# Patient Record
Sex: FEMALE | Race: WHITE | Employment: PART TIME | URBAN - NONMETROPOLITAN AREA
[De-identification: names, ages, dates, MRNs, and addresses within clinical notes are randomized per-mention and may not be internally consistent; named-entity substitution may affect disease eponyms.]

---

## 2017-07-06 ENCOUNTER — TELEPHONE (OUTPATIENT)
Dept: FAMILY MEDICINE CLINIC | Facility: CLINIC | Age: 57
End: 2017-07-06

## 2017-07-06 ENCOUNTER — OFFICE VISIT (OUTPATIENT)
Dept: FAMILY MEDICINE CLINIC | Facility: CLINIC | Age: 57
End: 2017-07-06

## 2017-07-06 VITALS
WEIGHT: 187 LBS | HEART RATE: 88 BPM | RESPIRATION RATE: 18 BRPM | SYSTOLIC BLOOD PRESSURE: 102 MMHG | BODY MASS INDEX: 32.72 KG/M2 | TEMPERATURE: 98 F | HEIGHT: 63.19 IN | DIASTOLIC BLOOD PRESSURE: 78 MMHG

## 2017-07-06 DIAGNOSIS — F33.1 MODERATE RECURRENT MAJOR DEPRESSION (HCC): ICD-10-CM

## 2017-07-06 DIAGNOSIS — Z11.59 NEED FOR HEPATITIS C SCREENING TEST: ICD-10-CM

## 2017-07-06 DIAGNOSIS — Z13.6 SCREENING FOR CARDIOVASCULAR CONDITION: ICD-10-CM

## 2017-07-06 DIAGNOSIS — F51.04 PSYCHOPHYSIOLOGICAL INSOMNIA: ICD-10-CM

## 2017-07-06 DIAGNOSIS — Z12.31 VISIT FOR SCREENING MAMMOGRAM: ICD-10-CM

## 2017-07-06 DIAGNOSIS — C21.0 ANAL CANCER (HCC): ICD-10-CM

## 2017-07-06 DIAGNOSIS — Z00.00 GENERAL MEDICAL EXAM: ICD-10-CM

## 2017-07-06 DIAGNOSIS — Z00.00 ENCOUNTER FOR ANNUAL HEALTH EXAMINATION: Primary | ICD-10-CM

## 2017-07-06 DIAGNOSIS — Z78.0 POSTMENOPAUSAL: ICD-10-CM

## 2017-07-06 DIAGNOSIS — F31.32 BIPOLAR AFFECTIVE DISORDER, CURRENTLY DEPRESSED, MODERATE (HCC): ICD-10-CM

## 2017-07-06 PROCEDURE — 96160 PT-FOCUSED HLTH RISK ASSMT: CPT | Performed by: FAMILY MEDICINE

## 2017-07-06 RX ORDER — TRAMADOL HYDROCHLORIDE 50 MG/1
50 TABLET ORAL EVERY 8 HOURS PRN
COMMUNITY
Start: 2016-10-18 | End: 2019-05-20

## 2017-07-06 RX ORDER — ESCITALOPRAM OXALATE 20 MG/1
20 TABLET ORAL DAILY
COMMUNITY
End: 2017-07-06

## 2017-07-06 RX ORDER — TRAZODONE HYDROCHLORIDE 50 MG/1
50 TABLET ORAL NIGHTLY
COMMUNITY
Start: 2017-03-06 | End: 2017-09-25

## 2017-07-06 RX ORDER — CARBIDOPA/LEVODOPA 25MG-250MG
1 TABLET ORAL 2 TIMES DAILY
COMMUNITY
End: 2017-09-25

## 2017-07-06 RX ORDER — MELATONIN
3 NIGHTLY
COMMUNITY
End: 2017-09-29

## 2017-07-06 RX ORDER — LORATADINE 10 MG/1
10 TABLET ORAL DAILY
COMMUNITY
End: 2017-09-29

## 2017-07-06 RX ORDER — ESTRADIOL 0.1 MG/G
0.01 CREAM VAGINAL
COMMUNITY
Start: 2016-10-17 | End: 2017-08-09

## 2017-07-06 RX ORDER — BUPROPION HYDROCHLORIDE 150 MG/1
150 TABLET, EXTENDED RELEASE ORAL DAILY
COMMUNITY
Start: 2017-03-06 | End: 2017-08-11

## 2017-07-06 NOTE — TELEPHONE ENCOUNTER
Patient needs to follow through with Dr. Padmini Monet and [facility]. V/o Dr. Janis Jones.

## 2017-07-06 NOTE — TELEPHONE ENCOUNTER
Patient in lobby. Has power port. Gets flushed every 3 months. End of flush is with Heparin. Wants to know if this can be done here. Advised need physician's order. Patient verbalizes understanding.

## 2017-07-06 NOTE — PATIENT INSTRUCTIONS
Monica Holguin's SCREENING SCHEDULE   Tests on this list are recommended by your physician but may not be covered, or covered at this frequency, by your insurer. Please check with your insurance carrier before scheduling to verify coverage.    PREVENTATI results found for this or any previous visit. No flowsheet data found. Fecal Occult Blood   Covered Annually No results found for: FOB, OCCULTSTOOL No flowsheet data found.      Barium Enema-   uncomfortable but covered  Covered but uncomfortable   Glau Medium/high risk factors:   End-stage renal disease   Hemophiliacs who received Factor VIII or IX concentrates   Clients of institutions for the mentally retarded   Persons who live in the same house as a HepB virus carrier   Homosexual men   Illicit injec

## 2017-07-06 NOTE — PROGRESS NOTES
HPI:   Devaughn Bruno is a 62year old female who presents for a MA (Medicare Advantage) Supervisit (Once per calendar year). Has complicated anal cancer history.      Her last annual assessment has been over 1 year: Annual Physical due on 01/06/1962 exertion  CARDIOVASCULAR: denies chest pain on exertion  GI: denies abdominal pain, denies heartburn  : denies dysuria, vaginal discharge or itching, no complaint of urinary incontinence   MUSCULOSKELETAL: denies back pain  NEURO: denies headaches  PSYCH COMP METABOLIC PANEL (14); Future  -     TSH W REFLEX TO FREE T4; Future  -     CBC WITH DIFFERENTIAL WITH PLATELET;  Future  -     OP REFERRAL TO GENERAL SURGERY    General medical exam    Anal cancer ([facility] [address]  -     OP REFERRAL TO [address] [place], [place] [address] HEMATOLOGY/ Yes (new patient)     Functional Ability     Bathing or Showering: Need some help    Toileting: Able without help    Dressing: Need some help    Eating: Able without help    Driving: Cannot do without help    Preparing your meals: Able without help    [facility] have been moving around a lot more than usual: Nearly every day  Thoughts that you would be better off dead, or of hurting yourself in some way: More than half the days  PHQ-9 TOTAL SCORE: 21  If you checked off any problems, how difficult have these probl No results found for this or any previous visit. No flowsheet data found.     Pap and Pelvic      Pap: Every 3 yrs age 21-65 or Pap+HPV every 5 yrs age 33-67, age 72 and older at high risk Pap Smear,3 Years due on 01/06/1991 Update Ezoic if boyd data found. Drug Serum Conc  Annually No results found for: DIGOXIN, DIG, VALP No flowsheet data found. Diabetes      HgbA1C  Annually No results found for: A1C No flowsheet data found.     Creat/alb ratio  Annually      LDL  Annually No results foun

## 2017-07-07 ENCOUNTER — HOSPITAL ENCOUNTER (OUTPATIENT)
Dept: MAMMOGRAPHY | Age: 57
Discharge: HOME | End: 2017-07-07
Attending: FAMILY MEDICINE
Payer: MEDICARE

## 2017-07-07 DIAGNOSIS — Z00.00 ENCOUNTER FOR ANNUAL HEALTH EXAMINATION: ICD-10-CM

## 2017-07-07 DIAGNOSIS — Z12.31 VISIT FOR SCREENING MAMMOGRAM: ICD-10-CM

## 2017-07-07 PROCEDURE — 77067 SCR MAMMO BI INCL CAD: CPT | Performed by: FAMILY MEDICINE

## 2017-07-07 NOTE — PROGRESS NOTES
Moderate recurrent major depression ([facility] [address]  Stable, but due to polypharmacy she is referred to [facility] coordinator Magdalena Neumann for connection with psychiatrist for further management.    - OP REFERRAL TO [facility]     Bipolar affective disorder, currently depr

## 2017-07-21 ENCOUNTER — HOSPITAL ENCOUNTER (OUTPATIENT)
Dept: BONE DENSITY | Age: 57
Discharge: HOME | End: 2017-07-21
Attending: FAMILY MEDICINE
Payer: MEDICARE

## 2017-07-21 DIAGNOSIS — Z78.0 POSTMENOPAUSAL: ICD-10-CM

## 2017-07-21 DIAGNOSIS — Z00.00 ENCOUNTER FOR ANNUAL HEALTH EXAMINATION: ICD-10-CM

## 2017-07-21 PROCEDURE — 77080 DXA BONE DENSITY AXIAL: CPT | Performed by: FAMILY MEDICINE

## 2017-08-03 ENCOUNTER — APPOINTMENT (OUTPATIENT)
Dept: HEMATOLOGY/ONCOLOGY | Age: 57
End: 2017-08-03
Attending: INTERNAL MEDICINE
Payer: MEDICARE

## 2017-08-08 ENCOUNTER — TELEPHONE (OUTPATIENT)
Dept: FAMILY MEDICINE CLINIC | Facility: CLINIC | Age: 57
End: 2017-08-08

## 2017-08-08 ENCOUNTER — TELEPHONE (OUTPATIENT)
Dept: SURGERY | Facility: CLINIC | Age: 57
End: 2017-08-08

## 2017-08-08 ENCOUNTER — HOSPITAL ENCOUNTER (OUTPATIENT)
Dept: INTERVENTIONAL RADIOLOGY/VASCULAR | Facility: HOSPITAL | Age: 57
Discharge: HOME | End: 2017-08-08
Attending: SURGERY | Admitting: SURGERY
Payer: MEDICARE

## 2017-08-08 ENCOUNTER — OFFICE VISIT (OUTPATIENT)
Dept: FAMILY MEDICINE CLINIC | Facility: CLINIC | Age: 57
End: 2017-08-08

## 2017-08-08 VITALS
RESPIRATION RATE: 16 BRPM | HEART RATE: 74 BPM | SYSTOLIC BLOOD PRESSURE: 166 MMHG | OXYGEN SATURATION: 99 % | DIASTOLIC BLOOD PRESSURE: 94 MMHG

## 2017-08-08 VITALS
SYSTOLIC BLOOD PRESSURE: 130 MMHG | HEIGHT: 63 IN | DIASTOLIC BLOOD PRESSURE: 82 MMHG | WEIGHT: 188.25 LBS | OXYGEN SATURATION: 81 % | TEMPERATURE: 98 F | BODY MASS INDEX: 33.36 KG/M2 | HEART RATE: 99 BPM

## 2017-08-08 DIAGNOSIS — IMO0001 OTHER COMPLICATION DUE TO VENOUS ACCESS DEVICE, INITIAL ENCOUNTER: ICD-10-CM

## 2017-08-08 DIAGNOSIS — C21.0 ANAL CANCER (HCC): Primary | ICD-10-CM

## 2017-08-08 DIAGNOSIS — IMO0001 OTHER COMPLICATION DUE TO VENOUS ACCESS DEVICE, INITIAL ENCOUNTER: Primary | ICD-10-CM

## 2017-08-08 DIAGNOSIS — Z13.6 SCREENING FOR CARDIOVASCULAR CONDITION: ICD-10-CM

## 2017-08-08 DIAGNOSIS — Z00.00 ENCOUNTER FOR ANNUAL HEALTH EXAMINATION: ICD-10-CM

## 2017-08-08 DIAGNOSIS — Z11.59 NEED FOR HEPATITIS C SCREENING TEST: ICD-10-CM

## 2017-08-08 DIAGNOSIS — C21.0 SQUAMOUS CELL CANCER, ANUS (HCC): Primary | ICD-10-CM

## 2017-08-08 LAB
ALBUMIN SERPL-MCNC: 4.3 G/DL (ref 3.5–4.8)
ALP LIVER SERPL-CCNC: 76 U/L (ref 46–118)
ALT SERPL-CCNC: 30 U/L (ref 14–54)
AST SERPL-CCNC: 17 U/L (ref 15–41)
BASOPHILS # BLD AUTO: 0.02 X10(3) UL (ref 0–0.1)
BASOPHILS NFR BLD AUTO: 0.5 %
BILIRUB SERPL-MCNC: 0.6 MG/DL (ref 0.1–2)
BUN BLD-MCNC: 10 MG/DL (ref 8–20)
CALCIUM BLD-MCNC: 10.2 MG/DL (ref 8.3–10.3)
CHLORIDE: 106 MMOL/L (ref 101–111)
CHOLEST SMN-MCNC: 276 MG/DL (ref ?–200)
CO2: 28 MMOL/L (ref 22–32)
CREAT BLD-MCNC: 0.82 MG/DL (ref 0.55–1.02)
EOSINOPHIL # BLD AUTO: 0.09 X10(3) UL (ref 0–0.3)
EOSINOPHIL NFR BLD AUTO: 2.4 %
ERYTHROCYTE [DISTWIDTH] IN BLOOD BY AUTOMATED COUNT: 13.1 % (ref 11.5–16)
FREE T4: 0.8 NG/DL (ref 0.9–1.8)
GLUCOSE BLD-MCNC: 83 MG/DL (ref 70–99)
HCT VFR BLD AUTO: 39.6 % (ref 34–50)
HDLC SERPL-MCNC: 46 MG/DL (ref 45–?)
HDLC SERPL: 6 {RATIO} (ref ?–4.44)
HEPATITIS C VIRUS AB INTERPRETATION: NONREACTIVE
HGB BLD-MCNC: 13.3 G/DL (ref 12–16)
IMMATURE GRANULOCYTE COUNT: 0.01 X10(3) UL (ref 0–1)
IMMATURE GRANULOCYTE RATIO %: 0.3 %
LDLC SERPL CALC-MCNC: 171 MG/DL (ref ?–130)
LDLC SERPL-MCNC: 59 MG/DL (ref 5–40)
LYMPHOCYTES # BLD AUTO: 1.08 X10(3) UL (ref 0.9–4)
LYMPHOCYTES NFR BLD AUTO: 28.3 %
M PROTEIN MFR SERPL ELPH: 7.8 G/DL (ref 6.1–8.3)
MCH RBC QN AUTO: 29.4 PG (ref 27–33.2)
MCHC RBC AUTO-ENTMCNC: 33.6 G/DL (ref 31–37)
MCV RBC AUTO: 87.4 FL (ref 81–100)
MONOCYTES # BLD AUTO: 0.3 X10(3) UL (ref 0.1–0.6)
MONOCYTES NFR BLD AUTO: 7.9 %
NEUTROPHIL ABS PRELIM: 2.31 X10 (3) UL (ref 1.3–6.7)
NEUTROPHILS # BLD AUTO: 2.31 X10(3) UL (ref 1.3–6.7)
NEUTROPHILS NFR BLD AUTO: 60.6 %
NONHDLC SERPL-MCNC: 230 MG/DL (ref ?–130)
PLATELET # BLD AUTO: 222 10(3)UL (ref 150–450)
POTASSIUM SERPL-SCNC: 3.6 MMOL/L (ref 3.6–5.1)
RBC # BLD AUTO: 4.53 X10(6)UL (ref 3.8–5.1)
RED CELL DISTRIBUTION WIDTH-SD: 41.1 FL (ref 35.1–46.3)
SODIUM SERPL-SCNC: 142 MMOL/L (ref 136–144)
TRIGLYCERIDES: 294 MG/DL (ref ?–150)
TSI SER-ACNC: 7.88 MIU/ML (ref 0.35–5.5)
WBC # BLD AUTO: 3.8 X10(3) UL (ref 4–13)

## 2017-08-08 PROCEDURE — 80061 LIPID PANEL: CPT | Performed by: FAMILY MEDICINE

## 2017-08-08 PROCEDURE — 85025 COMPLETE CBC W/AUTO DIFF WBC: CPT | Performed by: FAMILY MEDICINE

## 2017-08-08 PROCEDURE — 84439 ASSAY OF FREE THYROXINE: CPT | Performed by: FAMILY MEDICINE

## 2017-08-08 PROCEDURE — 36598 INJ W/FLUOR EVAL CV DEVICE: CPT

## 2017-08-08 PROCEDURE — 80053 COMPREHEN METABOLIC PANEL: CPT | Performed by: FAMILY MEDICINE

## 2017-08-08 PROCEDURE — 84443 ASSAY THYROID STIM HORMONE: CPT | Performed by: FAMILY MEDICINE

## 2017-08-08 PROCEDURE — 86803 HEPATITIS C AB TEST: CPT | Performed by: FAMILY MEDICINE

## 2017-08-08 PROCEDURE — 99214 OFFICE O/P EST MOD 30 MIN: CPT | Performed by: SURGERY

## 2017-08-08 NOTE — PROGRESS NOTES
Sheri Conley is a 62year old female  Patient presents with:  Consult: pcp:  Dr. Charlie Uribe for colonoscopy--did have colonoscopy 2016 at [facility], into vaginal wall in 2013. ... [facility] room8      REFERRED BY    Patient presents for colonosc ROS     GENERAL HEALTH: otherwise feels well, no weight loss, no fever or chills  SKIN: denies any unusual skin rashes or jaundice  HEENT: denies nasal congestion, sinus pain or sore throat; hearing loss negative,   EYES , no diplopia or vision examination under anesthesia at this time patient will be due for next colonoscopy in November 2017 we will proceed with that examination at that time. Patient is also going to be following up with Dr. Angelica Villanueva as her oncology physician.   We will also aw

## 2017-08-08 NOTE — PROGRESS NOTES
8/8/17 1130 Patient prepped with Chlorhexidine. Power port accessed with nelson needle in sterile fashion. No blood returned. Attempted to flush with sterile saline. Met resistance. Procedure aborted. Patient tolerated procedure well. V/o Dr. Fidencio Zabala.

## 2017-08-08 NOTE — PROCEDURES
[facility] [address] Patient Status:  Outpatient in a Bed    1960 MRN [id number]   Location [address] [facility] Attending Serjio Belcher DO   Hosp Day # 0 PCP Eloisa Figueredo DO         Brief Procedure Report    Pre-Ope

## 2017-08-08 NOTE — PROGRESS NOTES
After first attempt failed; second attempt made to access implanted port, left upper chest, with stabilizing port, accessed with 20 g Quiroz needle, audible popping sound heard, felt needle touch firmness.   Attempted blood return with no return noted, but w

## 2017-08-08 NOTE — PROCEDURES
[facility]  Pre-Procedure Note    Name: Wilson Nicole  MRN#: [id number]  : 1960    Procedure:  Portcheck    Indication: Unable to draw blood at office.   Also heard a pooping noise    Allergies:      Adhesive Tape           Rash  Chlorhexidine

## 2017-08-08 NOTE — TELEPHONE ENCOUNTER
CHECKED WITH DR Jacki Koyanagi AND HE OK TO ORDER DYE STUDY. CPT CODE 21082. PATIENT CONTACTED AND DIRECTED TO [address] [facility] RADIOLOGY DEPT TO CHECK PATENCY OF IMPLANTED VENOUS ACCESS CATHETER.   INFORMED SHE WILL ALSO NEED STAT LAB FOR

## 2017-08-08 NOTE — TELEPHONE ENCOUNTER
Progress Notes   Evert Omalley, RN (Registered Nurse)   Vashti Peoples      After first attempt failed; second attempt made to access implanted port, left upper chest, with stabilizing port, accessed with 20 g Quiroz needle, audible popping sound heard, felt ne

## 2017-08-09 ENCOUNTER — OFFICE VISIT (OUTPATIENT)
Dept: OBGYN CLINIC | Facility: CLINIC | Age: 57
End: 2017-08-09

## 2017-08-09 ENCOUNTER — TELEPHONE (OUTPATIENT)
Dept: FAMILY MEDICINE CLINIC | Facility: CLINIC | Age: 57
End: 2017-08-09

## 2017-08-09 VITALS
DIASTOLIC BLOOD PRESSURE: 80 MMHG | BODY MASS INDEX: 32.55 KG/M2 | SYSTOLIC BLOOD PRESSURE: 112 MMHG | HEART RATE: 77 BPM | HEIGHT: 63.5 IN | WEIGHT: 186 LBS

## 2017-08-09 DIAGNOSIS — Z85.048 HISTORY OF ANAL CANCER: ICD-10-CM

## 2017-08-09 DIAGNOSIS — Z12.4 CERVICAL CANCER SCREENING: ICD-10-CM

## 2017-08-09 DIAGNOSIS — N95.2 POST-MENOPAUSAL ATROPHIC VAGINITIS: ICD-10-CM

## 2017-08-09 DIAGNOSIS — C21.0 ANAL CANCER (HCC): Primary | ICD-10-CM

## 2017-08-09 DIAGNOSIS — Z01.419 WELL FEMALE EXAM WITH ROUTINE GYNECOLOGICAL EXAM: Primary | ICD-10-CM

## 2017-08-09 PROCEDURE — 87624 HPV HI-RISK TYP POOLED RSLT: CPT | Performed by: OBSTETRICS & GYNECOLOGY

## 2017-08-09 PROCEDURE — 88175 CYTOPATH C/V AUTO FLUID REDO: CPT | Performed by: OBSTETRICS & GYNECOLOGY

## 2017-08-09 PROCEDURE — G0101 CA SCREEN;PELVIC/BREAST EXAM: HCPCS | Performed by: OBSTETRICS & GYNECOLOGY

## 2017-08-09 RX ORDER — ESTRADIOL 0.1 MG/G
1 CREAM VAGINAL
Qty: 1 TUBE | Refills: 3 | Status: SHIPPED | OUTPATIENT
Start: 2017-08-10 | End: 2021-03-02

## 2017-08-09 NOTE — PROGRESS NOTES
GYN H&P     2017  2:58 PM    CC: Patient is here for annual    HPI: patient is a 62year old  here for her annual gyn exam.   She has no complaints. Menopausal. Denies any pelvic pain or irregular vaginal discharge.  Has vaginal stenosis secondar Tab Take 3 mg by mouth nightly. Disp:  Rfl:      No current facility-administered medications on file prior to visit.    Family History   Problem Relation Age of Onset   • Cancer Father      lung       Social History  Social History   Marital status: [place] no suspicious lesions  HEENT: normal  NECK: supple; no thyroidmegaly, no adenopathy  LUNGS: clear to auscultation  CARDIOVASCULAR: normal S1, S2, RRR  BREASTS:  nontendder, no palpable masses or nodes, no nipple discharge, no skin changes, no axillary carter Place 1 g vaginally twice a week.   Dispense: 1 Tube; Refill: 3      Howard Booker MD

## 2017-08-09 NOTE — TELEPHONE ENCOUNTER
Will await Dr Maria Eugenia Umanzor to finish ov note to place referral as additional info required.  charlie

## 2017-08-11 LAB — HPV I/H RISK 1 DNA SPEC QL NAA+PROBE: NEGATIVE

## 2017-08-14 ENCOUNTER — OFFICE VISIT (OUTPATIENT)
Dept: FAMILY MEDICINE CLINIC | Facility: CLINIC | Age: 57
End: 2017-08-14

## 2017-08-14 VITALS
BODY MASS INDEX: 33.13 KG/M2 | TEMPERATURE: 98 F | HEART RATE: 74 BPM | DIASTOLIC BLOOD PRESSURE: 68 MMHG | SYSTOLIC BLOOD PRESSURE: 110 MMHG | HEIGHT: 63 IN | WEIGHT: 187 LBS

## 2017-08-14 DIAGNOSIS — E78.2 MIXED HYPERLIPIDEMIA: ICD-10-CM

## 2017-08-14 DIAGNOSIS — M85.89 OSTEOPENIA OF MULTIPLE SITES: Primary | ICD-10-CM

## 2017-08-14 DIAGNOSIS — R79.89 ABNORMAL THYROID BLOOD TEST: ICD-10-CM

## 2017-08-14 PROBLEM — C21.0 ANAL CARCINOMA (HCC): Status: ACTIVE | Noted: 2017-04-19

## 2017-08-14 PROCEDURE — 99214 OFFICE O/P EST MOD 30 MIN: CPT | Performed by: FAMILY MEDICINE

## 2017-08-14 NOTE — PROGRESS NOTES
CC: abnormal labs/studies  HPI:   Osteopenia:   mulitple sites noted; moderate, recently found on DEXA scan, has had chemotherapy in past, no associated symptoms. Thyroid: tsh elevated. Never in the past.     Hyperlipidemia: mixed patter.  AHA/ACC risk by mouth nightly. Disp:  Rfl:    melatonin 3 MG Oral Tab Take 3 mg by mouth nightly.  Disp:  Rfl:      EXAM: /68 (BP Location: Left arm, Patient Position: Sitting, Cuff Size: large)   Pulse 74   Temp 98 °F (36.7 °C) (Temporal)   Ht 63\"   Wt 187 lb

## 2017-08-16 ENCOUNTER — OFFICE VISIT (OUTPATIENT)
Dept: HEMATOLOGY/ONCOLOGY | Age: 57
End: 2017-08-16
Attending: FAMILY MEDICINE
Payer: MEDICARE

## 2017-08-16 VITALS
HEIGHT: 63 IN | OXYGEN SATURATION: 98 % | TEMPERATURE: 97 F | SYSTOLIC BLOOD PRESSURE: 133 MMHG | BODY MASS INDEX: 33.08 KG/M2 | HEART RATE: 88 BPM | RESPIRATION RATE: 18 BRPM | WEIGHT: 186.69 LBS | DIASTOLIC BLOOD PRESSURE: 90 MMHG

## 2017-08-16 DIAGNOSIS — C21.1 CANCER OF ANAL CANAL (HCC): Primary | ICD-10-CM

## 2017-08-16 PROCEDURE — 99205 OFFICE O/P NEW HI 60 MIN: CPT | Performed by: INTERNAL MEDICINE

## 2017-08-16 NOTE — CONSULTS
Cancer Center Report of Consultation    Patient Name: Candie Escalera   YOB: 1960   Medical Record Number: [id number]   CSN: [id number]   Consulting Physician: Arielle Carlson M.D.    Referring Physician: Nury Salinas    Date of Consultati She states that she went through menopause with the ChemoRT and has had no unusual vaginal bleeding. She saw gynecology for an exam on 8/9/17.         Past Medical History:  Past Medical History:   Diagnosis Date   • Anal cancer ([facility] [address] 2013   • Anesthe tablet (150 mg total) by mouth daily. , Disp: 90 tablet, Rfl: 0  •  Estradiol (ESTRACE) 0.1 MG/GM Vaginal Cream, Place 1 g vaginally twice a week., Disp: 1 Tube, Rfl: 3  •  carbidopa-levodopa  MG Oral Tab, Take 1 tablet by mouth daily. , Disp: , Rfl: Normal gait. Psychiatric Normal - No insomnia, depression, phyllis or mood swings.          Vital Signs:  /90 (BP Location: Left arm, Patient Position: Sitting, Cuff Size: adult)   Pulse 88   Temp (!) 97.3 °F (36.3 °C) (Tympanic)   Resp 18   Ht 1.6 m mmol/L 142   Potassium Latest Ref Range: 3.6 - 5.1 mmol/L 3.6   Chloride Latest Ref Range: 101 - 111 mmol/L 106   Carbon Dioxide, Total Latest Ref Range: 22.0 - 32.0 mmol/L 28.0   BUN Latest Ref Range: 8 - 20 mg/dL 10   CREATININE Latest Ref Range: 0.55 - 0.60 x10(3) uL 0.30   Eosinophils Absolute Latest Ref Range: 0.00 - 0.30 x10(3) uL 0.09   Basophils Absolute Latest Ref Range: 0.00 - 0.10 x10(3) uL 0.02   Immature Granulocyte Absolute Latest Ref Range: 0.00 - 1.00 x10(3) uL 0.01   Neutrophils % Latest Un

## 2017-08-16 NOTE — PROGRESS NOTES
Faxed signed release of info form to:  1. Dr. Mamie Stephens at fax [phone] for all chemotherapy/lab records from 2013-present  2. [facility] at fax [phone] for all procedure/operative reports and pathology reports from 2013-present  3.

## 2017-08-16 NOTE — PROGRESS NOTES
Pt here for consult for continuation of care for hx of anal cancer. Pt dx in 8/2013, had XRT/chemo from 12/2013-2/2014. Pt has been on surveillance since with every 6 months with yearly scans. Pt's energy level is chronically low, has fibromyalgia.  Appetit

## 2017-08-28 ENCOUNTER — MED REC SCAN ONLY (OUTPATIENT)
Dept: FAMILY MEDICINE CLINIC | Facility: CLINIC | Age: 57
End: 2017-08-28

## 2017-08-28 ENCOUNTER — HOSPITAL ENCOUNTER (OUTPATIENT)
Facility: HOSPITAL | Age: 57
Setting detail: HOSPITAL OUTPATIENT SURGERY
Discharge: HOME | End: 2017-08-28
Attending: SURGERY | Admitting: SURGERY
Payer: MEDICARE

## 2017-08-28 ENCOUNTER — SURGERY (OUTPATIENT)
Age: 57
End: 2017-08-28

## 2017-08-28 VITALS
TEMPERATURE: 98 F | RESPIRATION RATE: 16 BRPM | SYSTOLIC BLOOD PRESSURE: 110 MMHG | HEIGHT: 63 IN | WEIGHT: 191 LBS | DIASTOLIC BLOOD PRESSURE: 78 MMHG | BODY MASS INDEX: 33.84 KG/M2 | OXYGEN SATURATION: 99 % | HEART RATE: 66 BPM

## 2017-08-28 DIAGNOSIS — C21.0 SQUAMOUS CELL CANCER, ANUS (HCC): ICD-10-CM

## 2017-08-28 PROCEDURE — 88305 TISSUE EXAM BY PATHOLOGIST: CPT | Performed by: SURGERY

## 2017-08-28 PROCEDURE — 0DBQ7ZX EXCISION OF ANUS, VIA NATURAL OR ARTIFICIAL OPENING, DIAGNOSTIC: ICD-10-PCS | Performed by: SURGERY

## 2017-08-28 RX ORDER — IBUPROFEN 200 MG
200 TABLET ORAL EVERY 6 HOURS PRN
COMMUNITY
End: 2017-09-29

## 2017-08-28 RX ORDER — DIPHENHYDRAMINE HYDROCHLORIDE 50 MG/ML
12.5 INJECTION INTRAMUSCULAR; INTRAVENOUS AS NEEDED
Status: DISCONTINUED | OUTPATIENT
Start: 2017-08-28 | End: 2017-08-28

## 2017-08-28 RX ORDER — MEPERIDINE HYDROCHLORIDE 25 MG/ML
12.5 INJECTION INTRAMUSCULAR; INTRAVENOUS; SUBCUTANEOUS AS NEEDED
Status: DISCONTINUED | OUTPATIENT
Start: 2017-08-28 | End: 2017-08-28

## 2017-08-28 RX ORDER — MIDAZOLAM HYDROCHLORIDE 1 MG/ML
1 INJECTION INTRAMUSCULAR; INTRAVENOUS EVERY 5 MIN PRN
Status: DISCONTINUED | OUTPATIENT
Start: 2017-08-28 | End: 2017-08-28

## 2017-08-28 RX ORDER — ONDANSETRON 2 MG/ML
4 INJECTION INTRAMUSCULAR; INTRAVENOUS AS NEEDED
Status: DISCONTINUED | OUTPATIENT
Start: 2017-08-28 | End: 2017-08-28

## 2017-08-28 RX ORDER — HEPARIN SODIUM 5000 [USP'U]/ML
5000 INJECTION, SOLUTION INTRAVENOUS; SUBCUTANEOUS ONCE
Status: COMPLETED | OUTPATIENT
Start: 2017-08-28 | End: 2017-08-28

## 2017-08-28 RX ORDER — SODIUM CHLORIDE, SODIUM LACTATE, POTASSIUM CHLORIDE, CALCIUM CHLORIDE 600; 310; 30; 20 MG/100ML; MG/100ML; MG/100ML; MG/100ML
INJECTION, SOLUTION INTRAVENOUS CONTINUOUS
Status: DISCONTINUED | OUTPATIENT
Start: 2017-08-28 | End: 2017-08-28

## 2017-08-28 RX ORDER — DEXAMETHASONE SODIUM PHOSPHATE 4 MG/ML
4 VIAL (ML) INJECTION AS NEEDED
Status: DISCONTINUED | OUTPATIENT
Start: 2017-08-28 | End: 2017-08-28

## 2017-08-28 RX ORDER — HYDROMORPHONE HYDROCHLORIDE 1 MG/ML
0.4 INJECTION, SOLUTION INTRAMUSCULAR; INTRAVENOUS; SUBCUTANEOUS EVERY 5 MIN PRN
Status: DISCONTINUED | OUTPATIENT
Start: 2017-08-28 | End: 2017-08-28

## 2017-08-28 RX ORDER — HYDROCODONE BITARTRATE AND ACETAMINOPHEN 5; 325 MG/1; MG/1
1 TABLET ORAL AS NEEDED
Status: DISCONTINUED | OUTPATIENT
Start: 2017-08-28 | End: 2017-08-28

## 2017-08-28 RX ORDER — LABETALOL HYDROCHLORIDE 5 MG/ML
5 INJECTION, SOLUTION INTRAVENOUS EVERY 5 MIN PRN
Status: DISCONTINUED | OUTPATIENT
Start: 2017-08-28 | End: 2017-08-28

## 2017-08-28 RX ORDER — ACETAMINOPHEN 325 MG/1
325 TABLET ORAL EVERY 6 HOURS PRN
COMMUNITY
End: 2017-09-29

## 2017-08-28 RX ORDER — ACETAMINOPHEN 500 MG
1000 TABLET ORAL ONCE AS NEEDED
Status: DISCONTINUED | OUTPATIENT
Start: 2017-08-28 | End: 2017-08-28

## 2017-08-28 RX ORDER — NALOXONE HYDROCHLORIDE 0.4 MG/ML
80 INJECTION, SOLUTION INTRAMUSCULAR; INTRAVENOUS; SUBCUTANEOUS AS NEEDED
Status: DISCONTINUED | OUTPATIENT
Start: 2017-08-28 | End: 2017-08-28

## 2017-08-28 RX ORDER — HYDROCODONE BITARTRATE AND ACETAMINOPHEN 5; 325 MG/1; MG/1
2 TABLET ORAL AS NEEDED
Status: DISCONTINUED | OUTPATIENT
Start: 2017-08-28 | End: 2017-08-28

## 2017-08-28 NOTE — BRIEF OP NOTE
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

## 2017-08-28 NOTE — OPERATIVE REPORT
[facility]    PATIENT'S NAME: Karyna Chantal   ATTENDING PHYSICIAN: Alejandra Zheng D.O.   OPERATING PHYSICIAN: Alejandra Zheng D.O.   PATIENT ACCOUNT#:   [de-identified]    LOCATION:  [address] [place] [address] [address]  MEDICAL RECORD #:   [id number]       JAMES

## 2017-08-28 NOTE — H&P
Jonelle Holguin is a 62year old female  No chief complaint on file. REFERRED BY    Patient presents with ho of anal squamous cell ca       .            Past Medical History:   Diagnosis Date   • Anal cancer ([facility] [address] 2013   • Anesthesia complication     s jaundice  HEENT: denies nasal congestion, sinus pain or sore throat; hearing loss negative,   EYES , no diplopia or vision changes  RESPIRATORY: denies shortness of breath, wheezing or cough   CARDIOVASCULAR: denies chest pain or COLLAZO; no palpitations   GI: Categorization 08/11/2017 Negative for intraepithelial lesion or malignancy     Final   • Final Diagnosis Comment 08/11/2017    Final                    Value: This result contains rich text formatting which cannot be displayed here.    • HPV High Risk DNA 0 WEIGHT  INTAKE AND OUTPUT  PLACE SEQUENTIAL COMPRESSION DEVICE  INITIATE ADULT PREOP PROPHYLACTIC ABX PROTOCOL  VERIFY INFORMED CONSENT

## 2017-08-30 ENCOUNTER — HOSPITAL ENCOUNTER (OUTPATIENT)
Dept: ULTRASOUND IMAGING | Age: 57
Discharge: HOME | End: 2017-08-30
Attending: FAMILY MEDICINE
Payer: MEDICARE

## 2017-08-30 ENCOUNTER — APPOINTMENT (OUTPATIENT)
Dept: LAB | Age: 57
End: 2017-08-30
Attending: FAMILY MEDICINE
Payer: MEDICARE

## 2017-08-30 DIAGNOSIS — R79.89 ABNORMAL THYROID BLOOD TEST: ICD-10-CM

## 2017-08-30 LAB
ANTI-THYROGLOBULIN: <15 U/ML (ref ?–60)
ANTI-THYROPEROXIDASE: <28 U/ML (ref ?–60)
FREE T4: 0.6 NG/DL (ref 0.9–1.8)
THYROXINE (T4): 6.4 UG/DL (ref 4.5–10.9)
TSI SER-ACNC: 21.4 MIU/ML (ref 0.35–5.5)

## 2017-08-30 PROCEDURE — 76536 US EXAM OF HEAD AND NECK: CPT | Performed by: FAMILY MEDICINE

## 2017-08-30 PROCEDURE — 86800 THYROGLOBULIN ANTIBODY: CPT

## 2017-08-30 PROCEDURE — 84443 ASSAY THYROID STIM HORMONE: CPT

## 2017-08-30 PROCEDURE — 36415 COLL VENOUS BLD VENIPUNCTURE: CPT

## 2017-08-30 PROCEDURE — 84439 ASSAY OF FREE THYROXINE: CPT

## 2017-08-30 PROCEDURE — 86376 MICROSOMAL ANTIBODY EACH: CPT

## 2017-09-05 ENCOUNTER — OFFICE VISIT (OUTPATIENT)
Dept: FAMILY MEDICINE CLINIC | Facility: CLINIC | Age: 57
End: 2017-09-05

## 2017-09-05 VITALS
DIASTOLIC BLOOD PRESSURE: 70 MMHG | OXYGEN SATURATION: 97 % | WEIGHT: 188 LBS | HEART RATE: 88 BPM | SYSTOLIC BLOOD PRESSURE: 124 MMHG | TEMPERATURE: 97 F | BODY MASS INDEX: 33 KG/M2

## 2017-09-05 DIAGNOSIS — E03.2 HYPOTHYROIDISM DUE TO MEDICATION: Primary | ICD-10-CM

## 2017-09-05 PROCEDURE — 99214 OFFICE O/P EST MOD 30 MIN: CPT | Performed by: FAMILY MEDICINE

## 2017-09-05 RX ORDER — LEVOTHYROXINE SODIUM 0.03 MG/1
25 TABLET ORAL
Qty: 30 TABLET | Refills: 2 | Status: SHIPPED | OUTPATIENT
Start: 2017-09-05 | End: 2017-12-01

## 2017-09-05 NOTE — PROGRESS NOTES
CC: abnormal labs    HPI:     Location thyroid  Quality tsh elevated, t4 low  Severity moderate  Duration recently found  Associated symptoms edema    Ultrasound appears to show smaller diseased thyroid gland. Ab tests are negative.      ROS: pos fatigue, n

## 2017-09-06 ENCOUNTER — TELEPHONE (OUTPATIENT)
Dept: FAMILY MEDICINE CLINIC | Facility: CLINIC | Age: 57
End: 2017-09-06

## 2017-09-06 NOTE — TELEPHONE ENCOUNTER
Pt was notified of her bx results. Pt states she is due in 11/2017 for a colonoscopy as her 1 yr f/u for rectal cancer. Pt wants to know if she should schedule this? Pt will be due for another anal exam under anesthesia in 6 mo.  Pt aware will discuss with

## 2017-09-11 ENCOUNTER — TELEPHONE (OUTPATIENT)
Dept: SURGERY | Facility: CLINIC | Age: 57
End: 2017-09-11

## 2017-09-11 ENCOUNTER — TELEPHONE (OUTPATIENT)
Dept: FAMILY MEDICINE CLINIC | Facility: CLINIC | Age: 57
End: 2017-09-11

## 2017-09-11 DIAGNOSIS — C21.0 ANAL CANCER (HCC): Primary | ICD-10-CM

## 2017-09-11 NOTE — TELEPHONE ENCOUNTER
Per Dr Polly Madrigal pt will need her colonoscopy in Nov 2017. He would like me to schedule the pt. charlie    I spoke to the pt and scheduled her for her colonoscopy on 11/10/17 at [address] [facility]. Pt aware I will send her prep to [facility] and mail her out her instructions.

## 2017-09-11 NOTE — TELEPHONE ENCOUNTER
Phone call from patient. Started Synthroid on Friday am.  Last night noticed itching and \"bumps\" on hands and shoulders. Now has \"hives\" on chest, neck, shoulders and hands. Itching. No shortness of breath or throat tightness.     Synthroid is the o

## 2017-09-11 NOTE — TELEPHONE ENCOUNTER
Pt called stated Dr Stephanie Granados referred her to [place] [facility] [place] [address] and she would like someone through [facility]. Also, she started synthroid and she has been itching and has a few hives.

## 2017-09-11 NOTE — TELEPHONE ENCOUNTER
Patient advised. She will hold Synthroid until further notice. States that she is unable to see Dr. Td Lemons until 10/31/17. Do you want her seen sooner? If so, we need to contact Dr. Iliana Saleh office to arrange for an appointment at a sooner date.

## 2017-09-12 ENCOUNTER — OFFICE VISIT (OUTPATIENT)
Dept: FAMILY MEDICINE CLINIC | Facility: CLINIC | Age: 57
End: 2017-09-12

## 2017-09-12 VITALS
SYSTOLIC BLOOD PRESSURE: 138 MMHG | RESPIRATION RATE: 16 BRPM | OXYGEN SATURATION: 96 % | DIASTOLIC BLOOD PRESSURE: 78 MMHG | TEMPERATURE: 98 F | WEIGHT: 185.13 LBS | HEART RATE: 95 BPM | BODY MASS INDEX: 33 KG/M2

## 2017-09-12 DIAGNOSIS — L30.9 DERMATITIS: Primary | ICD-10-CM

## 2017-09-12 PROCEDURE — 99213 OFFICE O/P EST LOW 20 MIN: CPT | Performed by: FAMILY MEDICINE

## 2017-09-12 NOTE — PROGRESS NOTES
CC: rash    HPI     Location arms, trunk, chest  Quality red, raised, papular  Duration 48 hors  Timing seemed to follow start of levothyroxine, which is the only new part of any of her routines  Context was not outside or in woods or attic or basement  As encounter    Imaging & Consults:  None

## 2017-09-25 RX ORDER — TRAZODONE HYDROCHLORIDE 50 MG/1
50 TABLET ORAL NIGHTLY
Qty: 90 TABLET | Refills: 1 | Status: SHIPPED | OUTPATIENT
Start: 2017-09-25 | End: 2018-01-30

## 2017-09-25 RX ORDER — CARBIDOPA/LEVODOPA 25MG-250MG
1 TABLET ORAL 2 TIMES DAILY
Qty: 90 TABLET | Refills: 1 | Status: SHIPPED | OUTPATIENT
Start: 2017-09-25 | End: 2018-01-30

## 2017-09-25 NOTE — TELEPHONE ENCOUNTER
LOV: 9/12/17 for dermatitis  Did not see any refill request from 2 weeks ago?     carbidopa-levodopa  MG Oral Tab       Sig :  Take 1 tablet by mouth 2 (two) times daily.       Route:   Oral       TraZODone HCl 50 MG Oral Tab   3/6/2017    Sig : Keyla Delgado

## 2017-09-25 NOTE — TELEPHONE ENCOUNTER
Pt called stated she sent in 2 refill requests one for Carbidope-levodeopa and Trazadone through My chart about 2 weeks ago and she has not heard anything. Pt stated she absolutely needs the Carbidope or she can't sleep.  Would like sent to [facility] in [place]

## 2017-10-18 ENCOUNTER — HOSPITAL ENCOUNTER (OUTPATIENT)
Dept: CT IMAGING | Age: 57
Discharge: HOME | End: 2017-10-18
Attending: INTERNAL MEDICINE
Payer: MEDICARE

## 2017-10-18 DIAGNOSIS — C21.1 CANCER OF ANAL CANAL (HCC): ICD-10-CM

## 2017-10-18 PROCEDURE — 74177 CT ABD & PELVIS W/CONTRAST: CPT | Performed by: INTERNAL MEDICINE

## 2017-10-18 PROCEDURE — 71260 CT THORAX DX C+: CPT | Performed by: INTERNAL MEDICINE

## 2017-10-25 RX ORDER — SODIUM CHLORIDE, SODIUM LACTATE, POTASSIUM CHLORIDE, CALCIUM CHLORIDE 600; 310; 30; 20 MG/100ML; MG/100ML; MG/100ML; MG/100ML
INJECTION, SOLUTION INTRAVENOUS CONTINUOUS
Status: CANCELLED | OUTPATIENT
Start: 2017-10-25

## 2017-11-01 DIAGNOSIS — Z12.11 ENCOUNTER FOR SCREENING COLONOSCOPY: ICD-10-CM

## 2017-11-01 DIAGNOSIS — C21.0 ANAL CARCINOMA (HCC): Primary | ICD-10-CM

## 2017-11-06 ENCOUNTER — TELEPHONE (OUTPATIENT)
Dept: FAMILY MEDICINE CLINIC | Facility: CLINIC | Age: 57
End: 2017-11-06

## 2017-11-06 NOTE — TELEPHONE ENCOUNTER
Patient had thyroid labs drawn 10/31  Lab value came back abnormal  CMP, TSH, t4 total and free pended. Please advise if you would like any other labs drawn.

## 2017-11-06 NOTE — TELEPHONE ENCOUNTER
Pt called stated she is seeing Dr. Shiatl Remy on Thursday and they are doing lab work and Dr. Tess Puente wants her to have labs done from him so she wants to make sure those labs are added on as well. She can do those in his office.  She wants all the thyroid d

## 2017-11-06 NOTE — TELEPHONE ENCOUNTER
Patient informed she is up to date on labs with Dr. Janis Jones. Dr. Kristin Fontanez is monitoring pt's thyroid. Patient verbalized understanding.

## 2017-11-09 ENCOUNTER — OFFICE VISIT (OUTPATIENT)
Dept: HEMATOLOGY/ONCOLOGY | Age: 57
End: 2017-11-09
Attending: FAMILY MEDICINE
Payer: MEDICARE

## 2017-11-09 ENCOUNTER — NURSE ONLY (OUTPATIENT)
Dept: HEMATOLOGY/ONCOLOGY | Age: 57
End: 2017-11-09
Attending: FAMILY MEDICINE
Payer: MEDICARE

## 2017-11-09 VITALS
BODY MASS INDEX: 32 KG/M2 | SYSTOLIC BLOOD PRESSURE: 129 MMHG | HEART RATE: 77 BPM | WEIGHT: 179.81 LBS | TEMPERATURE: 98 F | RESPIRATION RATE: 18 BRPM | OXYGEN SATURATION: 99 % | DIASTOLIC BLOOD PRESSURE: 87 MMHG

## 2017-11-09 DIAGNOSIS — N28.1 RENAL CYST, RIGHT: ICD-10-CM

## 2017-11-09 DIAGNOSIS — C21.1 CANCER OF ANAL CANAL (HCC): Primary | ICD-10-CM

## 2017-11-09 DIAGNOSIS — C21.0 ANAL CARCINOMA (HCC): Primary | ICD-10-CM

## 2017-11-09 DIAGNOSIS — C21.0 ANAL CARCINOMA (HCC): ICD-10-CM

## 2017-11-09 DIAGNOSIS — R91.8 PULMONARY NODULES: ICD-10-CM

## 2017-11-09 PROCEDURE — 85025 COMPLETE CBC W/AUTO DIFF WBC: CPT

## 2017-11-09 PROCEDURE — 36591 DRAW BLOOD OFF VENOUS DEVICE: CPT

## 2017-11-09 PROCEDURE — 80053 COMPREHEN METABOLIC PANEL: CPT

## 2017-11-09 PROCEDURE — 99213 OFFICE O/P EST LOW 20 MIN: CPT | Performed by: INTERNAL MEDICINE

## 2017-11-09 RX ORDER — SODIUM CHLORIDE 0.9 % (FLUSH) 0.9 %
10 SYRINGE (ML) INJECTION ONCE
Status: COMPLETED | OUTPATIENT
Start: 2017-11-09 | End: 2017-11-09

## 2017-11-09 RX ORDER — SODIUM CHLORIDE 0.9 % (FLUSH) 0.9 %
10 SYRINGE (ML) INJECTION ONCE
Status: CANCELLED | OUTPATIENT
Start: 2017-11-09

## 2017-11-09 RX ADMIN — SODIUM CHLORIDE 0.9 % (FLUSH) 10 ML: 0.9 % SYRINGE (ML) INJECTION at 15:00:00

## 2017-11-09 NOTE — PATIENT INSTRUCTIONS
Contact Information    Monday-Friday 8:30-4:00   Dr. Sebastien Wyatt [phone]    After hours and on Weekends:   Emergency number  [place]     To make an Appointment with Dr. Alverto Bennett [phone]

## 2017-11-10 ENCOUNTER — SURGERY (OUTPATIENT)
Age: 57
End: 2017-11-10

## 2017-11-10 ENCOUNTER — HOSPITAL ENCOUNTER (OUTPATIENT)
Facility: HOSPITAL | Age: 57
Setting detail: HOSPITAL OUTPATIENT SURGERY
Discharge: HOME | End: 2017-11-10
Attending: SURGERY | Admitting: SURGERY
Payer: MEDICARE

## 2017-11-10 VITALS
SYSTOLIC BLOOD PRESSURE: 118 MMHG | OXYGEN SATURATION: 96 % | DIASTOLIC BLOOD PRESSURE: 68 MMHG | RESPIRATION RATE: 16 BRPM | HEIGHT: 63 IN | BODY MASS INDEX: 31.59 KG/M2 | HEART RATE: 67 BPM | WEIGHT: 178.31 LBS | TEMPERATURE: 98 F

## 2017-11-10 DIAGNOSIS — C21.0 ANAL CANCER (HCC): ICD-10-CM

## 2017-11-10 PROCEDURE — 0DBM8ZX EXCISION OF DESCENDING COLON, VIA NATURAL OR ARTIFICIAL OPENING ENDOSCOPIC, DIAGNOSTIC: ICD-10-PCS | Performed by: SURGERY

## 2017-11-10 PROCEDURE — 88305 TISSUE EXAM BY PATHOLOGIST: CPT | Performed by: SURGERY

## 2017-11-10 RX ORDER — NALOXONE HYDROCHLORIDE 0.4 MG/ML
80 INJECTION, SOLUTION INTRAMUSCULAR; INTRAVENOUS; SUBCUTANEOUS AS NEEDED
Status: DISCONTINUED | OUTPATIENT
Start: 2017-11-10 | End: 2017-11-10

## 2017-11-10 RX ORDER — ONDANSETRON 2 MG/ML
4 INJECTION INTRAMUSCULAR; INTRAVENOUS AS NEEDED
Status: DISCONTINUED | OUTPATIENT
Start: 2017-11-10 | End: 2017-11-10

## 2017-11-10 RX ORDER — SODIUM CHLORIDE, SODIUM LACTATE, POTASSIUM CHLORIDE, CALCIUM CHLORIDE 600; 310; 30; 20 MG/100ML; MG/100ML; MG/100ML; MG/100ML
INJECTION, SOLUTION INTRAVENOUS CONTINUOUS
Status: DISCONTINUED | OUTPATIENT
Start: 2017-11-10 | End: 2017-11-10

## 2017-11-10 RX ORDER — METOCLOPRAMIDE HYDROCHLORIDE 5 MG/ML
10 INJECTION INTRAMUSCULAR; INTRAVENOUS AS NEEDED
Status: DISCONTINUED | OUTPATIENT
Start: 2017-11-10 | End: 2017-11-10

## 2017-11-10 NOTE — PROGRESS NOTES
Cancer Center Progress Note  Patient Name: Jessi Cuevas   YOB: 1960   Medical Record Number: [id number]   CSN: [id number]   Attending Physician: Jae Crawford M.D.        Date of Visit: 11/9/2017     Chief Complaint:  Patient presents which she, again, attributes to the fissure. She states that she went through menopause with the ChemoRT and has had no unusual vaginal bleeding.        She saw gynecology for an exam on 8/9/17.         History of Present Illness:  Pt is here for follow up History  None on file     Social History Main Topics   Smoking status: Never Smoker    Smokeless tobacco: Never Used    Alcohol use Yes    Comment: every other weekend couple     Drug use: No    Sexual activity: No     Other Topics Concern    Caffeine Conc loss.   Eyes No significant visual difficulties. No diplopia. No yellowing of the eyes. Hematologic/Lymphatic No easy bruising or bleeding. No any tender or palpable lymph nodes. Respiratory No dyspnea, Pleuritic chest pain, cough or hemoptysis.    Car Laboratory:    Radiology:  CT C/A/PCONCLUSION:  There are 2 new subpleural nodules in the lingula and left lower lobe measuring 3.5 mm and 2.8 mm respectively. Cholelithiasis.      Enlarging complex low attenuation mass arising from the midpole

## 2017-11-10 NOTE — OR NURSING
Attempted power point access x2, ,2 different nurse tried. Patient states the port is \"tipped\". Unable to access. Band aid placed. Patient tolerated well.

## 2017-11-10 NOTE — H&P
Patient presents for colonoscopy   Pt has h.o of anal cancer that went into the anal canal pt had radiation and chemo   Was diagnosed on Oct 13 2013     Pt underwent treatment [facility] Feb 17 2014  Has been seeing dr Liliana Luna and dr Reyna Orf throat; hearing loss negative,   EYES , no diplopia or vision changes  RESPIRATORY: denies shortness of breath, wheezing or cough   CARDIOVASCULAR: denies chest pain or COLLAZO; no palpitations   GI: denies nausea, vomiting, constipation, diarrhea; no rectal b Dr. Donavon Callander as her oncology physician.   We will also await his recommendations

## 2017-11-11 ENCOUNTER — MED REC SCAN ONLY (OUTPATIENT)
Dept: FAMILY MEDICINE CLINIC | Facility: CLINIC | Age: 57
End: 2017-11-11

## 2017-11-11 NOTE — OPERATIVE REPORT
[address]    PATIENT'S NAME: Pankaj Thomson   ATTENDING PHYSICIAN: Darien Kumar D.O.   OPERATING PHYSICIAN: Darien Kumar D.O.   PATIENT ACCOUNT#:   [id number]    LOCATION:  [facility] ENDO [place] ROOMS 1 [address] #:   [id number]       D shireen.     Dictated By Alley Adrian D.O.  d: 11/10/2017 16:17:42  t: 11/11/2017 01:55:55  Job [id number]  /    cc: Curtis Hope D.O.

## 2017-11-14 ENCOUNTER — OFFICE VISIT (OUTPATIENT)
Dept: FAMILY MEDICINE CLINIC | Facility: CLINIC | Age: 57
End: 2017-11-14

## 2017-11-14 ENCOUNTER — HOSPITAL ENCOUNTER (OUTPATIENT)
Dept: ULTRASOUND IMAGING | Age: 57
Discharge: HOME | End: 2017-11-14
Attending: INTERNAL MEDICINE
Payer: MEDICARE

## 2017-11-14 VITALS
BODY MASS INDEX: 32 KG/M2 | WEIGHT: 180.25 LBS | HEART RATE: 73 BPM | OXYGEN SATURATION: 97 % | DIASTOLIC BLOOD PRESSURE: 70 MMHG | RESPIRATION RATE: 16 BRPM | SYSTOLIC BLOOD PRESSURE: 124 MMHG | TEMPERATURE: 98 F

## 2017-11-14 DIAGNOSIS — R91.8 PULMONARY NODULES: ICD-10-CM

## 2017-11-14 DIAGNOSIS — N28.1 RENAL CYST, RIGHT: ICD-10-CM

## 2017-11-14 DIAGNOSIS — E03.2 HYPOTHYROIDISM DUE TO MEDICATION: Primary | ICD-10-CM

## 2017-11-14 DIAGNOSIS — C21.0 ANAL CARCINOMA (HCC): ICD-10-CM

## 2017-11-14 PROCEDURE — 76775 US EXAM ABDO BACK WALL LIM: CPT | Performed by: INTERNAL MEDICINE

## 2017-11-14 PROCEDURE — 99214 OFFICE O/P EST MOD 30 MIN: CPT | Performed by: FAMILY MEDICINE

## 2017-11-14 RX ORDER — LEVOTHYROXINE SODIUM 0.03 MG/1
37.5 TABLET ORAL
Qty: 135 TABLET | Refills: 0 | Status: SHIPPED | OUTPATIENT
Start: 2017-11-14 | End: 2017-12-01

## 2017-11-14 NOTE — PROGRESS NOTES
CC: follow up    HPI:     Thyroid: tsh down to 7.9 (from 21). Has lost some weight through diet. Overall she is feeling better.  Has been to endocrinologist. The u/s showed small in-homogenous gland, consistent with disease, however the ab tests were negati

## 2017-11-15 ENCOUNTER — TELEPHONE (OUTPATIENT)
Dept: HEMATOLOGY/ONCOLOGY | Facility: HOSPITAL | Age: 57
End: 2017-11-15

## 2017-11-15 ENCOUNTER — TELEPHONE (OUTPATIENT)
Dept: FAMILY MEDICINE CLINIC | Facility: CLINIC | Age: 57
End: 2017-11-15

## 2017-11-15 DIAGNOSIS — N28.89 KIDNEY MASS: Primary | ICD-10-CM

## 2017-11-15 NOTE — TELEPHONE ENCOUNTER
Reviewed renal ultrasound with patient which shows R renal mass w/cystic/solid component. Differential dx includes primary vs secondary neoplasm vs cyst.  Referred to urology for further evaluation. Pt has h/o anal canal cancer.

## 2017-11-15 NOTE — TELEPHONE ENCOUNTER
Pt called stated she needs to see a urologist so she needs a referral for Dr. Colten Machado apt is on Dec 4th. He is in [address] [place] [address] this who Dr. Paige Campos wants her to see.

## 2017-11-17 ENCOUNTER — TELEPHONE (OUTPATIENT)
Dept: SURGERY | Facility: CLINIC | Age: 57
End: 2017-11-17

## 2017-11-17 DIAGNOSIS — Z85.048 HISTORY OF ANAL CANCER: Primary | ICD-10-CM

## 2017-11-20 ENCOUNTER — TELEPHONE (OUTPATIENT)
Dept: SURGERY | Facility: CLINIC | Age: 57
End: 2017-11-20

## 2017-11-20 DIAGNOSIS — Z85.048 HISTORY OF RECTAL CANCER: Primary | ICD-10-CM

## 2017-11-26 PROBLEM — E03.9 HYPOTHYROIDISM (ACQUIRED): Status: ACTIVE | Noted: 2017-11-26

## 2018-01-09 ENCOUNTER — NURSE ONLY (OUTPATIENT)
Dept: FAMILY MEDICINE CLINIC | Facility: CLINIC | Age: 58
End: 2018-01-09

## 2018-01-09 DIAGNOSIS — E03.9 HYPOTHYROIDISM (ACQUIRED): Primary | ICD-10-CM

## 2018-01-09 LAB
FREE T4: 0.7 NG/DL (ref 0.9–1.8)
THYROXINE (T4): 6 UG/DL (ref 4.5–10.9)
TSI SER-ACNC: 5.12 MIU/ML (ref 0.35–5.5)

## 2018-01-09 PROCEDURE — 84439 ASSAY OF FREE THYROXINE: CPT | Performed by: FAMILY MEDICINE

## 2018-01-09 PROCEDURE — 84443 ASSAY THYROID STIM HORMONE: CPT | Performed by: FAMILY MEDICINE

## 2018-01-09 PROCEDURE — 36415 COLL VENOUS BLD VENIPUNCTURE: CPT | Performed by: FAMILY MEDICINE

## 2018-01-12 ENCOUNTER — OFFICE VISIT (OUTPATIENT)
Dept: FAMILY MEDICINE CLINIC | Facility: CLINIC | Age: 58
End: 2018-01-12

## 2018-01-12 VITALS
BODY MASS INDEX: 31.54 KG/M2 | SYSTOLIC BLOOD PRESSURE: 112 MMHG | OXYGEN SATURATION: 98 % | RESPIRATION RATE: 18 BRPM | TEMPERATURE: 98 F | DIASTOLIC BLOOD PRESSURE: 74 MMHG | HEIGHT: 63 IN | HEART RATE: 91 BPM | WEIGHT: 178 LBS

## 2018-01-12 DIAGNOSIS — F32.A DEPRESSION, UNSPECIFIED DEPRESSION TYPE: ICD-10-CM

## 2018-01-12 DIAGNOSIS — Z78.0 POSTMENOPAUSAL: ICD-10-CM

## 2018-01-12 DIAGNOSIS — N28.89 RENAL MASS: ICD-10-CM

## 2018-01-12 DIAGNOSIS — Z13.1 SCREENING FOR DIABETES MELLITUS (DM): ICD-10-CM

## 2018-01-12 DIAGNOSIS — Z12.31 VISIT FOR SCREENING MAMMOGRAM: ICD-10-CM

## 2018-01-12 DIAGNOSIS — E03.2 HYPOTHYROIDISM DUE TO MEDICATION: ICD-10-CM

## 2018-01-12 DIAGNOSIS — Z13.6 SCREENING FOR CARDIOVASCULAR CONDITION: ICD-10-CM

## 2018-01-12 DIAGNOSIS — Z00.00 ENCOUNTER FOR ANNUAL HEALTH EXAMINATION: Primary | ICD-10-CM

## 2018-01-12 DIAGNOSIS — C21.0 ANAL CANCER (HCC): ICD-10-CM

## 2018-01-12 LAB
ALBUMIN SERPL-MCNC: 4.3 G/DL (ref 3.5–4.8)
ALP LIVER SERPL-CCNC: 67 U/L (ref 46–118)
ALT SERPL-CCNC: 23 U/L (ref 14–54)
AST SERPL-CCNC: 20 U/L (ref 15–41)
BASOPHILS # BLD AUTO: 0.03 X10(3) UL (ref 0–0.1)
BASOPHILS NFR BLD AUTO: 0.7 %
BILIRUB SERPL-MCNC: 0.8 MG/DL (ref 0.1–2)
BUN BLD-MCNC: 14 MG/DL (ref 8–20)
CALCIUM BLD-MCNC: 10.3 MG/DL (ref 8.3–10.3)
CHLORIDE: 107 MMOL/L (ref 101–111)
CHOLEST SMN-MCNC: 299 MG/DL (ref ?–200)
CO2: 27 MMOL/L (ref 22–32)
CREAT BLD-MCNC: 0.83 MG/DL (ref 0.55–1.02)
EOSINOPHIL # BLD AUTO: 0.09 X10(3) UL (ref 0–0.3)
EOSINOPHIL NFR BLD AUTO: 2 %
ERYTHROCYTE [DISTWIDTH] IN BLOOD BY AUTOMATED COUNT: 12.8 % (ref 11.5–16)
EST. AVERAGE GLUCOSE BLD GHB EST-MCNC: 103 MG/DL (ref 68–126)
GLUCOSE BLD-MCNC: 90 MG/DL (ref 70–99)
HBA1C MFR BLD HPLC: 5.2 % (ref ?–5.7)
HCT VFR BLD AUTO: 43 % (ref 34–50)
HDLC SERPL-MCNC: 48 MG/DL (ref 45–?)
HDLC SERPL: 6.23 {RATIO} (ref ?–4.44)
HGB BLD-MCNC: 14.1 G/DL (ref 12–16)
IMMATURE GRANULOCYTE COUNT: 0.06 X10(3) UL (ref 0–1)
IMMATURE GRANULOCYTE RATIO %: 1.3 %
LDLC SERPL CALC-MCNC: 202 MG/DL (ref ?–130)
LYMPHOCYTES # BLD AUTO: 0.98 X10(3) UL (ref 0.9–4)
LYMPHOCYTES NFR BLD AUTO: 21.7 %
M PROTEIN MFR SERPL ELPH: 7.7 G/DL (ref 6.1–8.3)
MCH RBC QN AUTO: 29.9 PG (ref 27–33.2)
MCHC RBC AUTO-ENTMCNC: 32.8 G/DL (ref 31–37)
MCV RBC AUTO: 91.3 FL (ref 81–100)
MONOCYTES # BLD AUTO: 0.34 X10(3) UL (ref 0.1–0.6)
MONOCYTES NFR BLD AUTO: 7.5 %
NEUTROPHIL ABS PRELIM: 3.02 X10 (3) UL (ref 1.3–6.7)
NEUTROPHILS # BLD AUTO: 3.02 X10(3) UL (ref 1.3–6.7)
NEUTROPHILS NFR BLD AUTO: 66.8 %
NONHDLC SERPL-MCNC: 251 MG/DL (ref ?–130)
PLATELET # BLD AUTO: 244 10(3)UL (ref 150–450)
POTASSIUM SERPL-SCNC: 4.4 MMOL/L (ref 3.6–5.1)
RBC # BLD AUTO: 4.71 X10(6)UL (ref 3.8–5.1)
RED CELL DISTRIBUTION WIDTH-SD: 43 FL (ref 35.1–46.3)
SODIUM SERPL-SCNC: 141 MMOL/L (ref 136–144)
TRIGL SERPL-MCNC: 246 MG/DL (ref ?–150)
VLDLC SERPL CALC-MCNC: 49 MG/DL (ref 5–40)
WBC # BLD AUTO: 4.5 X10(3) UL (ref 4–13)

## 2018-01-12 PROCEDURE — 85025 COMPLETE CBC W/AUTO DIFF WBC: CPT | Performed by: FAMILY MEDICINE

## 2018-01-12 PROCEDURE — G0438 PPPS, INITIAL VISIT: HCPCS | Performed by: FAMILY MEDICINE

## 2018-01-12 PROCEDURE — 83036 HEMOGLOBIN GLYCOSYLATED A1C: CPT | Performed by: FAMILY MEDICINE

## 2018-01-12 PROCEDURE — 80053 COMPREHEN METABOLIC PANEL: CPT | Performed by: FAMILY MEDICINE

## 2018-01-12 PROCEDURE — 96160 PT-FOCUSED HLTH RISK ASSMT: CPT | Performed by: FAMILY MEDICINE

## 2018-01-12 PROCEDURE — 80061 LIPID PANEL: CPT | Performed by: FAMILY MEDICINE

## 2018-01-12 RX ORDER — LEVOTHYROXINE SODIUM 0.05 MG/1
50 TABLET ORAL
Qty: 30 TABLET | Refills: 0 | Status: SHIPPED | OUTPATIENT
Start: 2018-01-12 | End: 2018-02-05

## 2018-01-12 RX ORDER — LEVOTHYROXINE SODIUM 0.05 MG/1
50 TABLET ORAL
Qty: 90 TABLET | Refills: 0 | Status: SHIPPED | OUTPATIENT
Start: 2018-01-12 | End: 2018-01-12

## 2018-01-12 NOTE — ASSESSMENT & PLAN NOTE
Pt consulted with Dr. Flaquito Sheppard, and pt opts for surveillance. Repeat u/s in works. If enlargment noted may need biopsy.

## 2018-01-12 NOTE — PROGRESS NOTES
HPI:   Mike Patel is a 62year old female who presents for a MA (Medicare Advantage) Supervisit (Once per calendar year). Some arthralgias and weight fluctuation.      Annual Physical due on 08/09/2018        Fall/Risk Assessment   She has been sc Results  Component Value Date   CHOLEST 276 (H) 08/08/2017   HDL 46 08/08/2017    (H) 08/08/2017   TRIG 294 (H) 08/08/2017          Last Chemistry Labs:     Lab Results  Component Value Date   AST 17 11/09/2017   ALT 32 11/09/2017   CA 9.7 11/09/201 [facility] biopsy stereotactic nodule 2 site bilat; [facility] biopsy stereotactic nodule 1 site right; ; other; other; other (2017); and colonoscopy (N/A, 11/10/2017). Her family history includes Cancer in her father; Thyroid disease in her sister.    Sally History     There is no immunization history on file for this patient. ASSESSMENT AND OTHER RELEVANT CHRONIC CONDITIONS:   Bakari Connolly is a 62year old female who presents for a Medicare Assessment.      PLAN SUMMARY:   Diagnoses and all orders for Cardiovascular Disease Screening     LDL Annually LDL Cholesterol (mg/dL)   Date Value   08/08/2017 171 (H)        EKG - w/ Initial Preventative Physical Exam only, or if medically necessary Electrocardiogram date       Colorectal Cancer Screening      C injectable drug abusers     Tetanus Toxoid  Only covered with a cut with metal- TD and TDaP Not covered by Medicare Part B No vaccine history found This may be covered with your prescription benefits, but Medicare does not cover unless Medically needed

## 2018-01-12 NOTE — ASSESSMENT & PLAN NOTE
Patient under care of Dr. Sarahy Vigil, Dr. Polly Madrigal. Has follow up scan, scope, and visits planned.

## 2018-01-12 NOTE — PATIENT INSTRUCTIONS
Monica Holguin's SCREENING SCHEDULE   Tests on this list are recommended by your physician but may not be covered, or covered at this frequency, by your insurer. Please check with your insurance carrier before scheduling to verify coverage.    PREVENTATI years- more often if abnormal Colonoscopy,1 Year due on 11/10/2018 Update [facility] if applicable    Flex Sigmoidoscopy Screen  Covered every 5 years No results found for this or any previous visit. No flowsheet data found.      Fecal Occult Blood orders found for this or any previous visit. Please get once after your 65th birthday    Hepatitis B for Moderate/High Risk       No orders found for this or any previous visit.  Medium/high risk factors:   End-stage renal disease   Hemophiliacs who receive

## 2018-01-12 NOTE — ASSESSMENT & PLAN NOTE
Tsh in range but still high. Will increase dose of levothyroxine to 50 mcg and repeat levels in 3 months.

## 2018-01-16 NOTE — PROGRESS NOTES
Addendum:       7. Anal cancer ([facility] [address]  Continue active monitoring with  Dr. Gladys Armenta periodic exams under anesthesia and or lower endoscopy. 8. Hypothyroidism due to medication  The levothyroxine was increased to 50 mcg. Repeat labs in 8-12 weeks.      9. R

## 2018-01-29 ENCOUNTER — TELEPHONE (OUTPATIENT)
Dept: FAMILY MEDICINE CLINIC | Facility: CLINIC | Age: 58
End: 2018-01-29

## 2018-01-29 DIAGNOSIS — C21.0 ANAL CANCER (HCC): Primary | ICD-10-CM

## 2018-01-29 NOTE — TELEPHONE ENCOUNTER
Pt is wanting to know if the referral for surgery has been approved? Pt aware I am working on referrals this week. Pt asking for a call back when surgery approved. charlie    Referral placed.  charlie

## 2018-01-30 PROBLEM — F90.0 ATTENTION DEFICIT HYPERACTIVITY DISORDER (ADHD), PREDOMINANTLY INATTENTIVE TYPE: Status: ACTIVE | Noted: 2018-01-30

## 2018-01-30 RX ORDER — TRAZODONE HYDROCHLORIDE 50 MG/1
50 TABLET ORAL NIGHTLY
Qty: 90 TABLET | Refills: 1 | Status: SHIPPED
Start: 2018-01-30 | End: 2018-08-20

## 2018-01-30 RX ORDER — CARBIDOPA/LEVODOPA 25MG-250MG
1 TABLET ORAL 2 TIMES DAILY
Qty: 90 TABLET | Refills: 1 | Status: SHIPPED
Start: 2018-01-30 | End: 2018-12-10

## 2018-01-30 NOTE — TELEPHONE ENCOUNTER
LOV: 1/12/18 for annual physical    TraZODone HCl 50 MG Oral Tab 90 tablet 1 9/25/2017    Sig :  Take 1 tablet (50 mg total) by mouth nightly.      Route:   Oral       carbidopa-levodopa  MG Oral Tab 90 tablet 1 9/25/2017    Sig :  Take 1 tablet by mo

## 2018-01-30 NOTE — TELEPHONE ENCOUNTER
From: Wendie George  Sent: 1/30/2018 3:14 PM CST  Subject: Medication Renewal Request    Monica Hebert would like a refill of the following medications:     TraZODone HCl 50 MG Oral Tab Jeffery Waller DO]   Patient Comment: send to [facility]

## 2018-02-05 ENCOUNTER — TELEPHONE (OUTPATIENT)
Dept: FAMILY MEDICINE CLINIC | Facility: CLINIC | Age: 58
End: 2018-02-05

## 2018-02-05 RX ORDER — LEVOTHYROXINE SODIUM 0.05 MG/1
50 TABLET ORAL
Qty: 30 TABLET | Refills: 0 | Status: SHIPPED | OUTPATIENT
Start: 2018-02-05 | End: 2018-03-26

## 2018-02-05 NOTE — TELEPHONE ENCOUNTER
Pt asking if she needs an appointment for thyroid med refill. Please leave message on her voicemail with answer. If no appt needed, please refill script.

## 2018-02-05 NOTE — TELEPHONE ENCOUNTER
Medication pended. TSH drawn 1/9/18  Free T4 level low. Did patient need re-draw prior to medication being sent? Please advise.   Levothyroxine Sodium 50 MCG Oral Tab 30 tablet 0 1/12/2018    Sig :  Take 1 tablet (50 mcg total) by mouth before breakfast.

## 2018-02-20 ENCOUNTER — TELEPHONE (OUTPATIENT)
Dept: FAMILY MEDICINE CLINIC | Facility: CLINIC | Age: 58
End: 2018-02-20

## 2018-02-20 NOTE — TELEPHONE ENCOUNTER
Pt calls and states [facility] doesn't have a script for her prep for her upcoming procedure. Pt aware I will send Gol[facility] to the [facility]. Prep sent.  charlie

## 2018-02-27 ENCOUNTER — TELEPHONE (OUTPATIENT)
Dept: FAMILY MEDICINE CLINIC | Facility: CLINIC | Age: 58
End: 2018-02-27

## 2018-02-27 NOTE — TELEPHONE ENCOUNTER
SHE WENT TO [facility] TO  HER MEDICINE FOR HER PROCEDURE ON Friday AND THEY DON'T HAVE ANYTHING SHE SAID.   PLEASE RE-SEND

## 2018-02-27 NOTE — TELEPHONE ENCOUNTER
I called [facility] and spoke to Yulissa. She states she has the script and it is on hold. Yulissa aware to fill script for the pt. I called the pt and LM stating script will be ready for her to pick-up. I advised in the message to call back with questions.  lacey

## 2018-03-01 ENCOUNTER — NURSE ONLY (OUTPATIENT)
Dept: FAMILY MEDICINE CLINIC | Facility: CLINIC | Age: 58
End: 2018-03-01

## 2018-03-01 ENCOUNTER — ANESTHESIA EVENT (OUTPATIENT)
Dept: SURGERY | Facility: HOSPITAL | Age: 58
End: 2018-03-01
Payer: MEDICARE

## 2018-03-01 DIAGNOSIS — E03.9 HYPOTHYROIDISM (ACQUIRED): Primary | ICD-10-CM

## 2018-03-01 LAB
FREE T4: 0.8 NG/DL (ref 0.9–1.8)
THYROXINE (T4): 6.7 UG/DL (ref 4.5–10.9)
TSI SER-ACNC: 3.84 MIU/ML (ref 0.35–5.5)

## 2018-03-01 PROCEDURE — 84443 ASSAY THYROID STIM HORMONE: CPT | Performed by: FAMILY MEDICINE

## 2018-03-01 PROCEDURE — 84439 ASSAY OF FREE THYROXINE: CPT | Performed by: FAMILY MEDICINE

## 2018-03-01 PROCEDURE — 36415 COLL VENOUS BLD VENIPUNCTURE: CPT | Performed by: FAMILY MEDICINE

## 2018-03-02 ENCOUNTER — HOSPITAL ENCOUNTER (OUTPATIENT)
Facility: HOSPITAL | Age: 58
Setting detail: HOSPITAL OUTPATIENT SURGERY
Discharge: HOME | End: 2018-03-02
Attending: SURGERY | Admitting: SURGERY
Payer: MEDICARE

## 2018-03-02 ENCOUNTER — SURGERY (OUTPATIENT)
Age: 58
End: 2018-03-02

## 2018-03-02 ENCOUNTER — ANESTHESIA (OUTPATIENT)
Dept: SURGERY | Facility: HOSPITAL | Age: 58
End: 2018-03-02
Payer: MEDICARE

## 2018-03-02 VITALS
SYSTOLIC BLOOD PRESSURE: 120 MMHG | DIASTOLIC BLOOD PRESSURE: 76 MMHG | HEIGHT: 63 IN | TEMPERATURE: 98 F | RESPIRATION RATE: 16 BRPM | WEIGHT: 185.19 LBS | BODY MASS INDEX: 32.81 KG/M2 | HEART RATE: 71 BPM | OXYGEN SATURATION: 99 %

## 2018-03-02 DIAGNOSIS — Z85.048 HISTORY OF ANAL CANCER: ICD-10-CM

## 2018-03-02 PROCEDURE — 0DJD8ZZ INSPECTION OF LOWER INTESTINAL TRACT, VIA NATURAL OR ARTIFICIAL OPENING ENDOSCOPIC: ICD-10-PCS | Performed by: SURGERY

## 2018-03-02 RX ORDER — MEPERIDINE HYDROCHLORIDE 25 MG/ML
12.5 INJECTION INTRAMUSCULAR; INTRAVENOUS; SUBCUTANEOUS AS NEEDED
Status: DISCONTINUED | OUTPATIENT
Start: 2018-03-02 | End: 2018-03-02

## 2018-03-02 RX ORDER — NALOXONE HYDROCHLORIDE 0.4 MG/ML
80 INJECTION, SOLUTION INTRAMUSCULAR; INTRAVENOUS; SUBCUTANEOUS AS NEEDED
Status: DISCONTINUED | OUTPATIENT
Start: 2018-03-02 | End: 2018-03-02

## 2018-03-02 RX ORDER — METOCLOPRAMIDE HYDROCHLORIDE 5 MG/ML
10 INJECTION INTRAMUSCULAR; INTRAVENOUS AS NEEDED
Status: DISCONTINUED | OUTPATIENT
Start: 2018-03-02 | End: 2018-03-02

## 2018-03-02 RX ORDER — SODIUM CHLORIDE, SODIUM LACTATE, POTASSIUM CHLORIDE, CALCIUM CHLORIDE 600; 310; 30; 20 MG/100ML; MG/100ML; MG/100ML; MG/100ML
INJECTION, SOLUTION INTRAVENOUS CONTINUOUS
Status: DISCONTINUED | OUTPATIENT
Start: 2018-03-02 | End: 2018-03-02

## 2018-03-02 RX ORDER — HEPARIN SODIUM 5000 [USP'U]/ML
5000 INJECTION, SOLUTION INTRAVENOUS; SUBCUTANEOUS ONCE
Status: COMPLETED | OUTPATIENT
Start: 2018-03-02 | End: 2018-03-02

## 2018-03-02 RX ORDER — ACETAMINOPHEN 500 MG
1000 TABLET ORAL ONCE AS NEEDED
Status: DISCONTINUED | OUTPATIENT
Start: 2018-03-02 | End: 2018-03-02

## 2018-03-02 RX ORDER — MIDAZOLAM HYDROCHLORIDE 1 MG/ML
1 INJECTION INTRAMUSCULAR; INTRAVENOUS EVERY 5 MIN PRN
Status: DISCONTINUED | OUTPATIENT
Start: 2018-03-02 | End: 2018-03-02

## 2018-03-02 RX ORDER — DEXAMETHASONE SODIUM PHOSPHATE 4 MG/ML
4 VIAL (ML) INJECTION AS NEEDED
Status: DISCONTINUED | OUTPATIENT
Start: 2018-03-02 | End: 2018-03-02

## 2018-03-02 RX ORDER — HYDROCODONE BITARTRATE AND ACETAMINOPHEN 5; 325 MG/1; MG/1
1 TABLET ORAL AS NEEDED
Status: DISCONTINUED | OUTPATIENT
Start: 2018-03-02 | End: 2018-03-02

## 2018-03-02 RX ORDER — HYDROCODONE BITARTRATE AND ACETAMINOPHEN 5; 325 MG/1; MG/1
2 TABLET ORAL AS NEEDED
Status: DISCONTINUED | OUTPATIENT
Start: 2018-03-02 | End: 2018-03-02

## 2018-03-02 RX ORDER — ONDANSETRON 2 MG/ML
4 INJECTION INTRAMUSCULAR; INTRAVENOUS AS NEEDED
Status: DISCONTINUED | OUTPATIENT
Start: 2018-03-02 | End: 2018-03-02

## 2018-03-02 NOTE — ANESTHESIA POSTPROCEDURE EVALUATION
[address] [facility] [address] Patient Status:  Hospital Outpatient Surgery   Age/Gender 62year old female MRN [id number]   [facility] SURGERY Attending Miguel Resendiz, [address] [facility] Day # 0 PCP Jesse May, DO       Anesthesia Post-op Note

## 2018-03-02 NOTE — OPERATIVE REPORT
[address]    PATIENT'S NAME: Clark Robertson   ATTENDING PHYSICIAN: Val Tate D.O.   OPERATING PHYSICIAN: Val Tate D.O.   PATIENT ACCOUNT#:   [de-identified]    LOCATION:  [address] [place] [address] 10  MEDICAL RECORD #:   [id number]       DA

## 2018-03-02 NOTE — BRIEF OP NOTE
Pre-Operative Diagnosis: History of anal cancer [Z85.048]     Post-Operative Diagnosis: History of anal cancer [Z85.048]     Procedure Performed:   Procedure(s):  ANAL EXAMINATION UNDER ANESTHES    Surgeon(s) and Role:     Nancy Lawler,  - Primary

## 2018-03-02 NOTE — ANESTHESIA PREPROCEDURE EVALUATION
PRE-OP EVALUATION    Patient Name: Elle Mann    Pre-op Diagnosis: History of anal cancer [Z85.048]    Procedure(s):  ANAL EXAMINATION UNDER ANESTHESIA    Surgeon(s) and Role:     Adrienne Cazares, DO - Primary    Pre-op vitals reviewed.         Body m cardiovascular ROS. Endo/Other           (+) hypothyroidism                       Pulmonary    Negative pulmonary ROS.                        Neuro/Psych  Comment: RLS    (+) depression

## 2018-03-02 NOTE — H&P
Joshua Rivero Feb 17 2014  Has been seeing dr Miguel A Arreola and dr Alma Brooks   For the past year   New insurance this June and she needs to find new doctors   Last colonoscopy was 11/16 was normal per the pt   Last saw Ritesh Boles in April 2017  Stage 3B T4N2  Squa GI: denies nausea, vomiting, constipation, diarrhea; no rectal bleeding; no heartburn  GENITAL/: no dysuria, urgency or frequency, no tea colored urine  MUSCULOSKELETAL: no joint complaints upper or lower extremities  HEMATOLOGY: denies hx anemia; mariana

## 2018-03-02 NOTE — OR PREOP
Spoke with Dr. Deny Saxena regarding general versus MAC sedation with regards to placing scopolamine patch. Per DR. Deny Saxena do not place scopolamine patch. Spoke to Dr. Melvi Zapata regarding Heparin 5000 units Sub q. Per Dr. Melvi Zapata administer the Heparin.   [facility]

## 2018-03-06 ENCOUNTER — OFFICE VISIT (OUTPATIENT)
Dept: FAMILY MEDICINE CLINIC | Facility: CLINIC | Age: 58
End: 2018-03-06

## 2018-03-06 VITALS
WEIGHT: 176 LBS | HEIGHT: 63 IN | DIASTOLIC BLOOD PRESSURE: 70 MMHG | HEART RATE: 84 BPM | OXYGEN SATURATION: 97 % | SYSTOLIC BLOOD PRESSURE: 124 MMHG | BODY MASS INDEX: 31.18 KG/M2 | TEMPERATURE: 98 F

## 2018-03-06 DIAGNOSIS — E03.9 HYPOTHYROIDISM (ACQUIRED): Primary | ICD-10-CM

## 2018-03-06 DIAGNOSIS — Z95.828 PORTACATH IN PLACE: ICD-10-CM

## 2018-03-06 DIAGNOSIS — E78.2 MIXED HYPERLIPIDEMIA: ICD-10-CM

## 2018-03-06 PROCEDURE — 99214 OFFICE O/P EST MOD 30 MIN: CPT | Performed by: FAMILY MEDICINE

## 2018-03-06 RX ORDER — ATORVASTATIN CALCIUM 10 MG/1
10 TABLET, FILM COATED ORAL NIGHTLY
Qty: 90 TABLET | Refills: 0 | Status: SHIPPED | OUTPATIENT
Start: 2018-03-06 | End: 2018-11-29

## 2018-03-06 RX ORDER — LEVOTHYROXINE SODIUM 0.07 MG/1
75 TABLET ORAL
Qty: 30 TABLET | Refills: 1 | Status: SHIPPED | OUTPATIENT
Start: 2018-03-06 | End: 2018-04-05

## 2018-03-06 NOTE — PATIENT INSTRUCTIONS
30 days, non fasting labs tsh, total and free t4, ast, alt, ck    90 days fasting labs tsh, total and free t4, ast, alt, ck, lipid panel

## 2018-03-06 NOTE — PROGRESS NOTES
CC: thyroid mgmt    HPI:     Quality hypo, not completely or ideally controlled  Severity moderate  Duration chronic  Modifying factors on the levothyroxine but the tsh still could come down a bit  Associated symptoms some joint aches and stiffness    Lipd mgmt: statin indicated. Labs in 30/90 days. No orders of the defined types were placed in this encounter.       Meds & Refills for this Visit:  Signed Prescriptions Disp Refills    Levothyroxine Sodium 75 MCG Oral Tab 30 tablet 1      Sig: Take 1 tabl

## 2018-03-23 ENCOUNTER — HOSPITAL ENCOUNTER (OUTPATIENT)
Dept: CT IMAGING | Age: 58
Discharge: HOME | End: 2018-03-23
Attending: INTERNAL MEDICINE
Payer: MEDICARE

## 2018-03-23 DIAGNOSIS — N28.1 RENAL CYST, RIGHT: ICD-10-CM

## 2018-03-23 DIAGNOSIS — R91.8 PULMONARY NODULES: ICD-10-CM

## 2018-03-23 DIAGNOSIS — C21.0 ANAL CARCINOMA (HCC): ICD-10-CM

## 2018-03-23 PROCEDURE — 74177 CT ABD & PELVIS W/CONTRAST: CPT | Performed by: INTERNAL MEDICINE

## 2018-03-23 PROCEDURE — 71260 CT THORAX DX C+: CPT | Performed by: INTERNAL MEDICINE

## 2018-04-03 ENCOUNTER — NURSE ONLY (OUTPATIENT)
Dept: FAMILY MEDICINE CLINIC | Facility: CLINIC | Age: 58
End: 2018-04-03

## 2018-04-03 DIAGNOSIS — E78.5 HYPERLIPIDEMIA, UNSPECIFIED HYPERLIPIDEMIA TYPE: ICD-10-CM

## 2018-04-03 DIAGNOSIS — E03.9 HYPOTHYROIDISM (ACQUIRED): Primary | ICD-10-CM

## 2018-04-03 PROCEDURE — 84443 ASSAY THYROID STIM HORMONE: CPT | Performed by: FAMILY MEDICINE

## 2018-04-03 PROCEDURE — 84439 ASSAY OF FREE THYROXINE: CPT | Performed by: FAMILY MEDICINE

## 2018-04-03 PROCEDURE — 36415 COLL VENOUS BLD VENIPUNCTURE: CPT | Performed by: FAMILY MEDICINE

## 2018-04-03 PROCEDURE — 84450 TRANSFERASE (AST) (SGOT): CPT | Performed by: FAMILY MEDICINE

## 2018-04-03 PROCEDURE — 84460 ALANINE AMINO (ALT) (SGPT): CPT | Performed by: FAMILY MEDICINE

## 2018-04-03 PROCEDURE — 82550 ASSAY OF CK (CPK): CPT | Performed by: FAMILY MEDICINE

## 2018-04-05 ENCOUNTER — OFFICE VISIT (OUTPATIENT)
Dept: FAMILY MEDICINE CLINIC | Facility: CLINIC | Age: 58
End: 2018-04-05

## 2018-04-05 VITALS
WEIGHT: 169 LBS | SYSTOLIC BLOOD PRESSURE: 118 MMHG | OXYGEN SATURATION: 98 % | RESPIRATION RATE: 20 BRPM | DIASTOLIC BLOOD PRESSURE: 76 MMHG | HEIGHT: 63 IN | BODY MASS INDEX: 29.95 KG/M2 | TEMPERATURE: 99 F | HEART RATE: 82 BPM

## 2018-04-05 DIAGNOSIS — E03.9 HYPOTHYROIDISM (ACQUIRED): Primary | ICD-10-CM

## 2018-04-05 DIAGNOSIS — E78.2 MIXED HYPERLIPIDEMIA: ICD-10-CM

## 2018-04-05 PROBLEM — J44.89 ASTHMA WITH COPD (CHRONIC OBSTRUCTIVE PULMONARY DISEASE) (HCC): Chronic | Status: ACTIVE | Noted: 2018-04-05

## 2018-04-05 PROBLEM — J44.89 ASTHMA WITH COPD (CHRONIC OBSTRUCTIVE PULMONARY DISEASE): Chronic | Status: ACTIVE | Noted: 2018-04-05

## 2018-04-05 PROBLEM — J44.9 ASTHMA WITH COPD (CHRONIC OBSTRUCTIVE PULMONARY DISEASE) (HCC): Chronic | Status: ACTIVE | Noted: 2018-04-05

## 2018-04-05 PROCEDURE — 99213 OFFICE O/P EST LOW 20 MIN: CPT | Performed by: FAMILY MEDICINE

## 2018-04-05 RX ORDER — LEVOTHYROXINE SODIUM 0.07 MG/1
75 TABLET ORAL
Qty: 90 TABLET | Refills: 1 | Status: SHIPPED | OUTPATIENT
Start: 2018-04-05 | End: 2019-01-28

## 2018-04-05 NOTE — PROGRESS NOTES
CC: follow up thyroid    HPI:     The TSH is stable. She is taking levothyroxine 75 mcg. Feeling generally well. Hyperlipidemia: the statin is not causing elevation ast/alt/ck. No symptoms.      ROS: has lost weight    EXAM:   /76   Pulse 82   Tem

## 2018-05-10 ENCOUNTER — NURSE ONLY (OUTPATIENT)
Dept: HEMATOLOGY/ONCOLOGY | Age: 58
End: 2018-05-10
Attending: FAMILY MEDICINE
Payer: MEDICARE

## 2018-05-10 ENCOUNTER — OFFICE VISIT (OUTPATIENT)
Dept: HEMATOLOGY/ONCOLOGY | Age: 58
End: 2018-05-10
Attending: FAMILY MEDICINE
Payer: MEDICARE

## 2018-05-10 VITALS
TEMPERATURE: 99 F | OXYGEN SATURATION: 99 % | DIASTOLIC BLOOD PRESSURE: 81 MMHG | RESPIRATION RATE: 18 BRPM | BODY MASS INDEX: 30 KG/M2 | WEIGHT: 171.38 LBS | SYSTOLIC BLOOD PRESSURE: 122 MMHG | HEART RATE: 80 BPM

## 2018-05-10 DIAGNOSIS — C21.0 ANAL CANCER (HCC): Primary | ICD-10-CM

## 2018-05-10 DIAGNOSIS — C21.0 ANAL CARCINOMA (HCC): ICD-10-CM

## 2018-05-10 PROCEDURE — 36591 DRAW BLOOD OFF VENOUS DEVICE: CPT

## 2018-05-10 PROCEDURE — 80053 COMPREHEN METABOLIC PANEL: CPT

## 2018-05-10 PROCEDURE — 85025 COMPLETE CBC W/AUTO DIFF WBC: CPT

## 2018-05-10 PROCEDURE — 99213 OFFICE O/P EST LOW 20 MIN: CPT | Performed by: INTERNAL MEDICINE

## 2018-05-10 RX ORDER — SODIUM CHLORIDE 0.9 % (FLUSH) 0.9 %
10 SYRINGE (ML) INJECTION ONCE
Status: COMPLETED | OUTPATIENT
Start: 2018-05-10 | End: 2018-05-10

## 2018-05-10 RX ADMIN — SODIUM CHLORIDE 0.9 % (FLUSH) 10 ML: 0.9 % SYRINGE (ML) INJECTION at 15:00:00

## 2018-05-10 NOTE — PROGRESS NOTES
Cancer Center Progress Note  Patient Name: Wilson Nicole   YOB: 1960   Medical Record Number: [id number]   CSN: [id number]   Attending Physician: Jessica Lamas M.D.        Date of Visit: 5/10/2018    Chief Complaint:  No chief complain today.  No pain. No BRBPR. She would like to have her portacath removed.     Performance Status:  ECOG 1    Past Medical History:  Past Medical History:   Diagnosis Date   • Anal cancer ([facility] [address] 2013   • Anesthesia complication     slow to arouse   • Anxiety Drug use: No    Sexual activity: No     Other Topics Concern    Caffeine Concern Yes    Comment: 1 cup coffee per day    Exercise Yes    Comment: sometimes    Seat Belt Yes     Social History Narrative   None on file       Current Medications:    Sheryle Margo HIVES  Dust                        Comment:Sneezing, sinus infection  Gabapentin              OTHER (SEE COMMENTS)    Comment:swelling  Lexapro [Escitalopr*    SWELLING    Comment:Legs and ankles  Promethazine            NAUSEA ONLY  Quetiapine Fum non-distended, no masses, ascites or hepatosplenomegaly. Extremities Normal - No cyanosis, clubbing or edema.     Integumentary Normal - No rashes and noJaundice   Neurologic Normal - No sensory or motor deficits, normal cerebellar function, normal gait,

## 2018-05-23 ENCOUNTER — PATIENT MESSAGE (OUTPATIENT)
Dept: FAMILY MEDICINE CLINIC | Facility: CLINIC | Age: 58
End: 2018-05-23

## 2018-05-23 ENCOUNTER — TELEPHONE (OUTPATIENT)
Dept: FAMILY MEDICINE CLINIC | Facility: CLINIC | Age: 58
End: 2018-05-23

## 2018-05-23 NOTE — TELEPHONE ENCOUNTER
From: Sis Rai  To: Jaida Catalan DO  Sent: 5/23/2018 3:33 PM CDT  Subject: Prescription Question    HI, Since I have a little asthma problem that mostly is activity induced can I get a prescription for a rescue inhaler since mine is out.    I feel

## 2018-05-25 RX ORDER — ALBUTEROL SULFATE 90 UG/1
2 AEROSOL, METERED RESPIRATORY (INHALATION) EVERY 4 HOURS PRN
Qty: 1 INHALER | Refills: 0 | Status: SHIPPED | OUTPATIENT
Start: 2018-05-25 | End: 2019-05-08

## 2018-05-31 ENCOUNTER — NURSE ONLY (OUTPATIENT)
Dept: FAMILY MEDICINE CLINIC | Facility: CLINIC | Age: 58
End: 2018-05-31

## 2018-05-31 DIAGNOSIS — E78.5 HYPERLIPIDEMIA, UNSPECIFIED HYPERLIPIDEMIA TYPE: Primary | ICD-10-CM

## 2018-05-31 PROCEDURE — 84450 TRANSFERASE (AST) (SGOT): CPT | Performed by: FAMILY MEDICINE

## 2018-05-31 PROCEDURE — 84460 ALANINE AMINO (ALT) (SGPT): CPT | Performed by: FAMILY MEDICINE

## 2018-05-31 PROCEDURE — 80061 LIPID PANEL: CPT | Performed by: FAMILY MEDICINE

## 2018-05-31 PROCEDURE — 82550 ASSAY OF CK (CPK): CPT | Performed by: FAMILY MEDICINE

## 2018-05-31 PROCEDURE — 36415 COLL VENOUS BLD VENIPUNCTURE: CPT | Performed by: FAMILY MEDICINE

## 2018-07-30 ENCOUNTER — PATIENT OUTREACH (OUTPATIENT)
Dept: FAMILY MEDICINE CLINIC | Facility: CLINIC | Age: 58
End: 2018-07-30

## 2018-08-23 ENCOUNTER — PATIENT OUTREACH (OUTPATIENT)
Dept: FAMILY MEDICINE CLINIC | Facility: CLINIC | Age: 58
End: 2018-08-23

## 2018-10-01 ENCOUNTER — TELEPHONE (OUTPATIENT)
Dept: SURGERY | Facility: CLINIC | Age: 58
End: 2018-10-01

## 2018-10-01 ENCOUNTER — PATIENT OUTREACH (OUTPATIENT)
Dept: SURGERY | Facility: CLINIC | Age: 58
End: 2018-10-01

## 2018-10-04 ENCOUNTER — PATIENT MESSAGE (OUTPATIENT)
Dept: FAMILY MEDICINE CLINIC | Facility: CLINIC | Age: 58
End: 2018-10-04

## 2018-10-05 NOTE — TELEPHONE ENCOUNTER
From: Sada Lamas  To: Justina Michel DO  Sent: 10/4/2018 5:30 PM CDT  Subject: Visit Follow-up Question    Hello,  Since I don't have a follow up appointment, I wanted to let you know that maybe it's time to do something.   I have been having trouble w

## 2018-10-08 ENCOUNTER — NURSE ONLY (OUTPATIENT)
Dept: HEMATOLOGY/ONCOLOGY | Age: 58
End: 2018-10-08
Attending: INTERNAL MEDICINE
Payer: MEDICARE

## 2018-10-08 PROCEDURE — 36591 DRAW BLOOD OFF VENOUS DEVICE: CPT

## 2018-10-15 ENCOUNTER — APPOINTMENT (OUTPATIENT)
Dept: LAB | Age: 58
End: 2018-10-15
Attending: FAMILY MEDICINE
Payer: MEDICARE

## 2018-10-15 DIAGNOSIS — Z79.899 ENCOUNTER FOR LONG-TERM (CURRENT) USE OF MEDICATIONS: ICD-10-CM

## 2018-10-15 PROCEDURE — 83036 HEMOGLOBIN GLYCOSYLATED A1C: CPT

## 2018-10-15 PROCEDURE — 36415 COLL VENOUS BLD VENIPUNCTURE: CPT

## 2018-10-19 ENCOUNTER — TELEPHONE (OUTPATIENT)
Dept: FAMILY MEDICINE CLINIC | Facility: CLINIC | Age: 58
End: 2018-10-19

## 2018-10-19 NOTE — TELEPHONE ENCOUNTER
Per mychart message patient to get labs at appointment additional labs may be ordered after evaluation of symptoms. Patient verbalized understanding.

## 2018-10-19 NOTE — TELEPHONE ENCOUNTER
Pt called stated she sent a message through My chart regarding labs and he was going to order thyroid labs for pt Pt would like to get these done and then see Dr. Luigi Barrett for the results.

## 2018-10-25 ENCOUNTER — OFFICE VISIT (OUTPATIENT)
Dept: FAMILY MEDICINE CLINIC | Facility: CLINIC | Age: 58
End: 2018-10-25

## 2018-10-25 VITALS
DIASTOLIC BLOOD PRESSURE: 72 MMHG | HEART RATE: 70 BPM | HEIGHT: 63 IN | OXYGEN SATURATION: 98 % | TEMPERATURE: 98 F | SYSTOLIC BLOOD PRESSURE: 116 MMHG | BODY MASS INDEX: 32.25 KG/M2 | WEIGHT: 182 LBS | RESPIRATION RATE: 16 BRPM

## 2018-10-25 DIAGNOSIS — J44.9 ASTHMA WITH COPD (CHRONIC OBSTRUCTIVE PULMONARY DISEASE) (HCC): ICD-10-CM

## 2018-10-25 DIAGNOSIS — E03.9 HYPOTHYROIDISM (ACQUIRED): Primary | ICD-10-CM

## 2018-10-25 PROCEDURE — 36415 COLL VENOUS BLD VENIPUNCTURE: CPT | Performed by: FAMILY MEDICINE

## 2018-10-25 PROCEDURE — 84439 ASSAY OF FREE THYROXINE: CPT | Performed by: FAMILY MEDICINE

## 2018-10-25 PROCEDURE — 84443 ASSAY THYROID STIM HORMONE: CPT | Performed by: FAMILY MEDICINE

## 2018-10-25 PROCEDURE — 85025 COMPLETE CBC W/AUTO DIFF WBC: CPT | Performed by: FAMILY MEDICINE

## 2018-10-25 PROCEDURE — 99213 OFFICE O/P EST LOW 20 MIN: CPT | Performed by: FAMILY MEDICINE

## 2018-10-25 NOTE — PROGRESS NOTES
CC: fatigue    HPI:     Location all setting  Quality tired all the time \"I cant sleep enough\"  Severity moderate to severe  Duration really at least the last 4-6 weeks  Timing constantly  Modifying factors can identify none  Associated symptoms weight g Inhaler Rfl: 0   Ascorbic Acid (VITAMIN C OR)  Disp:  Rfl:    Levothyroxine Sodium 75 MCG Oral Tab Take 1 tablet (75 mcg total) by mouth before breakfast. Disp: 90 tablet Rfl: 1   NIACIN ER OR  Disp:  Rfl:    NATURAL PSYLLIUM FIBER OR  Disp:  Rfl:    atorv

## 2018-10-26 ENCOUNTER — TELEPHONE (OUTPATIENT)
Dept: HEMATOLOGY/ONCOLOGY | Facility: HOSPITAL | Age: 58
End: 2018-10-26

## 2018-10-26 NOTE — TELEPHONE ENCOUNTER
Meghan Alonso MD   You 44 minutes ago (2:05 PM)      Her blood count is fine. Chantal Nelson is no problem with her white blood cell count being slightly low. (Routing comment)      Message left for the patient.

## 2018-10-30 RX ORDER — POLYETHYLENE GLYCOL 3350, SODIUM CHLORIDE, SODIUM BICARBONATE, POTASSIUM CHLORIDE 420; 11.2; 5.72; 1.48 G/4L; G/4L; G/4L; G/4L
POWDER, FOR SOLUTION ORAL
Qty: 1 BOTTLE | Refills: 0 | Status: SHIPPED | OUTPATIENT
Start: 2018-10-30 | End: 2019-03-22

## 2018-11-16 RX ORDER — LEVOTHYROXINE SODIUM 0.07 MG/1
TABLET ORAL
Qty: 30 TABLET | Refills: 1 | Status: SHIPPED | OUTPATIENT
Start: 2018-11-16 | End: 2019-01-15

## 2018-11-16 NOTE — TELEPHONE ENCOUNTER
LOV: 10/25/18 for hypothyroidism  TSH: 10/25/18     Levothyroxine Sodium 75 MCG Oral Tab 90 tablet 1 4/5/2018    Sig :  Take 1 tablet (75 mcg total) by mouth before breakfast.     Route:   Oral       Future Appointments   Date Time Provider Department Cent

## 2018-12-10 NOTE — TELEPHONE ENCOUNTER
LOV: 10/25/18 for hypothyroidism    carbidopa-levodopa  MG Oral Tab 90 tablet 1 1/30/2018    Sig :  Take 1 tablet by mouth 2 (two) times daily.      Route:   Oral       Future Appointments   Date Time Provider Marie Cerna   1/28/2019  3:15 PM Ca

## 2018-12-11 RX ORDER — CARBIDOPA/LEVODOPA 25MG-250MG
1 TABLET ORAL 2 TIMES DAILY
Qty: 90 TABLET | Refills: 1 | Status: SHIPPED | OUTPATIENT
Start: 2018-12-11 | End: 2019-03-22

## 2019-01-02 ENCOUNTER — PATIENT OUTREACH (OUTPATIENT)
Dept: SURGERY | Facility: CLINIC | Age: 59
End: 2019-01-02

## 2019-01-15 RX ORDER — LEVOTHYROXINE SODIUM 0.07 MG/1
TABLET ORAL
Qty: 30 TABLET | Refills: 1 | Status: SHIPPED | OUTPATIENT
Start: 2019-01-15 | End: 2019-03-22

## 2019-01-15 NOTE — TELEPHONE ENCOUNTER
LOV: 10/25/18 for hypothyroidism  TSH: 10/25/18    LEVOTHYROXINE SODIUM 75 MCG Oral Tab 30 tablet 1 2018    Sig : [place]  1 TABLET BY MOUTH ONCE DAILY BEFORE BREAKFAST     Route:   (none)       Future Appointments   Date Time Provider Marie Cerna

## 2019-01-21 ENCOUNTER — PATIENT OUTREACH (OUTPATIENT)
Dept: FAMILY MEDICINE CLINIC | Facility: CLINIC | Age: 59
End: 2019-01-21

## 2019-03-01 ENCOUNTER — TELEPHONE (OUTPATIENT)
Dept: HEMATOLOGY/ONCOLOGY | Facility: HOSPITAL | Age: 59
End: 2019-03-01

## 2019-03-01 ENCOUNTER — NURSE ONLY (OUTPATIENT)
Dept: HEMATOLOGY/ONCOLOGY | Age: 59
End: 2019-03-01
Attending: INTERNAL MEDICINE
Payer: MEDICARE

## 2019-03-01 DIAGNOSIS — C21.0 ANAL CARCINOMA (HCC): Primary | ICD-10-CM

## 2019-03-01 PROCEDURE — 96523 IRRIG DRUG DELIVERY DEVICE: CPT

## 2019-03-01 RX ORDER — SODIUM CHLORIDE 0.9 % (FLUSH) 0.9 %
10 SYRINGE (ML) INJECTION ONCE
Status: COMPLETED | OUTPATIENT
Start: 2019-03-01 | End: 2019-03-01

## 2019-03-01 RX ADMIN — SODIUM CHLORIDE 0.9 % (FLUSH) 10 ML: 0.9 % SYRINGE (ML) INJECTION at 15:03:00

## 2019-03-22 ENCOUNTER — OFFICE VISIT (OUTPATIENT)
Dept: FAMILY MEDICINE CLINIC | Facility: CLINIC | Age: 59
End: 2019-03-22
Payer: MEDICARE

## 2019-03-22 VITALS
SYSTOLIC BLOOD PRESSURE: 122 MMHG | BODY MASS INDEX: 34.73 KG/M2 | RESPIRATION RATE: 16 BRPM | OXYGEN SATURATION: 99 % | TEMPERATURE: 98 F | WEIGHT: 196 LBS | DIASTOLIC BLOOD PRESSURE: 80 MMHG | HEIGHT: 63 IN | HEART RATE: 80 BPM

## 2019-03-22 DIAGNOSIS — Z13.6 SCREENING FOR CARDIOVASCULAR CONDITION: ICD-10-CM

## 2019-03-22 DIAGNOSIS — Z00.00 ENCOUNTER FOR ANNUAL HEALTH EXAMINATION: Primary | ICD-10-CM

## 2019-03-22 DIAGNOSIS — E03.9 HYPOTHYROIDISM (ACQUIRED): ICD-10-CM

## 2019-03-22 DIAGNOSIS — C21.0 ANAL CARCINOMA (HCC): ICD-10-CM

## 2019-03-22 DIAGNOSIS — Z13.1 SCREENING FOR DIABETES MELLITUS (DM): ICD-10-CM

## 2019-03-22 DIAGNOSIS — Z12.39 BREAST CANCER SCREENING: ICD-10-CM

## 2019-03-22 DIAGNOSIS — F90.0 ATTENTION DEFICIT HYPERACTIVITY DISORDER (ADHD), PREDOMINANTLY INATTENTIVE TYPE: ICD-10-CM

## 2019-03-22 DIAGNOSIS — J44.9 ASTHMA WITH COPD (CHRONIC OBSTRUCTIVE PULMONARY DISEASE) (HCC): Chronic | ICD-10-CM

## 2019-03-22 DIAGNOSIS — N28.89 RENAL MASS: ICD-10-CM

## 2019-03-22 DIAGNOSIS — Z78.0 POSTMENOPAUSAL: ICD-10-CM

## 2019-03-22 LAB
ALBUMIN SERPL-MCNC: 4.2 G/DL (ref 3.4–5)
ALBUMIN/GLOB SERPL: 1.4 {RATIO} (ref 1–2)
ALP LIVER SERPL-CCNC: 58 U/L (ref 46–118)
ALT SERPL-CCNC: 22 U/L (ref 13–56)
ANION GAP SERPL CALC-SCNC: 6 MMOL/L (ref 0–18)
AST SERPL-CCNC: 17 U/L (ref 15–37)
BASOPHILS # BLD AUTO: 0.02 X10(3) UL (ref 0–0.2)
BASOPHILS NFR BLD AUTO: 0.5 %
BILIRUB SERPL-MCNC: 0.4 MG/DL (ref 0.1–2)
BUN BLD-MCNC: 15 MG/DL (ref 7–18)
BUN/CREAT SERPL: 18.1 (ref 10–20)
CALCIUM BLD-MCNC: 9.9 MG/DL (ref 8.5–10.1)
CHLORIDE SERPL-SCNC: 107 MMOL/L (ref 98–107)
CHOLEST SMN-MCNC: 295 MG/DL (ref ?–200)
CO2 SERPL-SCNC: 27 MMOL/L (ref 21–32)
CREAT BLD-MCNC: 0.83 MG/DL (ref 0.55–1.02)
DEPRECATED RDW RBC AUTO: 42.7 FL (ref 35.1–46.3)
EOSINOPHIL # BLD AUTO: 0.09 X10(3) UL (ref 0–0.7)
EOSINOPHIL NFR BLD AUTO: 2.5 %
ERYTHROCYTE [DISTWIDTH] IN BLOOD BY AUTOMATED COUNT: 13.1 % (ref 11–15)
EST. AVERAGE GLUCOSE BLD GHB EST-MCNC: 105 MG/DL (ref 68–126)
GLOBULIN PLAS-MCNC: 3 G/DL (ref 2.8–4.4)
GLUCOSE BLD-MCNC: 91 MG/DL (ref 70–99)
HBA1C MFR BLD HPLC: 5.3 % (ref ?–5.7)
HCT VFR BLD AUTO: 40.1 % (ref 35–48)
HDLC SERPL-MCNC: 43 MG/DL (ref 40–59)
HGB BLD-MCNC: 13.3 G/DL (ref 12–16)
IMM GRANULOCYTES # BLD AUTO: 0.02 X10(3) UL (ref 0–1)
IMM GRANULOCYTES NFR BLD: 0.5 %
LDLC SERPL CALC-MCNC: 206 MG/DL (ref ?–100)
LYMPHOCYTES # BLD AUTO: 1.18 X10(3) UL (ref 1–4)
LYMPHOCYTES NFR BLD AUTO: 32.2 %
M PROTEIN MFR SERPL ELPH: 7.2 G/DL (ref 6.4–8.2)
MCH RBC QN AUTO: 30 PG (ref 26–34)
MCHC RBC AUTO-ENTMCNC: 33.2 G/DL (ref 31–37)
MCV RBC AUTO: 90.3 FL (ref 80–100)
MONOCYTES # BLD AUTO: 0.23 X10(3) UL (ref 0.1–1)
MONOCYTES NFR BLD AUTO: 6.3 %
NEUTROPHILS # BLD AUTO: 2.12 X10 (3) UL (ref 1.5–7.7)
NEUTROPHILS # BLD AUTO: 2.12 X10(3) UL (ref 1.5–7.7)
NEUTROPHILS NFR BLD AUTO: 58 %
NONHDLC SERPL-MCNC: 252 MG/DL (ref ?–130)
OSMOLALITY SERPL CALC.SUM OF ELEC: 290 MOSM/KG (ref 275–295)
PLATELET # BLD AUTO: 222 10(3)UL (ref 150–450)
POTASSIUM SERPL-SCNC: 4.3 MMOL/L (ref 3.5–5.1)
RBC # BLD AUTO: 4.44 X10(6)UL (ref 3.8–5.3)
SODIUM SERPL-SCNC: 140 MMOL/L (ref 136–145)
TRIGL SERPL-MCNC: 232 MG/DL (ref 30–149)
TSI SER-ACNC: 2.2 MIU/ML (ref 0.36–3.74)
VLDLC SERPL CALC-MCNC: 46 MG/DL (ref 0–30)
WBC # BLD AUTO: 3.7 X10(3) UL (ref 4–11)

## 2019-03-22 PROCEDURE — 96160 PT-FOCUSED HLTH RISK ASSMT: CPT | Performed by: FAMILY MEDICINE

## 2019-03-22 PROCEDURE — 80053 COMPREHEN METABOLIC PANEL: CPT | Performed by: FAMILY MEDICINE

## 2019-03-22 PROCEDURE — 85025 COMPLETE CBC W/AUTO DIFF WBC: CPT | Performed by: FAMILY MEDICINE

## 2019-03-22 PROCEDURE — 83036 HEMOGLOBIN GLYCOSYLATED A1C: CPT | Performed by: FAMILY MEDICINE

## 2019-03-22 PROCEDURE — G0439 PPPS, SUBSEQ VISIT: HCPCS | Performed by: FAMILY MEDICINE

## 2019-03-22 PROCEDURE — 84443 ASSAY THYROID STIM HORMONE: CPT | Performed by: FAMILY MEDICINE

## 2019-03-22 PROCEDURE — 36415 COLL VENOUS BLD VENIPUNCTURE: CPT | Performed by: FAMILY MEDICINE

## 2019-03-22 PROCEDURE — 80061 LIPID PANEL: CPT | Performed by: FAMILY MEDICINE

## 2019-03-22 PROCEDURE — 99396 PREV VISIT EST AGE 40-64: CPT | Performed by: FAMILY MEDICINE

## 2019-03-22 RX ORDER — LEVOTHYROXINE SODIUM 0.07 MG/1
TABLET ORAL
Qty: 90 TABLET | Refills: 0 | Status: SHIPPED | OUTPATIENT
Start: 2019-03-22 | End: 2019-06-24

## 2019-03-22 RX ORDER — CARBIDOPA/LEVODOPA 25MG-250MG
1 TABLET ORAL 2 TIMES DAILY
Qty: 180 TABLET | Refills: 0 | Status: SHIPPED | OUTPATIENT
Start: 2019-03-22 | End: 2019-05-08

## 2019-03-22 NOTE — PROGRESS NOTES
HPI:   Sheri Conley is a 61year old female who presents for a MA (Medicare Advantage) Supervisit (Once per calendar year). Generally feeling well.    Her last annual assessment has been over 1 year: Annual Physical due on 01/12/2019         Fall/Ri Discussed       She does NOT have a Power of  for [employer] on file in [facility] Not Discussed         She has never smoked tobacco.    CAGE Alcohol screening   Taylor Rios was screened for Alcohol abuse and had a score of 0 so is at low risk. times daily. NON FORMULARY Apricot seeds 3 per day   Estradiol (ESTRACE) 0.1 MG/GM Vaginal Cream Place 1 g vaginally twice a week.       MEDICAL INFORMATION:   She  has a past medical history of Anal cancer ([facility] [address] (2013), Anesthesia complication, Anxiety s Visual Acuity: Corrected Right Eye Chart Acuity: 20/20   Left Eye Visual Acuity: Corrected Left Eye Chart Acuity: 20/20   Both Eyes Visual Acuity: Corrected Both Eyes Chart Acuity: 20/20   Able To Tolerate Visual Acuity: Yes      /80   Pulse 80   Tem with copd (chronic obstructive pulmonary disease) (hcc)  Anal carcinoma (hcc)  Screening for cardiovascular condition  Encounter for annual health examination  (primary encounter diagnosis)  Postmenopausal  Screening for diabetes mellitus (dm)  Renal mass more than 10 pounds without trying?: 2 - No  Has your appetite been poor?: No  How does the patient maintain a good energy level?: Other(sleep well)  How would you describe your daily physical activity?: Light  How would you describe your current health st 07/07/2018 Update Health Maintenance if applicable     Immunizations (Update Immunization Activity if applicable)     Influenza  Covered Annually  Please get every year    Pneumococcal 13 (Prevnar)  Covered Once after 65 No vaccine history found Please get

## 2019-03-22 NOTE — PATIENT INSTRUCTIONS
Monica Holguin's SCREENING SCHEDULE   Tests on this list are recommended by your physician but may not be covered, or covered at this frequency, by your insurer. Please check with your insurance carrier before scheduling to verify coverage.    PREVENTATI years- more often if abnormal Colonoscopy due on 12/05/2021 Update [facility] if applicable    Flex Sigmoidoscopy Screen  Covered every 5 years No results found for this or any previous visit. No flowsheet data found.      Fecal Occult Blood   Cover found for this or any previous visit. Please get once after your 65th birthday    Hepatitis B for Moderate/High Risk       No orders found for this or any previous visit.  Medium/high risk factors:   End-stage renal disease   Hemophiliacs who received Facto

## 2019-03-29 ENCOUNTER — TELEPHONE (OUTPATIENT)
Dept: FAMILY MEDICINE CLINIC | Facility: CLINIC | Age: 59
End: 2019-03-29

## 2019-03-29 DIAGNOSIS — E66.9 OBESITY (BMI 30.0-34.9): Primary | ICD-10-CM

## 2019-03-29 NOTE — TELEPHONE ENCOUNTER
PT called stated she needs a referral for the [address] [place] [address] [facility]. Her apt April 17.

## 2019-04-29 ENCOUNTER — TELEPHONE (OUTPATIENT)
Dept: FAMILY MEDICINE CLINIC | Facility: CLINIC | Age: 59
End: 2019-04-29

## 2019-04-29 ENCOUNTER — PATIENT MESSAGE (OUTPATIENT)
Dept: FAMILY MEDICINE CLINIC | Facility: CLINIC | Age: 59
End: 2019-04-29

## 2019-04-29 DIAGNOSIS — C21.0 ANAL CANCER (HCC): Primary | ICD-10-CM

## 2019-04-29 NOTE — TELEPHONE ENCOUNTER
From: Dano Olivarez  To: Warren Worthy DO  Sent: 4/29/2019 1:19 PM CDT  Subject: Referral Request    I have a 6 month follow -up appointment with Dr. Bard Donato on May 2nd. They just called today to say I needed a referral for it.  Please put in a referr

## 2019-05-02 ENCOUNTER — NURSE ONLY (OUTPATIENT)
Dept: HEMATOLOGY/ONCOLOGY | Age: 59
End: 2019-05-02
Attending: INTERNAL MEDICINE
Payer: MEDICARE

## 2019-05-02 ENCOUNTER — OFFICE VISIT (OUTPATIENT)
Dept: HEMATOLOGY/ONCOLOGY | Age: 59
End: 2019-05-02
Attending: INTERNAL MEDICINE
Payer: MEDICARE

## 2019-05-02 VITALS
WEIGHT: 196 LBS | HEART RATE: 75 BPM | SYSTOLIC BLOOD PRESSURE: 137 MMHG | TEMPERATURE: 98 F | DIASTOLIC BLOOD PRESSURE: 75 MMHG | BODY MASS INDEX: 35 KG/M2 | OXYGEN SATURATION: 98 % | RESPIRATION RATE: 16 BRPM

## 2019-05-02 DIAGNOSIS — Z85.048 HISTORY OF ANAL CANCER: Primary | ICD-10-CM

## 2019-05-02 DIAGNOSIS — C21.0 ANAL CANCER (HCC): ICD-10-CM

## 2019-05-02 PROCEDURE — 99213 OFFICE O/P EST LOW 20 MIN: CPT | Performed by: INTERNAL MEDICINE

## 2019-05-02 PROCEDURE — 36591 DRAW BLOOD OFF VENOUS DEVICE: CPT

## 2019-05-02 NOTE — PROGRESS NOTES
Cancer Center Progress Note  Patient Name: Leighann Fung   YOB: 1960   Medical Record Number: [id number]   CSN: [id number]   Attending Physician: Hiwot Galeano M.D.        Date of Visit: 5/2/2019    Chief Complaint:  Patient presents w gain. No F/C/NS.      Performance Status:  ECOG 1    Past Medical History:  Past Medical History:   Diagnosis Date   • Anal cancer ([facility] [address] 2013   • Anesthesia complication     slow to arouse   • Anxiety state    • Asthma     excerise induced   • Back problem education: Not on file      Highest education level: Not on file    Occupational History      Not on file    Social Needs      Financial resource strain: Not on file      Food insecurity:        Worry: Not on file        Inability: Not on file      Transpo BEFORE BREAKFAST, Disp: 90 tablet, Rfl: 0  •  carbidopa-levodopa  MG Oral Tab, Take 1 tablet by mouth 2 (two) times daily. , Disp: 180 tablet, Rfl: 0  •  PSYLLIUM FIBER OR, , Disp: , Rfl:   •  ARIPiprazole (ABILIFY) 2 MG Oral Tab, Take 1 tablet (2 mg No rashes or yellowing of the skin   Neurologic No headache, blurred vision, and no areas of focal weakness. Normal gait. Psychiatric No insomnia, depression, phyllis or mood swings.          Vital Signs:  Height: --  Weight: 88.9 kg (196 lb) (05/02 1042) Range: >=60  89   ANION GAP Latest Ref Range: 0 - 18 mmol/L 6   CALCULATED OSMOLALITY Latest Ref Range: 275 - 295 mOsm/kg 290   ALKALINE PHOSPHATASE Latest Ref Range: 46 - 118 U/L 58   AST (SGOT) Latest Ref Range: 15 - 37 U/L 17   ALT (SGPT) Latest Ref Ran Canal cancer:  KASSANDRA. Continue follow up with surgery. RTC 1 year. Planned Follow Up:  12 months    I spent 15 minutes face to face with the patient. More than 50% of that time was spent counseling the patient and/or on coordination of care.   The manuela

## 2019-05-08 NOTE — TELEPHONE ENCOUNTER
LOV 3/22/19 for an annual exam.    carbidopa-levodopa  MG Oral Tab 180 tablet 0 3/22/2019    Sig:   Take 1 tablet by mouth 2 (two) times daily.      Route:   Oral       Albuterol Sulfate HFA (PROAIR HFA) 108 (90 Base) MCG/ACT Inhalation Aero Soln 1 In

## 2019-05-10 ENCOUNTER — TELEPHONE (OUTPATIENT)
Dept: FAMILY MEDICINE CLINIC | Facility: CLINIC | Age: 59
End: 2019-05-10

## 2019-05-10 RX ORDER — CARBIDOPA/LEVODOPA 25MG-250MG
TABLET ORAL
Qty: 180 TABLET | Refills: 0 | Status: SHIPPED | OUTPATIENT
Start: 2019-05-10 | End: 2019-05-21

## 2019-05-10 RX ORDER — ALBUTEROL SULFATE 90 UG/1
2 AEROSOL, METERED RESPIRATORY (INHALATION) EVERY 4 HOURS PRN
Qty: 1 INHALER | Refills: 0 | Status: SHIPPED | OUTPATIENT
Start: 2019-05-10 | End: 2021-01-29

## 2019-05-10 RX ORDER — CARBIDOPA/LEVODOPA 25MG-250MG
1 TABLET ORAL 2 TIMES DAILY
Qty: 180 TABLET | Refills: 0 | Status: SHIPPED | OUTPATIENT
Start: 2019-05-10 | End: 2019-09-18

## 2019-05-10 NOTE — TELEPHONE ENCOUNTER
This provider is not within patient's insurance network. Referral would not be authorized.   Left message to call back to notify patient

## 2019-05-10 NOTE — TELEPHONE ENCOUNTER
LOV: 3/22/19 for annual health exam    carbidopa-levodopa  MG Oral Tab 180 tablet 0 3/22/2019    Sig:   Take 1 tablet by mouth 2 (two) times daily.      Route:   Oral       Future Appointments   Date Time Provider Marie Cerna   5/20/2019  3:30 P

## 2019-05-10 NOTE — TELEPHONE ENCOUNTER
Needs referral to Chiropractor-going to see Dr. Shilo Soliz at [facility] in [place]. Is going Monday-she said she does not need a referral for her first visit but will need for follow up visits.

## 2019-05-13 ENCOUNTER — TELEPHONE (OUTPATIENT)
Dept: FAMILY MEDICINE CLINIC | Facility: CLINIC | Age: 59
End: 2019-05-13

## 2019-05-15 ENCOUNTER — TELEPHONE (OUTPATIENT)
Dept: HEMATOLOGY/ONCOLOGY | Facility: HOSPITAL | Age: 59
End: 2019-05-15

## 2019-05-15 DIAGNOSIS — Z85.9 HISTORY OF CANCER: Primary | ICD-10-CM

## 2019-05-15 NOTE — TELEPHONE ENCOUNTER
Pt states that her chiropractor saw something on her femur. She has actual films. Dr. Jose C Vega informed. He will order imaging. Patient is not having any pain. She will find out from her chiropractor exactly where on the femur he saw something.

## 2019-05-16 NOTE — TELEPHONE ENCOUNTER
TCB for patient to call back      Spoke with Melanie White who manages patient's Miguel A Roberts hmo  Ref # [id number]  Out of network    Patient can this number who manages her HMO to verify as well. [phone]

## 2019-05-17 ENCOUNTER — TELEPHONE (OUTPATIENT)
Dept: FAMILY MEDICINE CLINIC | Facility: CLINIC | Age: 59
End: 2019-05-17

## 2019-05-17 DIAGNOSIS — R22.40 MASS OF LOWER EXTREMITY, UNSPECIFIED LATERALITY: Primary | ICD-10-CM

## 2019-05-17 NOTE — TELEPHONE ENCOUNTER
Pt just called insurance benefit line  and  Is still under contract with [facility]. Reference number: [id number]  Phone:  [phone]      Dr. Yobany Cisneros.     Asking for referral  to be backdated to Monday

## 2019-05-17 NOTE — TELEPHONE ENCOUNTER
GIANCARLOTCB  Did patient call the number I gave her?   She was suppose to call [phone]    Number patient listed was [phone]

## 2019-05-20 ENCOUNTER — TELEPHONE (OUTPATIENT)
Dept: HEMATOLOGY/ONCOLOGY | Facility: HOSPITAL | Age: 59
End: 2019-05-20

## 2019-05-20 NOTE — TELEPHONE ENCOUNTER
LM to notify patient    Referral authorized- only 8 visits at a time.   Referral faxed to Dr. Augustine Santana [phone]

## 2019-05-20 NOTE — PROGRESS NOTES
HISTORY OF PRESENT ILLNESS  Patient presents with:  Weight Problem: referred by Dr Kami Robles is a 61year old female new to our office today for initiation of medical weight loss program.  Patient presents today with c/o excess weight ov disease: hypothyroid  Constipation: intermittent  Other pertinent hx: fibromyalgia, RLS  Sleep Apnea hx: negative  Cancer hx: Anal cancer  Family or personal history of Pancreatic issues / Medullary Thyroid Cancer: negative   History of bariatric surgery: .0 03/22/2019     Lab Results   Component Value Date    GLU 91 03/22/2019    BUN 15 03/22/2019    BUNCREA 18.1 03/22/2019    CREATSERUM 0.83 03/22/2019    ANIONGAP 6 03/22/2019    GFR 78 01/12/2018    GFRNAA 77 03/22/2019    GFRAA 89 03/22/2019    C Tube Rfl: 3     No current facility-administered medications on file prior to visit.      ASSESSMENT  Initial Weight Data and Goal Weight Loss:       Diagnoses and all orders for this visit:    Encounter for therapeutic drug monitoring  - reviewed PMH in EM dieticians. Bring along your food journal (3 days minimum). See journal options below. 2.  Complete  Non fasting labs at [facility] lab site prior to next office visit. 3.  Fill your prescribed medication and take as discussed and prescribed:    4.   Bring re meditation using the CALM Davion or another comparable alternative can be done in your home or place of work with little time commitment. This Davion can also help work on behavior change and improve sleep. Check out www.[email or web address] org blog for [email or web address].[email or web address] [employer]ands

## 2019-05-20 NOTE — TELEPHONE ENCOUNTER
Pt left VM stating she called last week regarding spot that was found to her left hip after completed x-rays with her chiropractor. Would like to know what MD advises.     Per telephone note on 5/15- Dr. Lizbeth Ashley was made aware and order additional imaging

## 2019-05-21 ENCOUNTER — OFFICE VISIT (OUTPATIENT)
Dept: INTERNAL MEDICINE CLINIC | Facility: CLINIC | Age: 59
End: 2019-05-21
Payer: MEDICARE

## 2019-05-21 VITALS
HEART RATE: 68 BPM | DIASTOLIC BLOOD PRESSURE: 80 MMHG | BODY MASS INDEX: 34.47 KG/M2 | SYSTOLIC BLOOD PRESSURE: 120 MMHG | HEIGHT: 63.5 IN | WEIGHT: 197 LBS | RESPIRATION RATE: 14 BRPM

## 2019-05-21 DIAGNOSIS — M19.90 OSTEOARTHRITIS, UNSPECIFIED OSTEOARTHRITIS TYPE, UNSPECIFIED SITE: ICD-10-CM

## 2019-05-21 DIAGNOSIS — E66.01 SEVERE OBESITY (HCC): ICD-10-CM

## 2019-05-21 DIAGNOSIS — Z51.81 ENCOUNTER FOR THERAPEUTIC DRUG MONITORING: Primary | ICD-10-CM

## 2019-05-21 DIAGNOSIS — Z78.0 MENOPAUSE: ICD-10-CM

## 2019-05-21 PROCEDURE — 99214 OFFICE O/P EST MOD 30 MIN: CPT | Performed by: NURSE PRACTITIONER

## 2019-05-21 RX ORDER — PHENTERMINE HYDROCHLORIDE 15 MG/1
15 CAPSULE ORAL EVERY MORNING
Qty: 30 CAPSULE | Refills: 0 | Status: SHIPPED | OUTPATIENT
Start: 2019-05-21 | End: 2019-06-11

## 2019-05-21 NOTE — PATIENT INSTRUCTIONS
Welcome to the [facility]...your Lifestyle Renovation begins now! Thank you for placing your trust in our health care team, I look forward to working with you along this journey to better health!     Next steps:     1.  Sched exercising to help establish a routine. If not already exercising begin with 1 day and progress as able with long-term goal of 30 minutes 5 days a week at a minimum. Meditation daily can help manage and control stress.  Chronic stress can make weight los

## 2019-05-23 ENCOUNTER — HOSPITAL ENCOUNTER (OUTPATIENT)
Dept: BONE DENSITY | Age: 59
Discharge: HOME | End: 2019-05-23
Attending: FAMILY MEDICINE
Payer: MEDICARE

## 2019-05-23 ENCOUNTER — HOSPITAL ENCOUNTER (OUTPATIENT)
Dept: GENERAL RADIOLOGY | Age: 59
Discharge: HOME | End: 2019-05-23
Attending: INTERNAL MEDICINE
Payer: MEDICARE

## 2019-05-23 ENCOUNTER — HOSPITAL ENCOUNTER (OUTPATIENT)
Dept: MAMMOGRAPHY | Age: 59
Discharge: HOME | End: 2019-05-23
Attending: FAMILY MEDICINE
Payer: MEDICARE

## 2019-05-23 DIAGNOSIS — M89.8X5 LYTIC BONE LESION OF HIP: ICD-10-CM

## 2019-05-23 DIAGNOSIS — Z12.39 BREAST CANCER SCREENING: ICD-10-CM

## 2019-05-23 DIAGNOSIS — Z00.00 ENCOUNTER FOR ANNUAL HEALTH EXAMINATION: ICD-10-CM

## 2019-05-23 DIAGNOSIS — Z78.0 POSTMENOPAUSAL: ICD-10-CM

## 2019-05-23 DIAGNOSIS — Z85.048 HISTORY OF ANAL CANCER: ICD-10-CM

## 2019-05-23 PROCEDURE — 77080 DXA BONE DENSITY AXIAL: CPT | Performed by: FAMILY MEDICINE

## 2019-05-23 PROCEDURE — 73502 X-RAY EXAM HIP UNI 2-3 VIEWS: CPT | Performed by: INTERNAL MEDICINE

## 2019-05-23 PROCEDURE — 77067 SCR MAMMO BI INCL CAD: CPT | Performed by: FAMILY MEDICINE

## 2019-05-28 ENCOUNTER — PATIENT MESSAGE (OUTPATIENT)
Dept: FAMILY MEDICINE CLINIC | Facility: CLINIC | Age: 59
End: 2019-05-28

## 2019-05-28 NOTE — TELEPHONE ENCOUNTER
From: Karel Patel  To: Aubree Diaz DO  Sent: 5/28/2019 2:01 PM CDT  Subject: Other    I had x-rays taken at the chiropractors office can I drop off the x-rays taken so you can see what they found?  Since then I also had some taken here at [facility] too

## 2019-05-29 ENCOUNTER — TELEPHONE (OUTPATIENT)
Dept: FAMILY MEDICINE CLINIC | Facility: CLINIC | Age: 59
End: 2019-05-29

## 2019-05-29 DIAGNOSIS — R22.40 MASS OF LOWER EXTREMITY, UNSPECIFIED LATERALITY: Primary | ICD-10-CM

## 2019-06-04 ENCOUNTER — TELEPHONE (OUTPATIENT)
Dept: FAMILY MEDICINE CLINIC | Facility: CLINIC | Age: 59
End: 2019-06-04

## 2019-06-04 NOTE — TELEPHONE ENCOUNTER
Pt dropped of evaluation from [facility]  Page 5 has notation marked. Put paperwork in Dwayne's red file 6/4.

## 2019-06-06 NOTE — TELEPHONE ENCOUNTER
Appointment scheduled.   Future Appointments   Date Time Provider Marie Eryn   6/11/2019  9:20 AM Nick Guerra DO [facility] [place]   6/18/2019  2:40 PM CLAUDIA Díaz [facility] [address]   7/2/2019  3:00 PM [facility]elle [facility]valles [facility]

## 2019-06-11 ENCOUNTER — OFFICE VISIT (OUTPATIENT)
Dept: FAMILY MEDICINE CLINIC | Facility: CLINIC | Age: 59
End: 2019-06-11
Payer: MEDICARE

## 2019-06-11 VITALS
DIASTOLIC BLOOD PRESSURE: 84 MMHG | TEMPERATURE: 99 F | RESPIRATION RATE: 18 BRPM | HEIGHT: 63.5 IN | WEIGHT: 194 LBS | OXYGEN SATURATION: 98 % | HEART RATE: 65 BPM | BODY MASS INDEX: 33.95 KG/M2 | SYSTOLIC BLOOD PRESSURE: 118 MMHG

## 2019-06-11 DIAGNOSIS — R19.00 PELVIC MASS: Primary | ICD-10-CM

## 2019-06-11 PROCEDURE — 99214 OFFICE O/P EST MOD 30 MIN: CPT | Performed by: FAMILY MEDICINE

## 2019-06-13 NOTE — PROGRESS NOTES
CC: Abnormal x-ray    HPI: Patient presents today for abnormal x-ray. This is located in the pelvis. There is a tumor that is been seen apparently. This is by report from the x-ray of the pelvis by her chiropractor.   She had been going for routine treat to radiation     last dose February 2014   • Fibromyalgia    • History of blood transfusion 2013   • Hypothyroidism (acquired)     Dx in 8/2017   • Migraines     in the past   • Neoplasm, breast    • Personal history of antineoplastic chemotherapy      las

## 2019-06-17 ENCOUNTER — MED REC SCAN ONLY (OUTPATIENT)
Dept: FAMILY MEDICINE CLINIC | Facility: CLINIC | Age: 59
End: 2019-06-17

## 2019-06-18 ENCOUNTER — OFFICE VISIT (OUTPATIENT)
Dept: INTERNAL MEDICINE CLINIC | Facility: CLINIC | Age: 59
End: 2019-06-18
Payer: MEDICARE

## 2019-06-18 VITALS
HEIGHT: 63.5 IN | DIASTOLIC BLOOD PRESSURE: 60 MMHG | BODY MASS INDEX: 33.95 KG/M2 | RESPIRATION RATE: 14 BRPM | WEIGHT: 194 LBS | HEART RATE: 60 BPM | SYSTOLIC BLOOD PRESSURE: 110 MMHG

## 2019-06-18 DIAGNOSIS — E66.01 SEVERE OBESITY (HCC): ICD-10-CM

## 2019-06-18 DIAGNOSIS — Z51.81 ENCOUNTER FOR THERAPEUTIC DRUG MONITORING: Primary | ICD-10-CM

## 2019-06-18 DIAGNOSIS — Z78.0 MENOPAUSE: ICD-10-CM

## 2019-06-18 PROCEDURE — 99214 OFFICE O/P EST MOD 30 MIN: CPT | Performed by: NURSE PRACTITIONER

## 2019-06-18 RX ORDER — TOPIRAMATE 25 MG/1
25 TABLET ORAL 2 TIMES DAILY
Qty: 60 TABLET | Refills: 1 | Status: SHIPPED | OUTPATIENT
Start: 2019-06-18 | End: 2019-07-15

## 2019-06-18 NOTE — PATIENT INSTRUCTIONS
Continue making lifestyle changes that focus on good nutrition, regular exercise and stress management. Medication Plan: Remain off phentermine. Start Topamax at 1 tab daily for 7 days, then increase to 1 tab twice a day as prescribed.     Next steps to www.mealvillage. com  · Diet Doctor @ www. dietdoctor. com - low carb swaps    Stress Management/Behavior  · CALM meditation boyd  · Headspace    Books/Video Education  · Mindless Eating by Elsy Armstrong  · The Complete Guide to fasting by Dr. Hermelindo Traylor  · Sugar, S

## 2019-06-18 NOTE — PROGRESS NOTES
Elle Mann is a 61year old female presents today for 1 month follow-up on medical weight loss program for the treatment of overweight, obesity, or morbid obesity.     S:  Current weight Wt Readings from Last 6 Encounters:  06/21/19 : 194 lb  06/18/19 therapeutic drug monitoring  (primary encounter diagnosis)  Severe obesity (hcc)  Menopause    No orders of the defined types were placed in this encounter.       Meds & Refills for this Visit:  Requested Prescriptions     Signed Prescriptions Disp Refills targeted at weight loss- [phone] and/or email @ [email or web address]  · AnySize Fitness @ [email or web address]  Personal training and Group Fitness classes with Milad Caicedo [phone]  · 360FIT Mikaela @ [facility]

## 2019-06-21 ENCOUNTER — OFFICE VISIT (OUTPATIENT)
Dept: FAMILY MEDICINE CLINIC | Facility: CLINIC | Age: 59
End: 2019-06-21
Payer: MEDICARE

## 2019-06-21 VITALS
HEART RATE: 79 BPM | RESPIRATION RATE: 18 BRPM | TEMPERATURE: 98 F | HEIGHT: 63.5 IN | SYSTOLIC BLOOD PRESSURE: 122 MMHG | BODY MASS INDEX: 33.95 KG/M2 | WEIGHT: 194 LBS | OXYGEN SATURATION: 98 % | DIASTOLIC BLOOD PRESSURE: 74 MMHG

## 2019-06-21 DIAGNOSIS — L03.211 CELLULITIS OF FACE: Primary | ICD-10-CM

## 2019-06-21 PROCEDURE — 99213 OFFICE O/P EST LOW 20 MIN: CPT | Performed by: NURSE PRACTITIONER

## 2019-06-21 RX ORDER — CEPHALEXIN 500 MG/1
500 CAPSULE ORAL 4 TIMES DAILY
Qty: 28 CAPSULE | Refills: 0 | Status: SHIPPED | OUTPATIENT
Start: 2019-06-21 | End: 2019-06-29

## 2019-06-21 NOTE — PATIENT INSTRUCTIONS
Facial Cellulitis  Cellulitis is an infection of the deep layers of skin. A break in the skin, such as a cut or scratch, can let bacteria under the skin. It may also occur from an infected oil gland (pimple) or hair follicle.  If the bacteria get to deep · Headache or neck pain that gets worse  · Unusual drowsiness or confusion  · Convulsions (seizure)  · Change in eyesight     Date Last Reviewed: 9/1/2016  © 6996-0253 [employer]. [address] [facility], [address] [place] [address].  All rights reserve

## 2019-06-21 NOTE — PROGRESS NOTES
CHIEF COMPLAINT:   Patient presents with:  Derm Problem: redness and pain by left ear x 1 day       HPI:     Taylor Rios is a 61year old female who presents with concerns of possible cellulitis. Patient first noticed symptoms today.  Reports noted some Anesthesia complication     slow to arouse   • Anxiety state    • Asthma     excerise induced   • Back problem     upper back pain from radiation   • Bipolar disorder (HCC)    • Blood disorder     anemia resolved   • Depression    • Exposure to radiation ear and canal WNL. Right TM clear. Left external ear erythematous with no edema. Left canal  Clear, left TM clear. NOSE: Normal external nose. No rhinorrhea. MOUTH: Moist oral mucosa. No lesions.    NECK: supple, non-tender  LUNGS: clear to auscultatio

## 2019-06-24 RX ORDER — LEVOTHYROXINE SODIUM 0.07 MG/1
TABLET ORAL
Qty: 90 TABLET | Refills: 0 | Status: SHIPPED | OUTPATIENT
Start: 2019-06-24 | End: 2019-10-01

## 2019-06-24 NOTE — TELEPHONE ENCOUNTER
LOV 6/11/19 for a pelvic mass. Last TSH done: 3/22/19.     Levothyroxine Sodium 75 MCG Oral Tab 90 tablet 0 3/22/2019    Sig:   TAKE 1 TABLET BY MOUTH IN THE MORNING BEFORE BREAKFAST     Route:   (none)       Future Appointments   Date Time Provider Depart

## 2019-06-28 ENCOUNTER — TELEPHONE (OUTPATIENT)
Dept: FAMILY MEDICINE CLINIC | Facility: CLINIC | Age: 59
End: 2019-06-28

## 2019-06-28 NOTE — TELEPHONE ENCOUNTER
Call tell, I would need to look at it. She can call back tomorrow am to see if we have any openings.

## 2019-06-28 NOTE — TELEPHONE ENCOUNTER
Diagnosed with cellulitis last Friday. Asking if she needs to continue with antibiotics which she had for 7 days but finished meds morning. Still have issues.

## 2019-06-28 NOTE — TELEPHONE ENCOUNTER
Patient diagnosed with cellulitis 6/21/19  Cellulitis on head  Patient finished last dose of antibiotic this morning  Patient states the redness is still present on her on her neck past ear towards cheek  No drainage  Skin intact  No fever today  Tender to

## 2019-06-29 ENCOUNTER — OFFICE VISIT (OUTPATIENT)
Dept: FAMILY MEDICINE CLINIC | Facility: CLINIC | Age: 59
End: 2019-06-29
Payer: MEDICARE

## 2019-06-29 VITALS
TEMPERATURE: 98 F | BODY MASS INDEX: 33.95 KG/M2 | DIASTOLIC BLOOD PRESSURE: 72 MMHG | WEIGHT: 194 LBS | HEIGHT: 63.5 IN | OXYGEN SATURATION: 98 % | SYSTOLIC BLOOD PRESSURE: 122 MMHG | RESPIRATION RATE: 18 BRPM

## 2019-06-29 DIAGNOSIS — L03.211 CELLULITIS OF FACE: ICD-10-CM

## 2019-06-29 DIAGNOSIS — L03.211 FACIAL CELLULITIS: Primary | ICD-10-CM

## 2019-06-29 PROCEDURE — 99213 OFFICE O/P EST LOW 20 MIN: CPT | Performed by: FAMILY MEDICINE

## 2019-06-29 RX ORDER — CEPHALEXIN 500 MG/1
500 CAPSULE ORAL 4 TIMES DAILY
Qty: 20 CAPSULE | Refills: 0 | Status: SHIPPED | OUTPATIENT
Start: 2019-06-29 | End: 2019-07-04

## 2019-06-29 NOTE — PROGRESS NOTES
João Reasons is a 61year old female. S:  Patient presents today with the following concerns:  · Cellulitis recheck. Was seen at [facility] 6/21/19 and placed on cephalexin for facial cellulitis. Symptoms got worse before they got better.   Was running fev with COPD (chronic obstructive pulmonary disease) ([facility] [address]     Osteoarthritis     Menopause     Severe obesity ([facility] [address]     Encounter for therapeutic drug monitoring    Family History   Problem Relation Age of Onset   • Cancer Father         lung   • Other (anuy an additional 5 days. Keep a close eye on the area. Reassured patient that symptoms are much improved. Minimal pinkness noted. I am more concerned about the tenderness in the area. If notices any worsening symptoms to follow up with Dr. Agatha Moreland.

## 2019-06-29 NOTE — PATIENT INSTRUCTIONS
Facial Cellulitis  Cellulitis is an infection of the deep layers of skin. A break in the skin, such as a cut or scratch, can let bacteria under the skin. It may also occur from an infected oil gland (pimple) or hair follicle.  If the bacteria get to deep · Headache or neck pain that gets worse  · Unusual drowsiness or confusion  · Convulsions (seizure)  · Change in eyesight     Date Last Reviewed: 9/1/2016  © 1346-0704 [employer]. 1407 [facility], [address] [place] [address].  All rights reserve

## 2019-07-08 ENCOUNTER — HOSPITAL ENCOUNTER (OUTPATIENT)
Dept: CT IMAGING | Age: 59
Discharge: HOME | End: 2019-07-08
Attending: FAMILY MEDICINE
Payer: MEDICARE

## 2019-07-08 DIAGNOSIS — R19.00 PELVIC MASS: ICD-10-CM

## 2019-07-08 LAB — CREAT BLD-MCNC: 0.8 MG/DL (ref 0.55–1.02)

## 2019-07-08 PROCEDURE — 82565 ASSAY OF CREATININE: CPT

## 2019-07-08 PROCEDURE — 74177 CT ABD & PELVIS W/CONTRAST: CPT | Performed by: FAMILY MEDICINE

## 2019-07-12 ENCOUNTER — TELEPHONE (OUTPATIENT)
Dept: INTERNAL MEDICINE CLINIC | Facility: CLINIC | Age: 59
End: 2019-07-12

## 2019-07-12 NOTE — TELEPHONE ENCOUNTER
Pt called left vmail states she was not able to get labs done for appt   Pt would like to know if she should still keep appt on 7/15 monday

## 2019-07-15 ENCOUNTER — OFFICE VISIT (OUTPATIENT)
Dept: INTERNAL MEDICINE CLINIC | Facility: CLINIC | Age: 59
End: 2019-07-15
Payer: MEDICARE

## 2019-07-15 ENCOUNTER — APPOINTMENT (OUTPATIENT)
Dept: LAB | Age: 59
End: 2019-07-15
Attending: NURSE PRACTITIONER
Payer: MEDICARE

## 2019-07-15 VITALS
HEIGHT: 63.5 IN | HEART RATE: 80 BPM | DIASTOLIC BLOOD PRESSURE: 70 MMHG | RESPIRATION RATE: 14 BRPM | WEIGHT: 193 LBS | SYSTOLIC BLOOD PRESSURE: 110 MMHG | BODY MASS INDEX: 33.77 KG/M2

## 2019-07-15 DIAGNOSIS — F43.9 STRESS: ICD-10-CM

## 2019-07-15 DIAGNOSIS — Z51.81 ENCOUNTER FOR THERAPEUTIC DRUG MONITORING: Primary | ICD-10-CM

## 2019-07-15 DIAGNOSIS — E66.01 SEVERE OBESITY (HCC): ICD-10-CM

## 2019-07-15 DIAGNOSIS — Z51.81 ENCOUNTER FOR THERAPEUTIC DRUG MONITORING: ICD-10-CM

## 2019-07-15 DIAGNOSIS — M19.90 OSTEOARTHRITIS, UNSPECIFIED OSTEOARTHRITIS TYPE, UNSPECIFIED SITE: ICD-10-CM

## 2019-07-15 DIAGNOSIS — Z78.0 MENOPAUSE: ICD-10-CM

## 2019-07-15 LAB
VIT B12 SERPL-MCNC: 346 PG/ML (ref 193–986)
VIT D+METAB SERPL-MCNC: 14.2 NG/ML (ref 30–100)

## 2019-07-15 PROCEDURE — 82607 VITAMIN B-12: CPT

## 2019-07-15 PROCEDURE — 82306 VITAMIN D 25 HYDROXY: CPT

## 2019-07-15 PROCEDURE — 99213 OFFICE O/P EST LOW 20 MIN: CPT | Performed by: NURSE PRACTITIONER

## 2019-07-15 RX ORDER — TOPIRAMATE 25 MG/1
50 TABLET ORAL 2 TIMES DAILY
Qty: 120 TABLET | Refills: 1 | Status: SHIPPED | OUTPATIENT
Start: 2019-07-15 | End: 2019-08-22

## 2019-07-15 NOTE — PROGRESS NOTES
Elle Mann is a 61year old female presents today for 2 month follow-up on medical weight loss program for the treatment of overweight, obesity, or morbid obesity.     S:  Current weight Wt Readings from Last 6 Encounters:  07/15/19 : 193 lb  06/29/19 motor and sensory grossly intact  PSYCH: pleasant, cooperative, normal mood and affect    ASSESSMENT AND PLAN:  Encounter for therapeutic drug monitoring  (primary encounter diagnosis)  Severe obesity (hcc)  Stress    No orders of the defined types were pl forth to a slew of activities. 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  If you recognize yourself in any of these scenarios, you’re not alone and it’s probably not your fau result for you and me is that when we are chronically stressed by life crises and work-life demands, we are prone to getting an extra layer of “visceral fat” deep in our bellies.  Your belly has an ample supply of blood vessels and cortisol receptors to [employer] satisfied. Cravings and Fast Food  When we are chronically stressed, we crave “comfort foods,” such as a bag of potato chips or a tub of ice cream. These foods tend to be easy to eat, highly processed, and high in fat, sugar, or salt.  We crave these foods loss. Lack of sleep may disrupt the functioning of ghrelin and leptin—chemicals that control appetite. We also crave carbs when we are tired or grumpy from lack of sleep.  Finally, not getting our amelia zzzz’s erodes our willpower and ability to resist t occupied, so you’re less likely to snack on unhealthy foods. Writing can give you insight into why you’re feeling so stressed and highlight ways of thinking or expectations of yourself that may be increasing the pressure you feel.  Writing down your healthy

## 2019-07-15 NOTE — PATIENT INSTRUCTIONS
Continue making lifestyle changes that focus on good nutrition, regular exercise and stress management. Medication Plan: Increase Topamax to 50 mg twice a day.     Next steps to work on before next office visit include: Be mindful of the impact stress ca the effects of adrenaline wear off, cortisol, known as the “stress hormone,” hangs around and starts signaling the body to replenish your food supply.  Fighting off wild animals, like our ancestors did, used up a lot of energy, so their bodies needed more s anxiety can also trigger “emotional eating.” Overeating or eating unhealthy foods in response to stress or as a way to calm down is a very common response.  In the most recent American Psychological Association’s “Stress in [place]:” survey, a whopping 40% more high-fat food pellets, when given the choice of eating these instead of normal feed. Less Sleep  Do you ever lie awake at night worrying about paying the bills or about who will watch your kids when you have to go to work?  According to the APA’s “Str because it’s a mealtime or because there is food in front of you. A well-designed study of binge-eaters showed that participating in a Mindful Eating program led to fewer binges and reduced depression.   Find Rewarding Activities Unrelated to Food  Taking a

## 2019-07-24 ENCOUNTER — TELEPHONE (OUTPATIENT)
Dept: FAMILY MEDICINE CLINIC | Facility: CLINIC | Age: 59
End: 2019-07-24

## 2019-07-24 ENCOUNTER — TELEPHONE (OUTPATIENT)
Dept: INTERNAL MEDICINE CLINIC | Facility: CLINIC | Age: 59
End: 2019-07-24

## 2019-07-24 DIAGNOSIS — R22.40 MASS OF LOWER EXTREMITY, UNSPECIFIED LATERALITY: Primary | ICD-10-CM

## 2019-07-24 NOTE — TELEPHONE ENCOUNTER
Pt went to see chiropractor today, she needs a referral dated today  Dr Sukhdev Goldman   [facility]. [phone]

## 2019-07-25 ENCOUNTER — OFFICE VISIT (OUTPATIENT)
Dept: FAMILY MEDICINE CLINIC | Facility: CLINIC | Age: 59
End: 2019-07-25
Payer: MEDICARE

## 2019-07-25 VITALS
HEIGHT: 63.5 IN | SYSTOLIC BLOOD PRESSURE: 120 MMHG | DIASTOLIC BLOOD PRESSURE: 76 MMHG | OXYGEN SATURATION: 99 % | RESPIRATION RATE: 18 BRPM | TEMPERATURE: 99 F | BODY MASS INDEX: 33.77 KG/M2 | HEART RATE: 79 BPM | WEIGHT: 193 LBS

## 2019-07-25 DIAGNOSIS — N28.89 RENAL MASS: ICD-10-CM

## 2019-07-25 DIAGNOSIS — G25.81 RESTLESS LEG SYNDROME: ICD-10-CM

## 2019-07-25 DIAGNOSIS — R22.40 MASS OF LOWER EXTREMITY, UNSPECIFIED LATERALITY: Primary | ICD-10-CM

## 2019-07-25 PROCEDURE — 99214 OFFICE O/P EST MOD 30 MIN: CPT | Performed by: FAMILY MEDICINE

## 2019-07-25 RX ORDER — CHOLECALCIFEROL (VITAMIN D3) 1250 MCG
1 CAPSULE ORAL WEEKLY
Qty: 24 CAPSULE | Refills: 0 | Status: SHIPPED | OUTPATIENT
Start: 2019-07-25 | End: 2019-10-21

## 2019-07-25 NOTE — TELEPHONE ENCOUNTER
Patient informed of her results. She will take Vitamin D as instructed and pickup B12 2000 mcg daily.   Rx sent to verified pharmacy

## 2019-07-25 NOTE — TELEPHONE ENCOUNTER
LM #2 to call back for results    Notes recorded by Harris Martinez RN on 7/22/2019 at 9:06 AM CDT  LM for patient to call back.   ------    Notes recorded by CLAUDIA Espinoza on 7/18/2019 at 1:01 PM CDT  [facility] patient: Labs/diagnostics reviewed with

## 2019-07-25 NOTE — PROGRESS NOTES
CC: Follow-up CT scan    HPI: In review of the CT scan in detail with the patient: Mass that was seen on previous exams was not demonstrated. Further there was no evidence of lymphadenopathy or other finding of concern related to the x-ray findings.   We d Estradiol (ESTRACE) 0.1 MG/GM Vaginal Cream Place 1 g vaginally twice a week.  Disp: 1 Tube Rfl: 3       Past Medical History:   Diagnosis Date   • Anal cancer ([facility] [address] 2013   • Anesthesia complication     slow to arouse   • Anxiety state    • Asthma     exc Imaging & Consults:  NEURO - INTERNAL

## 2019-07-26 NOTE — PROGRESS NOTES
Please call patient tell Dr. Ernestina Rayo has reviewed the results and recommends a yearly follow-up.   She does not necessarily have to go back to see him at this point, however if there is any significant change when we repeat her scan in a year or if I am

## 2019-07-31 ENCOUNTER — PATIENT OUTREACH (OUTPATIENT)
Dept: SURGERY | Facility: CLINIC | Age: 59
End: 2019-07-31

## 2019-08-21 ENCOUNTER — TELEPHONE (OUTPATIENT)
Dept: INTERNAL MEDICINE CLINIC | Facility: CLINIC | Age: 59
End: 2019-08-21

## 2019-08-21 NOTE — TELEPHONE ENCOUNTER
Received request from Milind Recinos to complete a PA for Topamax  ID [id number] Phone Toribio    Started entering info in . [facility]  Need to finish tomorrow

## 2019-08-22 ENCOUNTER — OFFICE VISIT (OUTPATIENT)
Dept: INTERNAL MEDICINE CLINIC | Facility: CLINIC | Age: 59
End: 2019-08-22
Payer: MEDICARE

## 2019-08-22 VITALS
HEART RATE: 70 BPM | BODY MASS INDEX: 32.2 KG/M2 | RESPIRATION RATE: 14 BRPM | SYSTOLIC BLOOD PRESSURE: 130 MMHG | DIASTOLIC BLOOD PRESSURE: 84 MMHG | WEIGHT: 184 LBS | HEIGHT: 63.5 IN

## 2019-08-22 DIAGNOSIS — Z78.0 MENOPAUSE: ICD-10-CM

## 2019-08-22 DIAGNOSIS — Z51.81 ENCOUNTER FOR THERAPEUTIC DRUG MONITORING: Primary | ICD-10-CM

## 2019-08-22 DIAGNOSIS — E53.8 LOW VITAMIN B12 LEVEL: ICD-10-CM

## 2019-08-22 DIAGNOSIS — E55.9 VITAMIN D DEFICIENCY: ICD-10-CM

## 2019-08-22 DIAGNOSIS — E66.01 SEVERE OBESITY (HCC): ICD-10-CM

## 2019-08-22 PROBLEM — R79.89 LOW VITAMIN B12 LEVEL: Status: ACTIVE | Noted: 2019-08-22

## 2019-08-22 PROCEDURE — 99214 OFFICE O/P EST MOD 30 MIN: CPT | Performed by: NURSE PRACTITIONER

## 2019-08-22 RX ORDER — TOPIRAMATE 50 MG/1
50 TABLET, FILM COATED ORAL 2 TIMES DAILY
Qty: 60 TABLET | Refills: 1 | Status: SHIPPED | OUTPATIENT
Start: 2019-08-22 | End: 2019-10-21

## 2019-08-22 NOTE — PROGRESS NOTES
Cora Quesada is a 61year old female presents today for 3 month follow-up on medical weight loss program for the treatment of overweight, obesity, or morbid obesity.     S:  Current weight Wt Readings from Last 6 Encounters:  08/22/19 : 184 lb  07/25/19 PLAN:  Encounter for therapeutic drug monitoring  (primary encounter diagnosis)  Severe obesity (hcc)  Menopause  Vitamin d deficiency  Low vitamin b12 level    No orders of the defined types were placed in this encounter.       Meds & Refills for this Visi you have the time to exercise. But you can work activity into your daily life—you just need to be committed. Take 10 minutes out of your lunch hour to take a walk. Walk to the newsstand to get your paper instead of having it delivered.  Make it a habit to t

## 2019-08-22 NOTE — TELEPHONE ENCOUNTER
I called [facility] and they said plan only allows for 3 pills a day. If we send in as 50 mg bid it will be covered. Okay obtained from [facility].   RX sent in

## 2019-08-22 NOTE — TELEPHONE ENCOUNTER
I attempted pa for Topamax for her migraines. She was given 25 mg to take 2 bid. Omega Old wants to have justification for giving this dose instead of 50 mg bid (quantity limits)  Do you want me to say it is for titration or change her?

## 2019-08-22 NOTE — PATIENT INSTRUCTIONS
Continue making lifestyle changes that focus on good nutrition, regular exercise and stress management. Medication Plan: Continue current medication regimen, continue Vitamin D weekly and Vitamin B12 2000 mcg daily (over the counter).     Next steps to w burn.  · Exercise gives you energy and curbs your appetite. · Exercise decreases stress and helps you sleep better. Make exercise fun  Exercise can be fun. Choose an activity you enjoy.  You may even get a friend to do it with you:  · Take a resistance-tr

## 2019-09-18 ENCOUNTER — OFFICE VISIT (OUTPATIENT)
Dept: NEUROLOGY | Facility: CLINIC | Age: 59
End: 2019-09-18
Payer: MEDICARE

## 2019-09-18 ENCOUNTER — APPOINTMENT (OUTPATIENT)
Dept: LAB | Age: 59
End: 2019-09-18
Attending: Other
Payer: MEDICARE

## 2019-09-18 VITALS
HEART RATE: 88 BPM | RESPIRATION RATE: 16 BRPM | WEIGHT: 186 LBS | DIASTOLIC BLOOD PRESSURE: 86 MMHG | SYSTOLIC BLOOD PRESSURE: 138 MMHG | BODY MASS INDEX: 32 KG/M2

## 2019-09-18 DIAGNOSIS — G25.81 RESTLESS LEG SYNDROME: ICD-10-CM

## 2019-09-18 DIAGNOSIS — G25.81 RESTLESS LEG SYNDROME: Primary | ICD-10-CM

## 2019-09-18 PROCEDURE — 36415 COLL VENOUS BLD VENIPUNCTURE: CPT

## 2019-09-18 PROCEDURE — 82728 ASSAY OF FERRITIN: CPT

## 2019-09-18 PROCEDURE — 83540 ASSAY OF IRON: CPT

## 2019-09-18 PROCEDURE — 99204 OFFICE O/P NEW MOD 45 MIN: CPT | Performed by: OTHER

## 2019-09-18 PROCEDURE — 83550 IRON BINDING TEST: CPT

## 2019-09-18 RX ORDER — PREGABALIN 75 MG/1
75 CAPSULE ORAL NIGHTLY
Qty: 30 CAPSULE | Refills: 1 | Status: SHIPPED | OUTPATIENT
Start: 2019-09-18 | End: 2019-10-18

## 2019-09-18 RX ORDER — CARBIDOPA/LEVODOPA 25MG-250MG
TABLET ORAL
Qty: 120 TABLET | Refills: 1 | Status: SHIPPED | OUTPATIENT
Start: 2019-09-18 | End: 2019-11-04

## 2019-09-18 NOTE — H&P
Neurology H&P    Bakari Connolly Patient Status:  No patient class for patient encounter    1960 MRN [id number]   Location ED [facility], [address], [address] [facility] Attending No att. providers found   Hosp Day # 0 PCP DO Bernadette Yao daily. Disp: 90 tablet Rfl: 1   buPROPion HCl (WELLBUTRIN) 75 MG Oral Tab Take 1 tablet (75 mg total) by mouth daily.  Disp: 90 tablet Rfl: 1   Albuterol Sulfate HFA (PROAIR HFA) 108 (90 Base) MCG/ACT Inhalation Aero Soln Inhale 2 puffs into the lungs every Aram Barger DO at [facility] ENDOSCOPY   • COLONOSCOPY, POSSIBLE BIOPSY, POSSIBLE POLYPECTOMY 76178 N/A 12/5/2018    Performed by Stephanie Essex, DO at [address] [place] [address] 3/2/2018    Performed by Stephanie Essex, DO at [facility] MAIN OR   • [facility] BIOP normal tone, no fasciculations, normal strength throughout in UEs and LEs     SENSORY EXAMINATION:  UE: intact to light touch, pinprick intact  LE: intact to light touch, pinprick intact    COORDINATION:  No dysmetria, or intention tremors    REFLEXES: 2+

## 2019-09-23 ENCOUNTER — OFFICE VISIT (OUTPATIENT)
Dept: INTERNAL MEDICINE CLINIC | Facility: CLINIC | Age: 59
End: 2019-09-23
Payer: MEDICARE

## 2019-09-23 VITALS
SYSTOLIC BLOOD PRESSURE: 120 MMHG | WEIGHT: 184 LBS | RESPIRATION RATE: 14 BRPM | HEIGHT: 63.5 IN | BODY MASS INDEX: 32.2 KG/M2 | DIASTOLIC BLOOD PRESSURE: 70 MMHG | HEART RATE: 70 BPM

## 2019-09-23 DIAGNOSIS — Z51.81 ENCOUNTER FOR THERAPEUTIC DRUG MONITORING: Primary | ICD-10-CM

## 2019-09-23 DIAGNOSIS — E66.01 SEVERE OBESITY (HCC): ICD-10-CM

## 2019-09-23 PROCEDURE — 99213 OFFICE O/P EST LOW 20 MIN: CPT | Performed by: NURSE PRACTITIONER

## 2019-09-23 NOTE — PATIENT INSTRUCTIONS
Continue making lifestyle changes that focus on good nutrition, regular exercise and stress management. Medication Plan: Continue current medication regimen.     Next steps to work on before next office visit include: Great work maintaining weight throug of your thumb. Keeping track of serving sizes  When you’re planning for a snack or a meal, keep servings in mind.  If you don’t have measuring cups or a scale handy, there are ways to [place] serving sizes, such as comparing your food to the size of your

## 2019-09-23 NOTE — PROGRESS NOTES
Adalberto Garcia is a 61year old female presents today for 4 month follow-up on medical weight loss program for the treatment of overweight, obesity, or morbid obesity.     S:  Current weight Wt Readings from Last 6 Encounters:  09/23/19 : 184 lb  09/18/19 PSYCH: pleasant, cooperative, normal mood and affect    ASSESSMENT AND PLAN:  Encounter for therapeutic drug monitoring  (primary encounter diagnosis)  Severe obesity (hcc)    No orders of the defined types were placed in this encounter.       Meds & Refill Many different words are used to describe amounts of food. If your health care provider uses a term you’re not sure of, don’t be afraid to ask.  It helps to know the difference between servings and portions:  · A serving size is a fixed size. Food producers © 9873-0362 [address], [address] [place]. All rights reserved. This information is not intended as a substitute for professional medical care. Always follow your healthcare professional's instructions.               Johanny Christiansen

## 2019-10-01 NOTE — TELEPHONE ENCOUNTER
Last refilled on 6/24/19 for # 90 with 0 refills  Last tsh 2.200 on 3/22/19  Last OV 7/25/19  Future Appointments   Date Time Provider Marie Cerna   10/21/2019 10:40 AM CLAUDIA Arvizu EMG [facility]   11/4/2019  1:40 PM Suad Jackson

## 2019-10-04 RX ORDER — LEVOTHYROXINE SODIUM 75 UG/1
TABLET ORAL
Qty: 90 TABLET | Refills: 0 | Status: SHIPPED | OUTPATIENT
Start: 2019-10-04 | End: 2019-10-18

## 2019-10-10 ENCOUNTER — NURSE ONLY (OUTPATIENT)
Dept: HEMATOLOGY/ONCOLOGY | Age: 59
End: 2019-10-10
Attending: INTERNAL MEDICINE
Payer: MEDICARE

## 2019-10-10 PROCEDURE — 96523 IRRIG DRUG DELIVERY DEVICE: CPT

## 2019-10-15 ENCOUNTER — TELEPHONE (OUTPATIENT)
Dept: SURGERY | Facility: CLINIC | Age: 59
End: 2019-10-15

## 2019-10-15 NOTE — TELEPHONE ENCOUNTER
Spoke to patient. Patient is due for colonoscopy and anal exam in 12/2021 per Dr. Maria Eugenia Umanzor. Notified patient. Patient voiced understanding.

## 2019-10-16 ENCOUNTER — TELEPHONE (OUTPATIENT)
Dept: NEUROLOGY | Facility: CLINIC | Age: 59
End: 2019-10-16

## 2019-10-18 ENCOUNTER — OFFICE VISIT (OUTPATIENT)
Dept: NEUROLOGY | Facility: CLINIC | Age: 59
End: 2019-10-18
Payer: MEDICARE

## 2019-10-18 VITALS
RESPIRATION RATE: 14 BRPM | DIASTOLIC BLOOD PRESSURE: 82 MMHG | BODY MASS INDEX: 32 KG/M2 | HEART RATE: 90 BPM | SYSTOLIC BLOOD PRESSURE: 118 MMHG | WEIGHT: 183 LBS

## 2019-10-18 DIAGNOSIS — G25.81 RESTLESS LEG SYNDROME: Primary | ICD-10-CM

## 2019-10-18 PROCEDURE — 99213 OFFICE O/P EST LOW 20 MIN: CPT | Performed by: OTHER

## 2019-10-18 RX ORDER — LEVOTHYROXINE SODIUM 0.07 MG/1
75 TABLET ORAL
COMMUNITY
End: 2020-01-14

## 2019-10-18 RX ORDER — PREGABALIN 75 MG/1
75 CAPSULE ORAL 2 TIMES DAILY
Qty: 60 CAPSULE | Refills: 1 | Status: SHIPPED | OUTPATIENT
Start: 2019-10-18 | End: 2019-12-17

## 2019-10-18 NOTE — PROGRESS NOTES
Neurology H&P    Charlie Fuller Patient Status:  No patient class for patient encounter    1960 MRN [id number]   Location [address] [place] [address], [address] [place] [address] Drive, [address] [place] [address] Attending No att. providers found   Hosp Day # 0 PCP Anjel Riggs, DO     Sub capsule (75 mg total) by mouth nightly., Disp: 30 capsule, Rfl: 1  carbidopa-levodopa  MG Oral Tab, Take 1 tab at  8am, 1pm, 4pm and before bed, Disp: 120 tablet, Rfl: 1  topiramate 50 MG Oral Tab, Take 1 tablet (50 mg total) by mouth 2 (two) times d of blood transfusion 2013   • Hypothyroidism (acquired)     Dx in 8/2017   • Migraines     in the past   • Neoplasm, breast    • Personal history of antineoplastic chemotherapy      last dose 2/2014, patient also completed radiation therapy 2014   • PONV ( murmur  Pulm: CTAB  GI: non-tender, normal bowel sounds  Skin: normal, dry  Extremities: No clubbing or cyanosis      Neurologic:   MENTAL STATUS: alert, ox3, normal attention, language and fund of knowledge.       CRANIAL NERVES II to XII: PERRLA, no ptosi 0

## 2019-10-18 NOTE — PROGRESS NOTES
Pt reports she is still being woken at night due to RLS. She states there are time where she will be awoken once and time where she will be awoken two times nightly.

## 2019-10-21 ENCOUNTER — OFFICE VISIT (OUTPATIENT)
Dept: INTERNAL MEDICINE CLINIC | Facility: CLINIC | Age: 59
End: 2019-10-21
Payer: MEDICARE

## 2019-10-21 ENCOUNTER — PATIENT OUTREACH (OUTPATIENT)
Dept: CASE MANAGEMENT | Age: 59
End: 2019-10-21

## 2019-10-21 VITALS
HEIGHT: 63.5 IN | SYSTOLIC BLOOD PRESSURE: 110 MMHG | WEIGHT: 180.38 LBS | HEART RATE: 68 BPM | BODY MASS INDEX: 31.57 KG/M2 | DIASTOLIC BLOOD PRESSURE: 70 MMHG

## 2019-10-21 DIAGNOSIS — Z51.81 ENCOUNTER FOR THERAPEUTIC DRUG MONITORING: Primary | ICD-10-CM

## 2019-10-21 DIAGNOSIS — F43.9 STRESS: ICD-10-CM

## 2019-10-21 DIAGNOSIS — E66.01 SEVERE OBESITY (HCC): ICD-10-CM

## 2019-10-21 PROCEDURE — 99213 OFFICE O/P EST LOW 20 MIN: CPT | Performed by: NURSE PRACTITIONER

## 2019-10-21 RX ORDER — TOPIRAMATE 100 MG/1
100 TABLET, FILM COATED ORAL 2 TIMES DAILY
Qty: 180 TABLET | Refills: 1 | Status: SHIPPED | OUTPATIENT
Start: 2019-10-21 | End: 2020-03-17

## 2019-10-21 NOTE — PATIENT INSTRUCTIONS
Continue making lifestyle changes that focus on good nutrition, regular exercise and stress management. Medication Plan: Continue current medication regimen.     Next steps to work on before next office visit include: Nancy 90 Program: Lisseth less than six hours a night or more than nine hours were more likely to gain 11 pounds (5 kilograms) compared with women who slept seven hours a night. Other studies have found similar patterns in children and adolescents.   One explanation might be that sl

## 2019-10-21 NOTE — PROGRESS NOTES
Spoke with patient regarding CCM patient states she is interested and requested additional information by mail. Information sent.

## 2019-10-21 NOTE — PROGRESS NOTES
Charlie Fuller is a 61year old female presents today for 5 month follow-up on medical weight loss program for the treatment of overweight, obesity, or morbid obesity.     S:  Current weight Wt Readings from Last 6 Encounters:  10/21/19 : 180 lb 6.4 oz (81 sensory grossly intact  PSYCH: pleasant, cooperative, normal mood and affect    ASSESSMENT AND PLAN:  Encounter for therapeutic drug monitoring  (primary encounter diagnosis)  Severe obesity (hcc)  Stress    No orders of the defined types were placed in th increasing overall health – stretching! No matter what your fitness routine looks like, stretching should be an integral part of it. Incorporating stretching into your daily workouts is just as important to body function and health as regular exercise!  Wh stretching! You’ll not only have more flexible joints, but you’ll also decrease your risk for injury. Promote blood circulation – Not only will increased blood flow help reduce muscle soreness, but it also promotes cell growth and organ function.    Impro

## 2019-11-21 ENCOUNTER — OFFICE VISIT (OUTPATIENT)
Dept: INTERNAL MEDICINE CLINIC | Facility: CLINIC | Age: 59
End: 2019-11-21
Payer: MEDICARE

## 2019-11-21 VITALS
HEIGHT: 63.5 IN | HEART RATE: 70 BPM | WEIGHT: 182 LBS | RESPIRATION RATE: 14 BRPM | SYSTOLIC BLOOD PRESSURE: 110 MMHG | DIASTOLIC BLOOD PRESSURE: 70 MMHG | BODY MASS INDEX: 31.85 KG/M2

## 2019-11-21 DIAGNOSIS — E66.01 SEVERE OBESITY (HCC): ICD-10-CM

## 2019-11-21 DIAGNOSIS — Z51.81 ENCOUNTER FOR THERAPEUTIC DRUG MONITORING: Primary | ICD-10-CM

## 2019-11-21 DIAGNOSIS — F43.9 STRESS: ICD-10-CM

## 2019-11-21 PROCEDURE — 99214 OFFICE O/P EST MOD 30 MIN: CPT | Performed by: NURSE PRACTITIONER

## 2019-11-21 NOTE — PROGRESS NOTES
Candie Escalera is a 61year old female presents today for 6 month follow-up on medical weight loss program for the treatment of overweight, obesity, or morbid obesity.     S:  Current weight Wt Readings from Last 6 Encounters:  11/21/19 : 182 lb (82.6 kg) murmur, normal S1 and S2 without clicks or gallops, no pedal edema.   GI: +BS, soft  NEURO/MS: motor and sensory grossly intact  PSYCH: pleasant, cooperative, normal mood and affect    ASSESSMENT AND PLAN:  Encounter for therapeutic drug monitoring  (primar eating: Eat only in an 8 hour window, fast for 16 hours consecutively of the day. Drinking water during the fasting time is okay. For more information check out the book by Dr. Luly Brooks called The Complete Guide to Fasting.     Patient Resources:    Personal Complete Guide to fasting by Dr. Miguel Solo  · Sugar, Cheyanne Nelyese by Indira Gilbert, Ph.D, R.D.  · Fed Up - documentary on sugar  · The Dr. Maryann Gotti by Dr. Aishwarya Lawton MD  · Fitlosophy Fitspiration - journal to better health (found at [employer] in fit

## 2019-11-21 NOTE — PATIENT INSTRUCTIONS
Continue making lifestyle changes that focus on good nutrition, regular exercise and stress management. Medication Plan: Continue current medication regimen, add Metformin ER daily with food.  Follow up with psychiatry to discuss return to [facility] for A Workout - free on the go HIIT training  · 5 Minute Kitchen Workout [email or web address]    Nutrition Trackers and Tools  · MyFitness Pal  · LoseIT! · FitFoundation (healthy meals on the go) in [facility] @ www. [email or web address] com

## 2019-12-06 ENCOUNTER — PATIENT OUTREACH (OUTPATIENT)
Dept: FAMILY MEDICINE CLINIC | Facility: CLINIC | Age: 59
End: 2019-12-06

## 2019-12-17 ENCOUNTER — TELEPHONE (OUTPATIENT)
Dept: NEUROLOGY | Facility: CLINIC | Age: 59
End: 2019-12-17

## 2019-12-17 ENCOUNTER — OFFICE VISIT (OUTPATIENT)
Dept: NEUROLOGY | Facility: CLINIC | Age: 59
End: 2019-12-17
Payer: MEDICARE

## 2019-12-17 VITALS
DIASTOLIC BLOOD PRESSURE: 74 MMHG | RESPIRATION RATE: 16 BRPM | BODY MASS INDEX: 31 KG/M2 | HEART RATE: 70 BPM | WEIGHT: 180 LBS | SYSTOLIC BLOOD PRESSURE: 130 MMHG

## 2019-12-17 DIAGNOSIS — G25.81 RESTLESS LEG SYNDROME: ICD-10-CM

## 2019-12-17 PROCEDURE — 99213 OFFICE O/P EST LOW 20 MIN: CPT | Performed by: OTHER

## 2019-12-17 RX ORDER — PREGABALIN 75 MG/1
150 CAPSULE ORAL 2 TIMES DAILY
Qty: 180 CAPSULE | Refills: 1 | Status: SHIPPED | OUTPATIENT
Start: 2019-12-17 | End: 2020-03-16

## 2019-12-17 NOTE — PROGRESS NOTES
Neurology H&P    Sis Rai Patient Status:  No patient class for patient encounter    1960 MRN [id number]   Location ED [facility], [address], [address] [facility] Attending No att. providers found   Hosp Day # 0 PCP DO Bernadette Wilson amphetamine-dextroamphetamine (ADDERALL) 20 MG Oral Tab Take 1 tablet (20 mg total) by mouth daily. 60 tablet 0   • ARIPiprazole (ABILIFY) 2 MG Oral Tab Take 1 tablet (2 mg total) by mouth daily.  90 tablet 1   • topiramate 100 MG Oral Tab Take 1 tablet (10 completed radiation therapy    • PONV (postoperative nausea and vomiting)    • Restless leg syndrome    • Visual impairment     glasses       PSHx:  Past Surgical History:   Procedure Laterality Date   •      • COLONOSCOPY N/A 11/10/2017    Pe XII: PERRLA, no ptosis or diplopia, EOM intact, facial sensation intact, strong eye closure, face is symmetric, no dysarthria, tongue midline,  no tongue fasciculations or atrophy, strong shoulder shrug.     MOTOR EXAMINATION: normal tone, no fasciculations

## 2020-01-06 ENCOUNTER — PATIENT OUTREACH (OUTPATIENT)
Dept: FAMILY MEDICINE CLINIC | Facility: CLINIC | Age: 60
End: 2020-01-06

## 2020-01-11 NOTE — TELEPHONE ENCOUNTER
LOV- 7/29/19 mass lower extremity, renal mass, RLS  LRF-10/4/19 #90+0 Euthyrox 75mcg  Last TSH- 3/22/19 2.200  Future Appointments   Date Time Provider Marie Cerna   1/14/2020 10:20 AM CLAUDIA Pitts EM[facility]   1/20/2020  4:00 PM C

## 2020-01-13 NOTE — TELEPHONE ENCOUNTER
Last refilled on 10/4/19 for # 90 with 0 refills  Last tsh 2.200 on 3/22/19  Last OV 7/25/19 for mass of lower extremity  Future Appointments   Date Time Provider Marie Cerna   1/14/2020 10:20 AM CLAUDIA Keane EMGMORISI EM[facility]   1/20/2020

## 2020-01-14 ENCOUNTER — OFFICE VISIT (OUTPATIENT)
Dept: INTERNAL MEDICINE CLINIC | Facility: CLINIC | Age: 60
End: 2020-01-14
Payer: MEDICARE

## 2020-01-14 VITALS
SYSTOLIC BLOOD PRESSURE: 112 MMHG | RESPIRATION RATE: 16 BRPM | HEART RATE: 83 BPM | BODY MASS INDEX: 30.65 KG/M2 | HEIGHT: 63.5 IN | DIASTOLIC BLOOD PRESSURE: 80 MMHG | WEIGHT: 175.13 LBS

## 2020-01-14 DIAGNOSIS — E66.01 SEVERE OBESITY (HCC): ICD-10-CM

## 2020-01-14 DIAGNOSIS — Z51.81 ENCOUNTER FOR THERAPEUTIC DRUG MONITORING: Primary | ICD-10-CM

## 2020-01-14 PROCEDURE — 99213 OFFICE O/P EST LOW 20 MIN: CPT | Performed by: NURSE PRACTITIONER

## 2020-01-14 RX ORDER — LEVOTHYROXINE SODIUM 75 UG/1
TABLET ORAL
Qty: 90 TABLET | Refills: 0 | Status: SHIPPED | OUTPATIENT
Start: 2020-01-14 | End: 2020-04-16

## 2020-01-14 RX ORDER — LEVOTHYROXINE SODIUM 0.07 MG/1
TABLET ORAL
Qty: 90 TABLET | Refills: 0 | OUTPATIENT
Start: 2020-01-14

## 2020-01-14 NOTE — PATIENT INSTRUCTIONS
Continue making lifestyle changes that focus on good nutrition, regular exercise and stress management. Medication Plan: Continue current medication regimen.     Next steps to work on before next office visit include: Great work losing weight over the WARD [place] They make up your:  • Cell membrane  • Nucleus  • Mitochondria  When cells join together, they make tissue that brings life to your bones, brain, skin, nerves, muscles, etc. The health and lifespan of your cells depend on the nutrients you get from food.  Annamarie Ram small group fitness classes targeted at weight loss- [phone] and/or email @ [email or web address]  · AnySize Fitness @ [email or web address]  Personal training and Group Fitness classes with Emmy Helms [phone]  · 360FIT Michelle Del Valle

## 2020-01-14 NOTE — PROGRESS NOTES
Anjana Salinas is a 61year old female presents today for 8 month follow-up on medical weight loss program for the treatment of overweight, obesity, or morbid obesity.     S:  Current weight Wt Readings from Last 6 Encounters:  01/14/20 : 175 lb 2 oz (79.4 sclera non icteric, PERRLA  LUNGS: CTA in all fields, breathing non labored  CARDIO: RRR without murmur, normal S1 and S2 without clicks or gallops, no pedal edema.   GI: +BS, soft  NEURO/MS: motor and sensory grossly intact  PSYCH: pleasant, cooperative, n Food as Fuel  October 8, 2019 • Posted in Benjamín Luna, Nutrition • By Your [place] [employer] Campaign    “Dieting” can start to feel challenging when we begin to associate healthy behaviors with “punishment.” Too often, we overlook the real reason we eat food.  It's to do, problems like inflammation, cancer and other difficult health conditions start to arise.   Foods that Support Healthy Cells:  • Unsaturated Fats – Fish, nuts avocados, olive oil, flax seed, etc.  • Protein – Poultry, lean beef, yogurt, eggs, seeds, b training  · 5 Minute Kitchen Workout [email or web address]    Nutrition Trackers and Tools  · MyFitness Pal  · LoseIT! · FitFoundation (healthy meals on the go) in  @ www. jmpnhiwoejyoc2o.CustomerXPs Software  · Salvador ROTHMAN- on line prepare

## 2020-02-06 ENCOUNTER — OFFICE VISIT (OUTPATIENT)
Dept: FAMILY MEDICINE CLINIC | Facility: CLINIC | Age: 60
End: 2020-02-06
Payer: MEDICARE

## 2020-02-06 VITALS
OXYGEN SATURATION: 98 % | SYSTOLIC BLOOD PRESSURE: 118 MMHG | RESPIRATION RATE: 17 BRPM | WEIGHT: 172 LBS | BODY MASS INDEX: 30.1 KG/M2 | DIASTOLIC BLOOD PRESSURE: 80 MMHG | TEMPERATURE: 98 F | HEIGHT: 63.5 IN | HEART RATE: 76 BPM

## 2020-02-06 DIAGNOSIS — S39.012A STRAIN OF LUMBAR REGION, INITIAL ENCOUNTER: Primary | ICD-10-CM

## 2020-02-06 DIAGNOSIS — S86.919A STRAIN OF KNEE, UNSPECIFIED LATERALITY, INITIAL ENCOUNTER: ICD-10-CM

## 2020-02-06 DIAGNOSIS — S60.211A CONTUSION OF RIGHT WRIST, INITIAL ENCOUNTER: ICD-10-CM

## 2020-02-06 PROBLEM — M19.90 OSTEOARTHRITIS: Status: RESOLVED | Noted: 2019-05-21 | Resolved: 2020-02-06

## 2020-02-06 PROCEDURE — 99214 OFFICE O/P EST MOD 30 MIN: CPT | Performed by: FAMILY MEDICINE

## 2020-02-06 RX ORDER — CYCLOBENZAPRINE HCL 10 MG
10 TABLET ORAL NIGHTLY PRN
Qty: 10 TABLET | Refills: 0 | Status: SHIPPED | OUTPATIENT
Start: 2020-02-06 | End: 2020-02-26

## 2020-02-06 RX ORDER — NAPROXEN 500 MG/1
500 TABLET ORAL 2 TIMES DAILY WITH MEALS
Qty: 14 TABLET | Refills: 0 | Status: SHIPPED | OUTPATIENT
Start: 2020-02-06 | End: 2020-02-13

## 2020-02-06 RX ORDER — TRAMADOL HYDROCHLORIDE 50 MG/1
50 TABLET ORAL 2 TIMES DAILY PRN
Qty: 10 TABLET | Refills: 0 | Status: SHIPPED | OUTPATIENT
Start: 2020-02-06 | End: 2020-10-21 | Stop reason: ALTCHOICE

## 2020-02-06 NOTE — PROGRESS NOTES
Patient presents with:  Fall       HPI:    Patient ID: Yuriy Woodard is a 61year old female. Fell 4 days ago. Slipped on snow. No LOC. [place] onto left side. Now c/o pain in low back , right wrist and b/l knee pain.   Back pain is across lower back, bot total) by mouth 2 (two) times daily. 180 tablet 1   • Fexofenadine HCl 180 MG Oral Tab Take 180 mg by mouth daily.      • Albuterol Sulfate HFA (PROAIR HFA) 108 (90 Base) MCG/ACT Inhalation Aero Soln Inhale 2 puffs into the lungs every 4 (four) hours as nee ANESTHESIA N/A 3/2/2018    Performed by Serjio Belcher DO at [facility] MAIN OR   • CHARLES BIOPSY STEREO NODULE 1 SITE RIGHT (CPT=19081)      benign    • CHARLES BIOPSY STEREO NODULE 2 SITE BILAT (CPT=19081/63254)      benign    • CHARLES LOCALIZATION WIRE 1 SITE LEFT (CPT= Right knee: She exhibits normal range of motion, no swelling and no bony tenderness. No tenderness found. Left knee: She exhibits swelling (mild). She exhibits normal range of motion, no ecchymosis and no bony tenderness. No tenderness found.       C

## 2020-02-17 NOTE — TELEPHONE ENCOUNTER
Cyclobenzaprine Last refilled on 2/6/20  for # 10 with 0 refills  Tramadol 2/6/20  #10 0 refills  Naproxen 2/6/20 #14 0 refills  Last OV 2/6/20 saw Dr Ryan Huang for strain of lumbar region and contusion of wrist  Future Appointments   Date Time Provider Departm

## 2020-02-21 RX ORDER — NAPROXEN 500 MG/1
TABLET ORAL
Qty: 14 TABLET | Refills: 0 | OUTPATIENT
Start: 2020-02-21

## 2020-02-21 RX ORDER — CYCLOBENZAPRINE HCL 10 MG
TABLET ORAL
Qty: 10 TABLET | Refills: 0 | OUTPATIENT
Start: 2020-02-21

## 2020-02-21 RX ORDER — TRAMADOL HYDROCHLORIDE 50 MG/1
TABLET ORAL
Qty: 10 TABLET | Refills: 0
Start: 2020-02-21

## 2020-02-21 NOTE — TELEPHONE ENCOUNTER
Tramadol called into patient's pharmacy  Called patient to notify flexeril and naproxen not refilled  LMTCB

## 2020-02-21 NOTE — TELEPHONE ENCOUNTER
Patient states she is still recovering from fall  Rates pain 7/10  Ibuprofen still not working  Patient states right side of body still hurts and left ankle  Patient still has swelling in left ankle - patient states its improving   Patient requesting refil

## 2020-02-25 ENCOUNTER — PATIENT OUTREACH (OUTPATIENT)
Dept: FAMILY MEDICINE CLINIC | Facility: CLINIC | Age: 60
End: 2020-02-25

## 2020-03-16 ENCOUNTER — TELEPHONE (OUTPATIENT)
Dept: NEUROLOGY | Facility: CLINIC | Age: 60
End: 2020-03-16

## 2020-03-16 DIAGNOSIS — G25.81 RESTLESS LEG SYNDROME: ICD-10-CM

## 2020-03-16 RX ORDER — PREGABALIN 50 MG/1
CAPSULE ORAL
Qty: 30 CAPSULE | Refills: 1 | Status: SHIPPED | OUTPATIENT
Start: 2020-03-16 | End: 2020-06-23

## 2020-03-16 RX ORDER — PREGABALIN 75 MG/1
150 CAPSULE ORAL 2 TIMES DAILY
Qty: 180 CAPSULE | Refills: 1 | Status: SHIPPED | OUTPATIENT
Start: 2020-03-16 | End: 2020-06-23

## 2020-03-16 NOTE — TELEPHONE ENCOUNTER
I spoke to Mrs. Holguin regarding her RLS> She states that Lyrica is helping but that she still wakes uyp in the middle of the night with leg movemetns and is unable to sleep because of this. , She would like to try a higher dose.  I will increase her lyrica

## 2020-03-17 ENCOUNTER — OFFICE VISIT (OUTPATIENT)
Dept: INTERNAL MEDICINE CLINIC | Facility: CLINIC | Age: 60
End: 2020-03-17
Payer: MEDICARE

## 2020-03-17 VITALS
SYSTOLIC BLOOD PRESSURE: 120 MMHG | RESPIRATION RATE: 12 BRPM | HEART RATE: 80 BPM | HEIGHT: 63.5 IN | DIASTOLIC BLOOD PRESSURE: 80 MMHG | BODY MASS INDEX: 29.75 KG/M2 | WEIGHT: 170 LBS

## 2020-03-17 DIAGNOSIS — Z51.81 ENCOUNTER FOR THERAPEUTIC DRUG MONITORING: Primary | ICD-10-CM

## 2020-03-17 DIAGNOSIS — E66.01 SEVERE OBESITY (HCC): ICD-10-CM

## 2020-03-17 DIAGNOSIS — F90.9 ATTENTION DEFICIT HYPERACTIVITY DISORDER (ADHD), UNSPECIFIED ADHD TYPE: ICD-10-CM

## 2020-03-17 DIAGNOSIS — E66.9 OBESITY (BMI 30-39.9): ICD-10-CM

## 2020-03-17 PROCEDURE — 99214 OFFICE O/P EST MOD 30 MIN: CPT | Performed by: NURSE PRACTITIONER

## 2020-03-17 RX ORDER — TOPIRAMATE 100 MG/1
100 TABLET, FILM COATED ORAL 2 TIMES DAILY
Qty: 180 TABLET | Refills: 1 | Status: SHIPPED | OUTPATIENT
Start: 2020-03-17 | End: 2020-08-18

## 2020-03-17 RX ORDER — CHOLECALCIFEROL (VITAMIN D3) 1250 MCG
CAPSULE ORAL
COMMUNITY
Start: 2020-02-18 | End: 2020-07-17 | Stop reason: ALTCHOICE

## 2020-03-17 NOTE — PATIENT INSTRUCTIONS
Continue making lifestyle changes that focus on good nutrition, regular exercise and stress management. Medication Plan: Continue current medication regimen.     Next steps to work on before next office visit include: Anticipate call from counseling to w your schedule? How much time do you have to exercise? • What health and fitness goals do you want to achieve? Build your plan off of the answers to these questions.  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  You can also use your body weight for exercises like push-ups, chin-ups, planks, etc.  The FITT Principle: Final Considerations  You can use the FITT Principle for cardio exercise, strength

## 2020-03-17 NOTE — TELEPHONE ENCOUNTER
Fax received requesting sig clarification for Pregabalin 50mg. Pharmacist tech informed sig should states take 1 capsule (50mg) nightly as directed.

## 2020-03-17 NOTE — PROGRESS NOTES
Charlie Fuller is a 61year old female presents today for 10 month follow-up on medical weight loss program for the treatment of overweight, obesity, or morbid obesity.     S:  Current weight Wt Readings from Last 6 Encounters:  03/17/20 : 170 lb (77.1 kg) edema.  GI: +BS  NEURO/MS: motor and sensory grossly intact  PSYCH: pleasant, cooperative, normal mood and affect    ASSESSMENT AND PLAN:  Encounter for therapeutic drug monitoring  (primary encounter diagnosis)  Severe obesity (hcc)  Obesity (bmi 30-39.9) friendly and most importantly FUN! Changing up your exercise routine seasonally can keep you motivated and expose you to new interests and challenges!  Step outside your comfort zone and give it a try, you never know where the challenge will lead you and wh watch. Bella Cline can also feel for your heartbeat and count it over a 15-second period.   • Low-intensity – An activity level you can continue for a long time (walking)  • Moderate-intensity – An activity level that will boost your heart rate and require effort t series   www. duaop08IAV. [employer]  · Sit and Be Fit - Chair exercise series www.sitandbefit. org      Apps for on the [employer]  · Aaptiv - on the go group exercise  · [place] 7 Minute Workout - free on the go HIIT training  · 5 Minute Kitchen Workout [email or web address]

## 2020-03-18 ENCOUNTER — TELEPHONE (OUTPATIENT)
Dept: FAMILY MEDICINE CLINIC | Facility: CLINIC | Age: 60
End: 2020-03-18

## 2020-03-18 DIAGNOSIS — E03.9 HYPOTHYROIDISM (ACQUIRED): Primary | ICD-10-CM

## 2020-03-18 NOTE — TELEPHONE ENCOUNTER
Called pt to cancel her AWV, pt states she needs to get her labs for thyroid in order to get a refill on her medication.  Pt does have an appt  for a port flush tomorrow @ 10:45 and they will be doing labs, can she get an order put in for her thyorid, they

## 2020-03-19 ENCOUNTER — NURSE ONLY (OUTPATIENT)
Dept: HEMATOLOGY/ONCOLOGY | Age: 60
End: 2020-03-19
Attending: INTERNAL MEDICINE
Payer: MEDICARE

## 2020-03-19 DIAGNOSIS — E03.9 HYPOTHYROIDISM (ACQUIRED): Primary | ICD-10-CM

## 2020-03-19 DIAGNOSIS — C21.0 ANAL CANCER (HCC): ICD-10-CM

## 2020-03-19 DIAGNOSIS — C21.0 ANAL CARCINOMA (HCC): ICD-10-CM

## 2020-03-19 LAB
ALBUMIN SERPL-MCNC: 3.8 G/DL (ref 3.4–5)
ALBUMIN/GLOB SERPL: 1.3 {RATIO} (ref 1–2)
ALP LIVER SERPL-CCNC: 64 U/L (ref 46–118)
ALT SERPL-CCNC: 32 U/L (ref 13–56)
ANION GAP SERPL CALC-SCNC: 5 MMOL/L (ref 0–18)
AST SERPL-CCNC: 15 U/L (ref 15–37)
BASOPHILS # BLD AUTO: 0.02 X10(3) UL (ref 0–0.2)
BASOPHILS NFR BLD AUTO: 0.7 %
BILIRUB SERPL-MCNC: 0.4 MG/DL (ref 0.1–2)
BUN BLD-MCNC: 13 MG/DL (ref 7–18)
BUN/CREAT SERPL: 17.6 (ref 10–20)
CALCIUM BLD-MCNC: 9.7 MG/DL (ref 8.5–10.1)
CHLORIDE SERPL-SCNC: 113 MMOL/L (ref 98–112)
CO2 SERPL-SCNC: 24 MMOL/L (ref 21–32)
CREAT BLD-MCNC: 0.74 MG/DL (ref 0.55–1.02)
DEPRECATED RDW RBC AUTO: 45.1 FL (ref 35.1–46.3)
EOSINOPHIL # BLD AUTO: 0.09 X10(3) UL (ref 0–0.7)
EOSINOPHIL NFR BLD AUTO: 3.1 %
ERYTHROCYTE [DISTWIDTH] IN BLOOD BY AUTOMATED COUNT: 13.7 % (ref 11–15)
GLOBULIN PLAS-MCNC: 3 G/DL (ref 2.8–4.4)
GLUCOSE BLD-MCNC: 92 MG/DL (ref 70–99)
HCT VFR BLD AUTO: 37.5 % (ref 35–48)
HGB BLD-MCNC: 12.2 G/DL (ref 12–16)
IMM GRANULOCYTES # BLD AUTO: 0.01 X10(3) UL (ref 0–1)
IMM GRANULOCYTES NFR BLD: 0.3 %
LYMPHOCYTES # BLD AUTO: 0.79 X10(3) UL (ref 1–4)
LYMPHOCYTES NFR BLD AUTO: 26.9 %
M PROTEIN MFR SERPL ELPH: 6.8 G/DL (ref 6.4–8.2)
MCH RBC QN AUTO: 29.3 PG (ref 26–34)
MCHC RBC AUTO-ENTMCNC: 32.5 G/DL (ref 31–37)
MCV RBC AUTO: 89.9 FL (ref 80–100)
MONOCYTES # BLD AUTO: 0.23 X10(3) UL (ref 0.1–1)
MONOCYTES NFR BLD AUTO: 7.8 %
NEUTROPHILS # BLD AUTO: 1.8 X10 (3) UL (ref 1.5–7.7)
NEUTROPHILS # BLD AUTO: 1.8 X10(3) UL (ref 1.5–7.7)
NEUTROPHILS NFR BLD AUTO: 61.2 %
OSMOLALITY SERPL CALC.SUM OF ELEC: 294 MOSM/KG (ref 275–295)
PATIENT FASTING Y/N/NP: NO
PLATELET # BLD AUTO: 164 10(3)UL (ref 150–450)
POTASSIUM SERPL-SCNC: 3.7 MMOL/L (ref 3.5–5.1)
RBC # BLD AUTO: 4.17 X10(6)UL (ref 3.8–5.3)
SODIUM SERPL-SCNC: 142 MMOL/L (ref 136–145)
TSI SER-ACNC: 0.72 MIU/ML (ref 0.36–3.74)
WBC # BLD AUTO: 2.9 X10(3) UL (ref 4–11)

## 2020-03-19 PROCEDURE — 80053 COMPREHEN METABOLIC PANEL: CPT

## 2020-03-19 PROCEDURE — 85025 COMPLETE CBC W/AUTO DIFF WBC: CPT

## 2020-03-19 PROCEDURE — 84443 ASSAY THYROID STIM HORMONE: CPT

## 2020-03-19 PROCEDURE — 36591 DRAW BLOOD OFF VENOUS DEVICE: CPT

## 2020-03-19 RX ORDER — SODIUM CHLORIDE 0.9 % (FLUSH) 0.9 %
10 SYRINGE (ML) INJECTION ONCE
Status: COMPLETED | OUTPATIENT
Start: 2020-03-19 | End: 2020-03-19

## 2020-03-19 RX ORDER — SODIUM CHLORIDE 0.9 % (FLUSH) 0.9 %
10 SYRINGE (ML) INJECTION ONCE
Status: CANCELLED | OUTPATIENT
Start: 2020-03-19

## 2020-03-19 RX ADMIN — SODIUM CHLORIDE 0.9 % (FLUSH) 10 ML: 0.9 % SYRINGE (ML) INJECTION at 11:07:00

## 2020-04-17 ENCOUNTER — PATIENT MESSAGE (OUTPATIENT)
Dept: INTERNAL MEDICINE CLINIC | Facility: CLINIC | Age: 60
End: 2020-04-17

## 2020-04-17 RX ORDER — LEVOTHYROXINE SODIUM 0.07 MG/1
75 TABLET ORAL
Qty: 90 TABLET | Refills: 0 | Status: SHIPPED | OUTPATIENT
Start: 2020-04-17 | End: 2020-07-13

## 2020-04-17 RX ORDER — LEVOTHYROXINE SODIUM 75 UG/1
TABLET ORAL
Qty: 90 TABLET | Refills: 0 | OUTPATIENT
Start: 2020-04-17

## 2020-04-17 NOTE — TELEPHONE ENCOUNTER
LOV: 2/6/20 for strain of lumbar region  TSH: 3/19/20 0.719    Thyroid Supplements Protocol Passed4/16 11:42 PM   TSH test in past 12 months    TSH value between 0.350 and 5.500 IU/ml    Appointment in past 12 or next 3 months     Future Appointments   Lencho

## 2020-04-17 NOTE — TELEPHONE ENCOUNTER
From: Taylor Rios  To: CLAUDIA Donald  Sent: 4/17/2020 12:18 AM CDT  Subject: Visit Follow-up Question    I have really thought hard about during the therapy & exercise plus it has been a year, I am really frustrated that I have lost only 25 amna

## 2020-04-20 NOTE — TELEPHONE ENCOUNTER
I understand her frustration but she has been able to reduce her weight and maintain this which is a huge success and has a big impact on her health and reducing her risk for future cancer.  I would like her to consider health coaching with [facility] if that is

## 2020-04-24 NOTE — TELEPHONE ENCOUNTER
The Topamax is at the top dose for weight loss. Metformin has been shown to offset the impact antipsychotic medications can have on weight gain, this is why I have recommended this option.

## 2020-04-27 ENCOUNTER — TELEPHONE (OUTPATIENT)
Dept: FAMILY MEDICINE CLINIC | Facility: CLINIC | Age: 60
End: 2020-04-27

## 2020-04-30 RX ORDER — METFORMIN HYDROCHLORIDE 750 MG/1
TABLET, EXTENDED RELEASE ORAL
Qty: 60 TABLET | Refills: 0 | Status: SHIPPED | OUTPATIENT
Start: 2020-04-30 | End: 2020-05-18

## 2020-05-01 ENCOUNTER — TELEPHONE (OUTPATIENT)
Dept: INTERNAL MEDICINE CLINIC | Facility: CLINIC | Age: 60
End: 2020-05-01

## 2020-05-01 NOTE — TELEPHONE ENCOUNTER
I called [facility] and they do need collaborating MD since Ramírez Ratliff is NP - it sometimes is required even for non-controlled meds. Given DAMION Nicholas and NPI to Bao Rosas at the pharmacy. Will fill.

## 2020-05-01 NOTE — TELEPHONE ENCOUNTER
Received faxed request to send Metformin with collaborating physician information?   I tried calling and they are closed until 9 am.

## 2020-05-18 ENCOUNTER — VIRTUAL PHONE E/M (OUTPATIENT)
Dept: INTERNAL MEDICINE CLINIC | Facility: CLINIC | Age: 60
End: 2020-05-18
Payer: MEDICARE

## 2020-05-18 DIAGNOSIS — E66.01 SEVERE OBESITY (HCC): ICD-10-CM

## 2020-05-18 DIAGNOSIS — E66.9 OBESITY (BMI 30-39.9): ICD-10-CM

## 2020-05-18 DIAGNOSIS — Z51.81 ENCOUNTER FOR THERAPEUTIC DRUG MONITORING: Primary | ICD-10-CM

## 2020-05-18 PROCEDURE — 99442 PHONE E/M BY PHYS 11-20 MIN: CPT | Performed by: NURSE PRACTITIONER

## 2020-05-18 RX ORDER — METFORMIN HYDROCHLORIDE 750 MG/1
1500 TABLET, EXTENDED RELEASE ORAL
Qty: 180 TABLET | Refills: 1 | Status: SHIPPED | OUTPATIENT
Start: 2020-05-18 | End: 2020-11-18

## 2020-05-18 NOTE — PROGRESS NOTES
Virtual Telephone Check-In    Taylor Rios verbally consents to a Virtual/Telephone Check-In visit on 5/18/2020. Patient understands and accepts financial responsibility for any deductible, co-insurance and/or co-pays associated with this service. at home and behavior change tips/tools  -     metFORMIN HCl  MG Oral Tablet 24 Hr; Take 2 tablets (1,500 mg total) by mouth daily with food. Obesity (BMI 30-39.9)  -     metFORMIN HCl  MG Oral Tablet 24 Hr;  Take 2 tablets (1,500 mg total) by

## 2020-05-18 NOTE — PATIENT INSTRUCTIONS
Thank you for taking the time for our virtual encounter today! Here are the next steps and plans we discussed:    1. Continue your medications, no changes.   2. Begin to establish a routine of regular exercise, considering at home FREE options like Peloton cheese sauce or other processed flavoring are high in fat and salt. It's healthier to season plain frozen vegetables yourself. Try fresh herbs, garlic, toasted almonds, or sesame seeds. · Canned vegetables often have lots of salt.  Shop for low-sodium vari

## 2020-06-22 ENCOUNTER — TELEPHONE (OUTPATIENT)
Dept: FAMILY MEDICINE CLINIC | Facility: CLINIC | Age: 60
End: 2020-06-22

## 2020-06-23 DIAGNOSIS — G25.81 RESTLESS LEG SYNDROME: ICD-10-CM

## 2020-06-23 RX ORDER — PREGABALIN 50 MG/1
CAPSULE ORAL
Qty: 30 CAPSULE | Refills: 0 | Status: SHIPPED | OUTPATIENT
Start: 2020-06-23 | End: 2020-07-17 | Stop reason: DRUGHIGH

## 2020-06-23 RX ORDER — PREGABALIN 75 MG/1
150 CAPSULE ORAL 2 TIMES DAILY
Qty: 120 CAPSULE | Refills: 0 | Status: SHIPPED | OUTPATIENT
Start: 2020-06-23 | End: 2020-07-17

## 2020-06-23 NOTE — TELEPHONE ENCOUNTER
See alternate my chart TE from 6/22/20. Patient verified Pregabalin dosage and refill request routed to Dr Jackelin Bailey.     Medication: Pregabalin 50mg; Pregabalin 75mg    Date of last refill: 3/16/20 (#30/1); 3/16/20 (#180/1)  [place]

## 2020-07-01 ENCOUNTER — TELEPHONE (OUTPATIENT)
Dept: FAMILY MEDICINE CLINIC | Facility: CLINIC | Age: 60
End: 2020-07-01

## 2020-07-01 NOTE — TELEPHONE ENCOUNTER
----- Message from Awilda Salinas RN sent at 7/26/2019  2:21 PM CDT -----  Repeat CT scan of abdomen and pelvis in one year

## 2020-07-13 ENCOUNTER — TELEPHONE (OUTPATIENT)
Dept: FAMILY MEDICINE CLINIC | Facility: CLINIC | Age: 60
End: 2020-07-13

## 2020-07-13 RX ORDER — LEVOTHYROXINE SODIUM 0.07 MG/1
75 TABLET ORAL
Qty: 90 TABLET | Refills: 0 | Status: SHIPPED | OUTPATIENT
Start: 2020-07-13 | End: 2020-10-21

## 2020-07-13 NOTE — TELEPHONE ENCOUNTER
LOV with DB on 2/6/20 for lumbar strain. TSH done 3/19/20 was WNL. Levothyroxine Sodium (EUTHYROX) 75 MCG Oral Tab 90 tablet 0 4/17/2020    Sig:   Take 1 tablet (75 mcg total) by mouth every morning before breakfast.     Route:   Oral       Rx filled.

## 2020-07-13 NOTE — TELEPHONE ENCOUNTER
Pt called stated she needs a refill of her thyroid medication would like sent to [facility]

## 2020-07-17 ENCOUNTER — OFFICE VISIT (OUTPATIENT)
Dept: NEUROLOGY | Facility: CLINIC | Age: 60
End: 2020-07-17
Payer: MEDICARE

## 2020-07-17 VITALS
BODY MASS INDEX: 29 KG/M2 | DIASTOLIC BLOOD PRESSURE: 76 MMHG | WEIGHT: 168 LBS | RESPIRATION RATE: 16 BRPM | SYSTOLIC BLOOD PRESSURE: 122 MMHG | HEART RATE: 68 BPM

## 2020-07-17 DIAGNOSIS — G25.81 RESTLESS LEG SYNDROME: Primary | ICD-10-CM

## 2020-07-17 PROCEDURE — 99213 OFFICE O/P EST LOW 20 MIN: CPT | Performed by: OTHER

## 2020-07-17 PROCEDURE — 3078F DIAST BP <80 MM HG: CPT | Performed by: OTHER

## 2020-07-17 PROCEDURE — 3074F SYST BP LT 130 MM HG: CPT | Performed by: OTHER

## 2020-07-17 RX ORDER — PREGABALIN 75 MG/1
150 CAPSULE ORAL 2 TIMES DAILY
Qty: 120 CAPSULE | Refills: 3 | Status: SHIPPED | OUTPATIENT
Start: 2020-07-17 | End: 2020-10-21

## 2020-07-17 NOTE — PROGRESS NOTES
Neurology H&P    Swetha Loop Patient Status:  No patient class for patient encounter    1960 MRN [id number]   Location [address] [place] [address], [address] [place] [address] Drive, [address] [place] [address] Attending No att. providers found   Hosp Day # 0 PCP Amy Fernandez DO     Sub with 150mg tablet at night only for total dose of 150mg am anf 200mg nightly 30 capsule 0   • pregabalin 75 MG Oral Cap Take 2 capsules (150 mg total) by mouth 2 (two) times daily.  Take 1 tab (75mg) am and 2 tabs (150mg) pm for 1 week then if needed increa Exposure to radiation     last dose February 2014   • Fibromyalgia    • History of blood transfusion 2013   • Hypothyroidism (acquired)     Dx in 8/2017   • Migraines     in the past   • Neoplasm, breast    • Personal history of antineoplastic chemotherapy pulse 68, resp. rate 16, weight 168 lb (76.2 kg), not currently breastfeeding.     Gen: Awake and in no apparent distress  HEENT: moist mucus membranes  Neck: Supple  Cardiovascular: Regular rate and rhythm, no murmur  Pulm: CTAB  GI: non-tender, normal bow

## 2020-07-29 ENCOUNTER — OFFICE VISIT (OUTPATIENT)
Dept: FAMILY MEDICINE CLINIC | Facility: CLINIC | Age: 60
End: 2020-07-29
Payer: MEDICARE

## 2020-07-29 VITALS
SYSTOLIC BLOOD PRESSURE: 136 MMHG | BODY MASS INDEX: 29 KG/M2 | HEART RATE: 72 BPM | RESPIRATION RATE: 16 BRPM | WEIGHT: 164 LBS | TEMPERATURE: 98 F | DIASTOLIC BLOOD PRESSURE: 84 MMHG | OXYGEN SATURATION: 97 %

## 2020-07-29 DIAGNOSIS — C21.0 ANAL CANCER (HCC): ICD-10-CM

## 2020-07-29 DIAGNOSIS — E03.9 HYPOTHYROIDISM (ACQUIRED): Primary | ICD-10-CM

## 2020-07-29 DIAGNOSIS — L65.9 ALOPECIA: ICD-10-CM

## 2020-07-29 LAB
ALBUMIN SERPL-MCNC: 4.3 G/DL (ref 3.4–5)
ALBUMIN/GLOB SERPL: 1.4 {RATIO} (ref 1–2)
ALP LIVER SERPL-CCNC: 79 U/L (ref 46–118)
ALT SERPL-CCNC: 34 U/L (ref 13–56)
ANION GAP SERPL CALC-SCNC: 3 MMOL/L (ref 0–18)
AST SERPL-CCNC: 20 U/L (ref 15–37)
BILIRUB SERPL-MCNC: 0.4 MG/DL (ref 0.1–2)
BUN BLD-MCNC: 16 MG/DL (ref 7–18)
BUN/CREAT SERPL: 16.7 (ref 10–20)
CALCIUM BLD-MCNC: 10.6 MG/DL (ref 8.5–10.1)
CHLORIDE SERPL-SCNC: 113 MMOL/L (ref 98–112)
CO2 SERPL-SCNC: 27 MMOL/L (ref 21–32)
CREAT BLD-MCNC: 0.96 MG/DL (ref 0.55–1.02)
DEPRECATED RDW RBC AUTO: 45.1 FL (ref 35.1–46.3)
ERYTHROCYTE [DISTWIDTH] IN BLOOD BY AUTOMATED COUNT: 13.4 % (ref 11–15)
GLOBULIN PLAS-MCNC: 3.1 G/DL (ref 2.8–4.4)
GLUCOSE BLD-MCNC: 96 MG/DL (ref 70–99)
HCT VFR BLD AUTO: 40.7 % (ref 35–48)
HGB BLD-MCNC: 13.4 G/DL (ref 12–16)
M PROTEIN MFR SERPL ELPH: 7.4 G/DL (ref 6.4–8.2)
MCH RBC QN AUTO: 30.1 PG (ref 26–34)
MCHC RBC AUTO-ENTMCNC: 32.9 G/DL (ref 31–37)
MCV RBC AUTO: 91.5 FL (ref 80–100)
OSMOLALITY SERPL CALC.SUM OF ELEC: 297 MOSM/KG (ref 275–295)
PATIENT FASTING Y/N/NP: YES
PLATELET # BLD AUTO: 205 10(3)UL (ref 150–450)
POTASSIUM SERPL-SCNC: 4 MMOL/L (ref 3.5–5.1)
RBC # BLD AUTO: 4.45 X10(6)UL (ref 3.8–5.3)
SODIUM SERPL-SCNC: 143 MMOL/L (ref 136–145)
T4 FREE SERPL-MCNC: 0.7 NG/DL (ref 0.8–1.7)
TSI SER-ACNC: 5.95 MIU/ML (ref 0.36–3.74)
WBC # BLD AUTO: 3.7 X10(3) UL (ref 4–11)

## 2020-07-29 PROCEDURE — 3075F SYST BP GE 130 - 139MM HG: CPT | Performed by: FAMILY MEDICINE

## 2020-07-29 PROCEDURE — 99214 OFFICE O/P EST MOD 30 MIN: CPT | Performed by: FAMILY MEDICINE

## 2020-07-29 PROCEDURE — 80053 COMPREHEN METABOLIC PANEL: CPT | Performed by: FAMILY MEDICINE

## 2020-07-29 PROCEDURE — 84443 ASSAY THYROID STIM HORMONE: CPT | Performed by: FAMILY MEDICINE

## 2020-07-29 PROCEDURE — 84439 ASSAY OF FREE THYROXINE: CPT | Performed by: FAMILY MEDICINE

## 2020-07-29 PROCEDURE — 85027 COMPLETE CBC AUTOMATED: CPT | Performed by: FAMILY MEDICINE

## 2020-07-29 PROCEDURE — 3079F DIAST BP 80-89 MM HG: CPT | Performed by: FAMILY MEDICINE

## 2020-07-29 NOTE — PROGRESS NOTES
CC: follow up    HPI:     Hypothyroidism (acquired)  Chronic, stable. Compliant. Energy levels better with better diet. Has lost weight.      Alopecia  Location head  Quality diffuse  Severity moderate  Duration 3 months or so  Timing daily  Context no angelo Medical History:   Diagnosis Date   • Anal cancer [facility]) 2013   • Anesthesia complication     slow to arouse   • Anxiety state    • Asthma     excerise induced   • Back problem     upper back pain from radiation   • Bipolar disorder (HCC)    • Blood disorde

## 2020-08-07 ENCOUNTER — TELEPHONE (OUTPATIENT)
Dept: FAMILY MEDICINE CLINIC | Facility: CLINIC | Age: 60
End: 2020-08-07

## 2020-08-07 DIAGNOSIS — L65.9 HAIR LOSS: Primary | ICD-10-CM

## 2020-08-07 NOTE — TELEPHONE ENCOUNTER
Patient is taking levothyroxine medication 75 mcg  Patient wanted to know if dose is going to be increased based on her results  Would like sent to [facility]    Patient also what dermatologist does Dr. Agatha Moreland recommend for hair loss  Please advise.

## 2020-08-07 NOTE — TELEPHONE ENCOUNTER
Patient advised and verbralized understanding  Patient wondering wanted to know why the medication dose is not changing and when she should have her blood work done [facility]  Patient would like detailed message left

## 2020-08-07 NOTE — TELEPHONE ENCOUNTER
Pt called stated Dr. Elin Hayden wanted to know if she was taking her thyroid medication and she is. She stated Dr. Elin Hayden was going to refer her to a dermatologist for a hair situation.  She was wondering if Dr. Elin Hayden was going to send in a rx for her thyro

## 2020-08-07 NOTE — TELEPHONE ENCOUNTER
She reports that she is compliant with medication daily. Often, a single fluctuation happens from time to time and we need to repeat the level in a month to see if it is persistently changed. If the TSH remains elevated, then will change dose at that time.

## 2020-08-18 ENCOUNTER — OFFICE VISIT (OUTPATIENT)
Dept: INTERNAL MEDICINE CLINIC | Facility: CLINIC | Age: 60
End: 2020-08-18
Payer: MEDICARE

## 2020-08-18 VITALS
BODY MASS INDEX: 29.4 KG/M2 | WEIGHT: 168 LBS | SYSTOLIC BLOOD PRESSURE: 120 MMHG | RESPIRATION RATE: 14 BRPM | HEIGHT: 63.5 IN | HEART RATE: 68 BPM | DIASTOLIC BLOOD PRESSURE: 74 MMHG

## 2020-08-18 DIAGNOSIS — Z51.81 ENCOUNTER FOR THERAPEUTIC DRUG MONITORING: Primary | ICD-10-CM

## 2020-08-18 DIAGNOSIS — E66.9 OBESITY (BMI 30-39.9): ICD-10-CM

## 2020-08-18 DIAGNOSIS — E66.01 SEVERE OBESITY (HCC): ICD-10-CM

## 2020-08-18 PROCEDURE — 3078F DIAST BP <80 MM HG: CPT | Performed by: NURSE PRACTITIONER

## 2020-08-18 PROCEDURE — 3074F SYST BP LT 130 MM HG: CPT | Performed by: NURSE PRACTITIONER

## 2020-08-18 PROCEDURE — 99213 OFFICE O/P EST LOW 20 MIN: CPT | Performed by: NURSE PRACTITIONER

## 2020-08-18 PROCEDURE — 3008F BODY MASS INDEX DOCD: CPT | Performed by: NURSE PRACTITIONER

## 2020-08-18 RX ORDER — TOPIRAMATE 100 MG/1
100 TABLET, FILM COATED ORAL 2 TIMES DAILY
Qty: 180 TABLET | Refills: 1 | Status: SHIPPED | OUTPATIENT
Start: 2020-08-18 | End: 2021-02-02

## 2020-08-18 NOTE — PROGRESS NOTES
Devaughn Bruno is a 61year old female presents today for follow-up on medical weight loss program for the treatment of overweight, obesity, or morbid obesity.     S:  Current weight Wt Readings from Last 6 Encounters:  08/18/20 : 168 lb (76.2 kg)  07/29/2 breathing non labored  CARDIO: RRR without murmur, normal S1 and S2 without clicks or gallops, no pedal edema.   GI: +BS  NEURO/MS: motor and sensory grossly intact  PSYCH: pleasant, cooperative, normal mood and affect    ASSESSMENT AND PLAN:  Encounter for Eat only in an 8 hour window, fast for 16 hours consecutively of the day. Drinking water during the fasting time is okay. I recommend the boyd Zero- Fasting Tracker to help support and provide accountability.     Feast/Fast: Alternate between fast day (500 c mindlessly consume more unhealthy food. The scary part is that this process keeps repeating itself.   The reason for this toxic hunger is that these fat cells are always starving which gives incorrect signals to our brain and we continue eating more and mor diet.  Consume a good amount of legumes and high fiber cereals every day. Whole foods such as whole grains, seeds, nuts, and green leafy vegetables are high in protein and low in calories and fat.  Increasing their consumption will eventually help you to co

## 2020-08-18 NOTE — PATIENT INSTRUCTIONS
Continue making lifestyle changes that focus on good nutrition, regular exercise and stress management. Medication Plan: Continue current medication regimen.     Next steps to work on before next office visit include: Great work over these pandemic times levels. However, hunger is of two types – toxic hunger and true hunger. What is Toxic Hunger? Our body experiences toxic hunger because we do not get a proper amount of micronutrients in our diet.  Further, the food we eat these days consists of processe and food cravings can be avoided by consumption of the right type of food. Today, we live in a world where we have easy access to a variety of processed and junk food. However, one should try to avoid these as much as possible so as to get rid of obesity.

## 2020-08-19 ENCOUNTER — PATIENT OUTREACH (OUTPATIENT)
Dept: FAMILY MEDICINE CLINIC | Facility: CLINIC | Age: 60
End: 2020-08-19

## 2020-09-08 ENCOUNTER — TELEPHONE (OUTPATIENT)
Dept: FAMILY MEDICINE CLINIC | Facility: CLINIC | Age: 60
End: 2020-09-08

## 2020-09-08 NOTE — TELEPHONE ENCOUNTER
Juan Antonio Castellanos, RN  Danika Betts Nurse             Repeat TSH in 1 month      [facility] sent-also due for MAGEENA

## 2020-09-11 ENCOUNTER — TELEPHONE (OUTPATIENT)
Dept: FAMILY MEDICINE CLINIC | Facility: CLINIC | Age: 60
End: 2020-09-11

## 2020-09-11 DIAGNOSIS — C21.0 ANAL CARCINOMA (HCC): ICD-10-CM

## 2020-09-11 DIAGNOSIS — E03.9 HYPOTHYROIDISM (ACQUIRED): Primary | ICD-10-CM

## 2020-09-11 NOTE — TELEPHONE ENCOUNTER
LMTCB - need to notify referral placed and labs ordered  Per reminder on 9/8 order for TSH okay to be placed  Order placed  Referral also placed for Dr. Jose C Vega

## 2020-09-11 NOTE — TELEPHONE ENCOUNTER
Pt called back. Informed referral and lab order has been placed. Just wanting for referral to be authorized by the referral department. Pt states she understood and will check back Tuesday.

## 2020-09-11 NOTE — TELEPHONE ENCOUNTER
PATIENT IS HAVING HER PORT FLUSHED ON THE 15TH AND WANTS TO KNOW IF AN ORDER FOR THYROID CAN BE PUT IN.  SHE IS SEE DR Albert Orellana ON THE 17TH AND NEEDS A REFERRAL FOR HIM

## 2020-09-15 ENCOUNTER — NURSE ONLY (OUTPATIENT)
Dept: HEMATOLOGY/ONCOLOGY | Age: 60
End: 2020-09-15
Attending: INTERNAL MEDICINE
Payer: MEDICARE

## 2020-09-15 DIAGNOSIS — E03.9 HYPOTHYROIDISM (ACQUIRED): ICD-10-CM

## 2020-09-15 LAB
ALBUMIN SERPL-MCNC: 3.8 G/DL (ref 3.4–5)
ALBUMIN/GLOB SERPL: 1.4 {RATIO} (ref 1–2)
ALP LIVER SERPL-CCNC: 77 U/L (ref 46–118)
ALT SERPL-CCNC: 33 U/L (ref 13–56)
ANION GAP SERPL CALC-SCNC: 5 MMOL/L (ref 0–18)
AST SERPL-CCNC: 20 U/L (ref 15–37)
BASOPHILS # BLD AUTO: 0.02 X10(3) UL (ref 0–0.2)
BASOPHILS NFR BLD AUTO: 0.5 %
BILIRUB SERPL-MCNC: 0.2 MG/DL (ref 0.1–2)
BUN BLD-MCNC: 16 MG/DL (ref 7–18)
BUN/CREAT SERPL: 20 (ref 10–20)
CALCIUM BLD-MCNC: 9.5 MG/DL (ref 8.5–10.1)
CHLORIDE SERPL-SCNC: 113 MMOL/L (ref 98–112)
CO2 SERPL-SCNC: 24 MMOL/L (ref 21–32)
CREAT BLD-MCNC: 0.8 MG/DL (ref 0.55–1.02)
DEPRECATED RDW RBC AUTO: 46.5 FL (ref 35.1–46.3)
EOSINOPHIL # BLD AUTO: 0.11 X10(3) UL (ref 0–0.7)
EOSINOPHIL NFR BLD AUTO: 2.6 %
ERYTHROCYTE [DISTWIDTH] IN BLOOD BY AUTOMATED COUNT: 13.6 % (ref 11–15)
GLOBULIN PLAS-MCNC: 2.8 G/DL (ref 2.8–4.4)
GLUCOSE BLD-MCNC: 87 MG/DL (ref 70–99)
HCT VFR BLD AUTO: 39.1 % (ref 35–48)
HGB BLD-MCNC: 12.7 G/DL (ref 12–16)
IMM GRANULOCYTES # BLD AUTO: 0.01 X10(3) UL (ref 0–1)
IMM GRANULOCYTES NFR BLD: 0.2 %
LYMPHOCYTES # BLD AUTO: 1.22 X10(3) UL (ref 1–4)
LYMPHOCYTES NFR BLD AUTO: 28.6 %
M PROTEIN MFR SERPL ELPH: 6.6 G/DL (ref 6.4–8.2)
MCH RBC QN AUTO: 30 PG (ref 26–34)
MCHC RBC AUTO-ENTMCNC: 32.5 G/DL (ref 31–37)
MCV RBC AUTO: 92.2 FL (ref 80–100)
MONOCYTES # BLD AUTO: 0.3 X10(3) UL (ref 0.1–1)
MONOCYTES NFR BLD AUTO: 7 %
NEUTROPHILS # BLD AUTO: 2.6 X10 (3) UL (ref 1.5–7.7)
NEUTROPHILS # BLD AUTO: 2.6 X10(3) UL (ref 1.5–7.7)
NEUTROPHILS NFR BLD AUTO: 61.1 %
OSMOLALITY SERPL CALC.SUM OF ELEC: 295 MOSM/KG (ref 275–295)
PLATELET # BLD AUTO: 185 10(3)UL (ref 150–450)
POTASSIUM SERPL-SCNC: 3.8 MMOL/L (ref 3.5–5.1)
RBC # BLD AUTO: 4.24 X10(6)UL (ref 3.8–5.3)
SODIUM SERPL-SCNC: 142 MMOL/L (ref 136–145)
TSI SER-ACNC: 2.4 MIU/ML (ref 0.36–3.74)
WBC # BLD AUTO: 4.3 X10(3) UL (ref 4–11)

## 2020-09-15 PROCEDURE — 84443 ASSAY THYROID STIM HORMONE: CPT

## 2020-09-15 PROCEDURE — 36591 DRAW BLOOD OFF VENOUS DEVICE: CPT

## 2020-09-15 PROCEDURE — 85025 COMPLETE CBC W/AUTO DIFF WBC: CPT

## 2020-09-15 PROCEDURE — 80053 COMPREHEN METABOLIC PANEL: CPT

## 2020-09-17 ENCOUNTER — TELEPHONE (OUTPATIENT)
Dept: FAMILY MEDICINE CLINIC | Facility: CLINIC | Age: 60
End: 2020-09-17

## 2020-09-17 ENCOUNTER — OFFICE VISIT (OUTPATIENT)
Dept: HEMATOLOGY/ONCOLOGY | Age: 60
End: 2020-09-17
Attending: INTERNAL MEDICINE
Payer: MEDICARE

## 2020-09-17 VITALS
SYSTOLIC BLOOD PRESSURE: 120 MMHG | TEMPERATURE: 97 F | HEART RATE: 68 BPM | BODY MASS INDEX: 25 KG/M2 | RESPIRATION RATE: 18 BRPM | DIASTOLIC BLOOD PRESSURE: 79 MMHG | OXYGEN SATURATION: 98 % | WEIGHT: 142.69 LBS

## 2020-09-17 DIAGNOSIS — E03.9 HYPOTHYROIDISM (ACQUIRED): Primary | ICD-10-CM

## 2020-09-17 DIAGNOSIS — C21.0 ANAL CARCINOMA (HCC): Primary | ICD-10-CM

## 2020-09-17 PROCEDURE — 99213 OFFICE O/P EST LOW 20 MIN: CPT | Performed by: INTERNAL MEDICINE

## 2020-09-17 NOTE — TELEPHONE ENCOUNTER
Pt called lvm stated she received her lab results regarding her TSH and pt stated  \"theymay look ok but they are not. \" She stated she is still not feeling good her joints ache along with some other issues.  She stated \" Dr. Stephanie Granados needs to rethink this

## 2020-09-17 NOTE — PROGRESS NOTES
Cancer Center Progress Note  Patient Name: Sada Lamas   YOB: 1960   Medical Record Number: [id number]   CSN: [id number]   Attending Physician: Robson Pyle M.D.        Date of Visit: 9/17/2020    Chief Complaint:  Patient presents of the fingertips, and alopecia. These have all developed or worsened in the past few months. No blood in the stool.      Performance Status:  ECOG 1    Past Medical History:  Past Medical History:   Diagnosis Date   • Anal cancer ([facility] [address] 2013   • Anesthesia Hypothyroidism on [place] thyroid       Social History:  Social History    Socioeconomic History      Marital status:       Spouse name: Not on file      Number of children: Not on file      Years of education: Not on file      Highest education leve Bike Helmet: Not Asked        Seat Belt: Yes        Self-Exams: Not Asked    Social History Narrative      Not on file      Current Medications:    Current Outpatient Medications:   •  topiramate 100 MG Oral Tab, Take 1 tablet (100 mg total) by mouth 2 (tw (SEE COMMENTS)    Comment:Restless legs     Review of Systems:    Constitutional No fevers, chills, night sweats, excessive fatigue or weight loss. Eyes No significant visual difficulties. No diplopia. No yellowing of the eyes.    Hematologic/Lymphatic No cranial nerves intact. Psychiatric Normal - Alert and oriented times three. Coherent speech. Verbalizes understanding of our discussions today.          Laboratory:  Recent Labs     09/15/20  1005   RBC 4.24   HGB 12.7   HCT 39.1   MCV 92.2   MCH 30.0   M

## 2020-09-18 ENCOUNTER — OFFICE VISIT (OUTPATIENT)
Dept: SURGERY | Facility: CLINIC | Age: 60
End: 2020-09-18
Payer: MEDICARE

## 2020-09-18 VITALS — BODY MASS INDEX: 29.4 KG/M2 | TEMPERATURE: 97 F | HEIGHT: 63.5 IN | WEIGHT: 168 LBS

## 2020-09-18 DIAGNOSIS — C21.0 ANAL SQUAMOUS CELL CARCINOMA (HCC): Primary | ICD-10-CM

## 2020-09-18 PROCEDURE — 3008F BODY MASS INDEX DOCD: CPT | Performed by: SURGERY

## 2020-09-18 PROCEDURE — 99214 OFFICE O/P EST MOD 30 MIN: CPT | Performed by: SURGERY

## 2020-09-18 NOTE — H&P
Sis Rai is a 61year old female  Patient presents with:  Consult: Patient here to discuss port removal       REFERRED BY    Patient presents with anal squamous cell carcinoma diagnosed in 2013. Patient underwent radiation therapy.   She presents fo topiramate 100 MG Oral Tab, Take 1 tablet (100 mg total) by mouth 2 (two) times daily. , Disp: 180 tablet, Rfl: 1  pregabalin 75 MG Oral Cap, Take 2 capsules (150 mg total) by mouth 2 (two) times daily. , Disp: 120 capsule, Rfl: 3  Levothyroxine Sodium ( jaundice  HEENT: denies nasal congestion, sinus pain or sore throat; hearing loss negative,   EYES , no diplopia or vision changes  RESPIRATORY: denies shortness of breath, wheezing or cough   CARDIOVASCULAR: denies chest pain or COLLAZO; no palpitations   GI: however, high dose daily dietary supplements may contain biotin concentrations greater than 150 times (5–10 mg) the RDA.   It is recommended that physicians ask all patients who may be on biotin supplementation to stop biotin consumption at least 72 hours p 09/15/2020 2.60  1.50 - 7.70 x10 (3) uL Final   • Neutrophil Absolute 09/15/2020 2.60  1.50 - 7.70 x10(3) uL Final   • Lymphocyte Absolute 09/15/2020 1.22  1.00 - 4.00 x10(3) uL Final   • Monocyte Absolute 09/15/2020 0.30  0.10 - 1.00 x10(3) uL Final   • E

## 2020-09-21 NOTE — TELEPHONE ENCOUNTER
Referral placed.    Dr Melinda Montilla: [phone]  Dr. Kristin Fontanez: [facility]

## 2020-09-29 ENCOUNTER — TELEPHONE (OUTPATIENT)
Dept: FAMILY MEDICINE CLINIC | Facility: CLINIC | Age: 60
End: 2020-09-29

## 2020-09-29 RX ORDER — ACETAMINOPHEN 500 MG
1000 TABLET ORAL ONCE
Status: CANCELLED | OUTPATIENT
Start: 2020-09-29 | End: 2020-09-29

## 2020-09-29 NOTE — TELEPHONE ENCOUNTER
Patient states was returning call from Monday. Advised it was the info we sent her in [facility] for endo. States received this info. No further questions.

## 2020-09-30 ENCOUNTER — TELEPHONE (OUTPATIENT)
Dept: SURGERY | Facility: CLINIC | Age: 60
End: 2020-09-30

## 2020-10-01 ENCOUNTER — PATIENT OUTREACH (OUTPATIENT)
Dept: FAMILY MEDICINE CLINIC | Facility: CLINIC | Age: 60
End: 2020-10-01

## 2020-10-02 ENCOUNTER — PATIENT OUTREACH (OUTPATIENT)
Dept: FAMILY MEDICINE CLINIC | Facility: CLINIC | Age: 60
End: 2020-10-02

## 2020-10-02 ENCOUNTER — APPOINTMENT (OUTPATIENT)
Dept: LAB | Age: 60
End: 2020-10-02
Attending: SURGERY
Payer: MEDICARE

## 2020-10-02 DIAGNOSIS — C21.0 ANAL CARCINOMA (HCC): ICD-10-CM

## 2020-10-05 ENCOUNTER — HOSPITAL ENCOUNTER (OUTPATIENT)
Facility: HOSPITAL | Age: 60
Setting detail: HOSPITAL OUTPATIENT SURGERY
Discharge: HOME | End: 2020-10-05
Attending: SURGERY | Admitting: SURGERY
Payer: MEDICARE

## 2020-10-05 ENCOUNTER — ANESTHESIA EVENT (OUTPATIENT)
Dept: SURGERY | Facility: HOSPITAL | Age: 60
End: 2020-10-05
Payer: MEDICARE

## 2020-10-05 ENCOUNTER — ANESTHESIA (OUTPATIENT)
Dept: SURGERY | Facility: HOSPITAL | Age: 60
End: 2020-10-05
Payer: MEDICARE

## 2020-10-05 VITALS
BODY MASS INDEX: 30.86 KG/M2 | WEIGHT: 176.38 LBS | DIASTOLIC BLOOD PRESSURE: 78 MMHG | SYSTOLIC BLOOD PRESSURE: 127 MMHG | RESPIRATION RATE: 16 BRPM | OXYGEN SATURATION: 99 % | HEIGHT: 63.5 IN | TEMPERATURE: 97 F | HEART RATE: 62 BPM

## 2020-10-05 DIAGNOSIS — C21.0 ANAL SQUAMOUS CELL CARCINOMA (HCC): ICD-10-CM

## 2020-10-05 DIAGNOSIS — C21.0 ANAL CARCINOMA (HCC): Primary | ICD-10-CM

## 2020-10-05 PROCEDURE — 0WJP7ZZ INSPECTION OF GASTROINTESTINAL TRACT, VIA NATURAL OR ARTIFICIAL OPENING APPROACH: ICD-10-PCS | Performed by: SURGERY

## 2020-10-05 PROCEDURE — 88300 SURGICAL PATH GROSS: CPT | Performed by: SURGERY

## 2020-10-05 PROCEDURE — 0JPT0WZ REMOVAL OF TOTALLY IMPLANTABLE VASCULAR ACCESS DEVICE FROM TRUNK SUBCUTANEOUS TISSUE AND FASCIA, OPEN APPROACH: ICD-10-PCS | Performed by: SURGERY

## 2020-10-05 RX ORDER — SCOLOPAMINE TRANSDERMAL SYSTEM 1 MG/1
1 PATCH, EXTENDED RELEASE TRANSDERMAL ONCE
Status: DISCONTINUED | OUTPATIENT
Start: 2020-10-05 | End: 2020-10-05

## 2020-10-05 RX ORDER — ONDANSETRON 2 MG/ML
4 INJECTION INTRAMUSCULAR; INTRAVENOUS AS NEEDED
Status: DISCONTINUED | OUTPATIENT
Start: 2020-10-05 | End: 2020-10-05

## 2020-10-05 RX ORDER — ONDANSETRON 2 MG/ML
INJECTION INTRAMUSCULAR; INTRAVENOUS AS NEEDED
Status: DISCONTINUED | OUTPATIENT
Start: 2020-10-05 | End: 2020-10-05 | Stop reason: SURG

## 2020-10-05 RX ORDER — HYDROCODONE BITARTRATE AND ACETAMINOPHEN 5; 325 MG/1; MG/1
1-2 TABLET ORAL EVERY 4 HOURS PRN
Qty: 5 TABLET | Refills: 0 | Status: SHIPPED | OUTPATIENT
Start: 2020-10-05 | End: 2020-10-21 | Stop reason: ALTCHOICE

## 2020-10-05 RX ORDER — DEXAMETHASONE SODIUM PHOSPHATE 4 MG/ML
VIAL (ML) INJECTION AS NEEDED
Status: DISCONTINUED | OUTPATIENT
Start: 2020-10-05 | End: 2020-10-05 | Stop reason: SURG

## 2020-10-05 RX ORDER — NALOXONE HYDROCHLORIDE 0.4 MG/ML
80 INJECTION, SOLUTION INTRAMUSCULAR; INTRAVENOUS; SUBCUTANEOUS AS NEEDED
Status: DISCONTINUED | OUTPATIENT
Start: 2020-10-05 | End: 2020-10-05

## 2020-10-05 RX ORDER — HEPARIN SODIUM 5000 [USP'U]/ML
5000 INJECTION, SOLUTION INTRAVENOUS; SUBCUTANEOUS ONCE
Status: COMPLETED | OUTPATIENT
Start: 2020-10-05 | End: 2020-10-05

## 2020-10-05 RX ORDER — LIDOCAINE HYDROCHLORIDE 10 MG/ML
INJECTION, SOLUTION EPIDURAL; INFILTRATION; INTRACAUDAL; PERINEURAL AS NEEDED
Status: DISCONTINUED | OUTPATIENT
Start: 2020-10-05 | End: 2020-10-05 | Stop reason: SURG

## 2020-10-05 RX ORDER — EPHEDRINE SULFATE 50 MG/ML
INJECTION, SOLUTION INTRAVENOUS AS NEEDED
Status: DISCONTINUED | OUTPATIENT
Start: 2020-10-05 | End: 2020-10-05 | Stop reason: SURG

## 2020-10-05 RX ORDER — ACETAMINOPHEN 500 MG
1000 TABLET ORAL ONCE
COMMUNITY
End: 2020-10-21 | Stop reason: ALTCHOICE

## 2020-10-05 RX ORDER — DIPHENHYDRAMINE HYDROCHLORIDE 50 MG/ML
INJECTION INTRAMUSCULAR; INTRAVENOUS AS NEEDED
Status: DISCONTINUED | OUTPATIENT
Start: 2020-10-05 | End: 2020-10-05 | Stop reason: SURG

## 2020-10-05 RX ORDER — MIDAZOLAM HYDROCHLORIDE 1 MG/ML
1 INJECTION INTRAMUSCULAR; INTRAVENOUS EVERY 5 MIN PRN
Status: DISCONTINUED | OUTPATIENT
Start: 2020-10-05 | End: 2020-10-05

## 2020-10-05 RX ORDER — HYDROMORPHONE HYDROCHLORIDE 1 MG/ML
0.4 INJECTION, SOLUTION INTRAMUSCULAR; INTRAVENOUS; SUBCUTANEOUS EVERY 5 MIN PRN
Status: DISCONTINUED | OUTPATIENT
Start: 2020-10-05 | End: 2020-10-05

## 2020-10-05 RX ORDER — SODIUM CHLORIDE, SODIUM LACTATE, POTASSIUM CHLORIDE, CALCIUM CHLORIDE 600; 310; 30; 20 MG/100ML; MG/100ML; MG/100ML; MG/100ML
INJECTION, SOLUTION INTRAVENOUS CONTINUOUS
Status: DISCONTINUED | OUTPATIENT
Start: 2020-10-05 | End: 2020-10-05

## 2020-10-05 RX ORDER — SCOLOPAMINE TRANSDERMAL SYSTEM 1 MG/1
PATCH, EXTENDED RELEASE TRANSDERMAL
Status: DISCONTINUED
Start: 2020-10-05 | End: 2020-10-05

## 2020-10-05 RX ORDER — MEPERIDINE HYDROCHLORIDE 25 MG/ML
12.5 INJECTION INTRAMUSCULAR; INTRAVENOUS; SUBCUTANEOUS AS NEEDED
Status: DISCONTINUED | OUTPATIENT
Start: 2020-10-05 | End: 2020-10-05

## 2020-10-05 RX ORDER — HYDROCODONE BITARTRATE AND ACETAMINOPHEN 5; 325 MG/1; MG/1
1 TABLET ORAL AS NEEDED
Status: DISCONTINUED | OUTPATIENT
Start: 2020-10-05 | End: 2020-10-05

## 2020-10-05 RX ORDER — ACETAMINOPHEN 500 MG
1000 TABLET ORAL ONCE AS NEEDED
Status: DISCONTINUED | OUTPATIENT
Start: 2020-10-05 | End: 2020-10-05

## 2020-10-05 RX ORDER — HYDROCODONE BITARTRATE AND ACETAMINOPHEN 5; 325 MG/1; MG/1
2 TABLET ORAL AS NEEDED
Status: DISCONTINUED | OUTPATIENT
Start: 2020-10-05 | End: 2020-10-05

## 2020-10-05 RX ORDER — BUPIVACAINE HYDROCHLORIDE 5 MG/ML
INJECTION, SOLUTION EPIDURAL; INTRACAUDAL AS NEEDED
Status: DISCONTINUED | OUTPATIENT
Start: 2020-10-05 | End: 2020-10-05 | Stop reason: HOSPADM

## 2020-10-05 RX ORDER — LIDOCAINE HYDROCHLORIDE AND EPINEPHRINE 10; 10 MG/ML; UG/ML
INJECTION, SOLUTION INFILTRATION; PERINEURAL AS NEEDED
Status: DISCONTINUED | OUTPATIENT
Start: 2020-10-05 | End: 2020-10-05 | Stop reason: HOSPADM

## 2020-10-05 RX ADMIN — SODIUM CHLORIDE, SODIUM LACTATE, POTASSIUM CHLORIDE, CALCIUM CHLORIDE: 600; 310; 30; 20 INJECTION, SOLUTION INTRAVENOUS at 15:08:00

## 2020-10-05 RX ADMIN — ONDANSETRON 4 MG: 2 INJECTION INTRAMUSCULAR; INTRAVENOUS at 15:40:00

## 2020-10-05 RX ADMIN — SODIUM CHLORIDE, SODIUM LACTATE, POTASSIUM CHLORIDE, CALCIUM CHLORIDE: 600; 310; 30; 20 INJECTION, SOLUTION INTRAVENOUS at 15:52:00

## 2020-10-05 RX ADMIN — DEXAMETHASONE SODIUM PHOSPHATE 8 MG: 4 MG/ML VIAL (ML) INJECTION at 15:15:00

## 2020-10-05 RX ADMIN — EPHEDRINE SULFATE 10 MG: 50 INJECTION, SOLUTION INTRAVENOUS at 15:38:00

## 2020-10-05 RX ADMIN — LIDOCAINE HYDROCHLORIDE 50 MG: 10 INJECTION, SOLUTION EPIDURAL; INFILTRATION; INTRACAUDAL; PERINEURAL at 15:11:00

## 2020-10-05 RX ADMIN — DIPHENHYDRAMINE HYDROCHLORIDE 25 MG: 50 INJECTION INTRAMUSCULAR; INTRAVENOUS at 15:17:00

## 2020-10-05 NOTE — H&P
Patient presents with anal squamous cell carcinoma diagnosed in 2013. Patient underwent radiation therapy. She presents for port removal.  She is also not had an anal examination for 2 years.   She denies rectal bleeding.         .                    Past mouth 2 (two) times daily. , Disp: 180 tablet, Rfl: 1  pregabalin 75 MG Oral Cap, Take 2 capsules (150 mg total) by mouth 2 (two) times daily. , Disp: 120 capsule, Rfl: 3  Levothyroxine Sodium (EUTHYROX) 75 MCG Oral Tab, Take 1 tablet (75 mcg total) by mouth sore throat; hearing loss negative,   EYES , no diplopia or vision changes  RESPIRATORY: denies shortness of breath, wheezing or cough   CARDIOVASCULAR: denies chest pain or COLLAZO; no palpitations   GI: denies nausea, vomiting, constipation, diarrhea; no rec may contain biotin concentrations greater than 150 times (5–10 mg) the RDA.   It is recommended that physicians ask all patients who may be on biotin supplementation to stop biotin consumption at least 72 hours prior to collection of a new sample.      • Gl • Neutrophil Absolute 09/15/2020 2.60  1.50 - 7.70 x10(3) uL Final   • Lymphocyte Absolute 09/15/2020 1.22  1.00 - 4.00 x10(3) uL Final   • Monocyte Absolute 09/15/2020 0.30  0.10 - 1.00 x10(3) uL Final   • Eosinophil Absolute 09/15/2020 0.11  0.00 - 0.7

## 2020-10-05 NOTE — ANESTHESIA PROCEDURE NOTES
Airway  Urgency: elective      General Information and Staff    Patient location during procedure: OR  Anesthesiologist: Chalo Mcneal MD  Performed: CRNA     Indications and Patient Condition  Indications for airway management: anesthesia  Sedation l

## 2020-10-05 NOTE — ANESTHESIA POSTPROCEDURE EVALUATION
[address] Patient Status:  Hospital Outpatient Surgery   Age/Gender 61year old female MRN [id number]   Location [address] [facility] Attending Debbi Squires DO   Hosp Day # 0 PCP Roderick Montelongo DO       Anesth

## 2020-10-05 NOTE — ANESTHESIA PREPROCEDURE EVALUATION
PRE-OP EVALUATION    Patient Name: João Reasons    Pre-op Diagnosis: Anal squamous cell carcinoma ([facility] [address] [C21.0]    Procedure(s):  ANAL EXAMINATION UNDER ANESTHESIA, REMOVAL OF PORT A CATHETER      Surgeon(s) and Role:     Jeffrey Aaron, DO - Primary [Escitalopram], Promethazine, Quetiapine Fumarate, Ropinirole, and Adhesive Tape      Anesthesia Evaluation    Patient summary reviewed.     Anesthetic Complications  (+) history of anesthetic complications  History of: PONV       GI/Hepatic/Renal    Negati 09/15/2020    HGB 12.7 09/15/2020    HCT 39.1 09/15/2020    MCV 92.2 09/15/2020    MCH 30.0 09/15/2020    MCHC 32.5 09/15/2020    RDW 13.6 09/15/2020    .0 09/15/2020     Lab Results   Component Value Date     09/15/2020    K 3.8 09/15/2020

## 2020-10-06 NOTE — OPERATIVE REPORT
[facility]    PATIENT'S NAME: Mark Ceja   ATTENDING PHYSICIAN: Ruddy Lomax D.O.   OPERATING PHYSICIAN: Ruddy Lomax D.O.   PATIENT ACCOUNT#:   [de-identified]    LOCATION:  [address] [place] [address] ED 10  MEDICAL RECORD #:   [id number]       JAMES cell carcinoma. The patient was extubated in the operative suite and transported to Recovery in stable condition.     Dictated By Cori Alston D.O.  d: 10/05/2020 15:54:37  t: 10/05/2020 18:59:07  [facility] [id number]  RM/    cc: NOLBERTO Jules

## 2020-10-21 ENCOUNTER — OFFICE VISIT (OUTPATIENT)
Dept: NEUROLOGY | Facility: CLINIC | Age: 60
End: 2020-10-21
Payer: MEDICARE

## 2020-10-21 ENCOUNTER — OFFICE VISIT (OUTPATIENT)
Dept: FAMILY MEDICINE CLINIC | Facility: CLINIC | Age: 60
End: 2020-10-21
Payer: MEDICARE

## 2020-10-21 VITALS
RESPIRATION RATE: 16 BRPM | HEART RATE: 72 BPM | BODY MASS INDEX: 30.62 KG/M2 | DIASTOLIC BLOOD PRESSURE: 82 MMHG | TEMPERATURE: 97 F | SYSTOLIC BLOOD PRESSURE: 126 MMHG | OXYGEN SATURATION: 97 % | WEIGHT: 175 LBS | HEIGHT: 63.5 IN

## 2020-10-21 VITALS
DIASTOLIC BLOOD PRESSURE: 72 MMHG | HEIGHT: 63.5 IN | HEART RATE: 76 BPM | BODY MASS INDEX: 30.8 KG/M2 | RESPIRATION RATE: 15 BRPM | WEIGHT: 176 LBS | SYSTOLIC BLOOD PRESSURE: 106 MMHG

## 2020-10-21 DIAGNOSIS — Z00.00 ANNUAL PHYSICAL EXAM: ICD-10-CM

## 2020-10-21 DIAGNOSIS — M79.7 FIBROMYALGIA: ICD-10-CM

## 2020-10-21 DIAGNOSIS — G25.81 RESTLESS LEG SYNDROME: Primary | ICD-10-CM

## 2020-10-21 DIAGNOSIS — Z12.31 BREAST CANCER SCREENING BY MAMMOGRAM: Primary | ICD-10-CM

## 2020-10-21 PROCEDURE — 99396 PREV VISIT EST AGE 40-64: CPT | Performed by: FAMILY MEDICINE

## 2020-10-21 PROCEDURE — 3079F DIAST BP 80-89 MM HG: CPT | Performed by: FAMILY MEDICINE

## 2020-10-21 PROCEDURE — 3078F DIAST BP <80 MM HG: CPT | Performed by: OTHER

## 2020-10-21 PROCEDURE — 3074F SYST BP LT 130 MM HG: CPT | Performed by: FAMILY MEDICINE

## 2020-10-21 PROCEDURE — 99213 OFFICE O/P EST LOW 20 MIN: CPT | Performed by: OTHER

## 2020-10-21 PROCEDURE — 3008F BODY MASS INDEX DOCD: CPT | Performed by: FAMILY MEDICINE

## 2020-10-21 PROCEDURE — 96160 PT-FOCUSED HLTH RISK ASSMT: CPT | Performed by: FAMILY MEDICINE

## 2020-10-21 PROCEDURE — G0439 PPPS, SUBSEQ VISIT: HCPCS | Performed by: FAMILY MEDICINE

## 2020-10-21 PROCEDURE — 3008F BODY MASS INDEX DOCD: CPT | Performed by: OTHER

## 2020-10-21 PROCEDURE — 3074F SYST BP LT 130 MM HG: CPT | Performed by: OTHER

## 2020-10-21 RX ORDER — PREGABALIN 75 MG/1
150 CAPSULE ORAL 2 TIMES DAILY
Qty: 120 CAPSULE | Refills: 3 | Status: SHIPPED | OUTPATIENT
Start: 2020-10-21 | End: 2020-12-15

## 2020-10-21 RX ORDER — LEVOTHYROXINE SODIUM 0.07 MG/1
75 TABLET ORAL
Qty: 90 TABLET | Refills: 0 | Status: SHIPPED | OUTPATIENT
Start: 2020-10-21 | End: 2021-01-18

## 2020-10-21 RX ORDER — PRAMIPEXOLE DIHYDROCHLORIDE 0.12 MG/1
0.12 TABLET ORAL NIGHTLY
Qty: 30 TABLET | Refills: 1 | Status: SHIPPED | OUTPATIENT
Start: 2020-10-21 | End: 2020-12-15

## 2020-10-21 NOTE — PATIENT INSTRUCTIONS
After your visit at the [facility] office  today,  please direct any follow up questions or medication needs to the staff in our [place] office so that your concerns may be promptly addressed.   We are available through [email or web address] or at the numbers below: be picked up in office. • Please allow the office 2-3 business days to fill the prescription. • Patient must present photo ID at time of . PLEASE NOTE: PRESCRIPTIONS MUST BE PICKED UP PRIOR TO 3:00PM MONDAY-FRIDAY    Scheduling Tests:     If your submitting forms to office staff. • Form completion may require an additional fee. • A signed Release of Information (TERRENCE) must be on file before forms may be submitted. When dropping off forms, please ask the  for this paper.    • Failure

## 2020-10-21 NOTE — PROGRESS NOTES
HPI:   Mark Kaur is a 61year old female who presents for an annual wellness exam.  No concerns today    PAP: done with gyne  Mammo: due  Colonoscopy: UTD    Specialists:  Sees Dr. Zack Wilson for [address] [place] [address] Weight loss clinic.  They prescribe to score of 0 so is at low risk.     Patient Care Team: Patient Care Team:  Nisha Perez MD as PCP - General (Family Medicine)  Miller García MD ([facility])  Alecia Brian, [address] [place] [address] (Dietitian)  CLAUDIA Foy (Nurse Practitioner)  Annette Rayo 2 tablets (1,500 mg total) by mouth daily with food. (Patient taking differently: Take 1,500 mg by mouth daily with food.  Weight loss )    •  Albuterol Sulfate HFA (PROAIR HFA) 108 (90 Base) MCG/ACT Inhalation Aero Soln, Inhale 2 puffs into the lungs every regular bowel movements. No blood in stool. : denies dysuria.  No vag bleeding  NEURO: denies headaches or dizziness  PSYCHE: denies depression or anxiety      EXAM:   /82   Pulse 72   Temp 96.8 °F (36 °C) (Temporal)   Resp 16   Ht 63.5\"   Wt 175 screening    - [facility] SCREENING BILAT (CPT=77067); Future    2. Hypothyroidism (acquired)  Controlled    3. Asthma with COPD (chronic obstructive pulmonary disease) ([facility] [address]      4.  [facility]  Continue active surveillance with Dr. Lizbeth Ashley and Dr. Jonah Carey Date Value   09/15/2020 87          Cardiovascular Disease Screening     LDL Annually LDL Cholesterol (mg/dL)   Date Value   03/22/2019 206 (H)        EKG - w/ Initial Preventative Physical Exam only, or if medically necessary Electrocardiogram date Homosexual men   Illicit injectable drug abusers     Tetanus Toxoid  Only covered with a cut with metal- TD and TDaP Not covered by Medicare Part B No vaccine history found This may be covered with your prescription benefits, but Medicare does not cover

## 2020-10-21 NOTE — PROGRESS NOTES
Neurology H&P    Charlie Fuller Patient Status:  No patient class for patient encounter    1960 MRN [id number]   Location [address] [place] [address], [address] [place] [address] Drive, [address] [place] [address] Attending No att. providers found   [facility] Day # 0 PCP MD Domenica Saunders topiramate 100 MG Oral Tab Take 1 tablet (100 mg total) by mouth 2 (two) times daily. 180 tablet 1   • pregabalin 75 MG Oral Cap Take 2 capsules (150 mg total) by mouth 2 (two) times daily.  120 capsule 3   • Levothyroxine Sodium (EUTHYROX) 75 MCG Oral Tab chemotherapy      last dose 2/2014, patient also completed radiation therapy 2014   • PONV (postoperative nausea and vomiting)    • Restless leg syndrome    • Visual impairment     glasses,contacts       PSHx:  Past Surgical History:   Procedure Laterality Exam:    Vital Signs:  Height 63.5\", weight 176 lb (79.8 kg), not currently breastfeeding.     Gen: Awake and in no apparent distress  HEENT: moist mucus membranes  Neck: Supple  Cardiovascular: Regular rate and rhythm, no murmur  Pulm: CTAB  GI: non-tende past    RTC 3 months  Benjamin Corley, DO  Neurology

## 2020-10-28 ENCOUNTER — TELEPHONE (OUTPATIENT)
Dept: FAMILY MEDICINE CLINIC | Facility: CLINIC | Age: 60
End: 2020-10-28

## 2020-10-28 DIAGNOSIS — M79.7 FIBROMYALGIA: Primary | ICD-10-CM

## 2020-10-28 NOTE — TELEPHONE ENCOUNTER
Called the referral dept. They will obtain a new authorization and send me a message when it is complete.

## 2020-10-28 NOTE — TELEPHONE ENCOUNTER
Spoke with the referral department to change the provider. Per the referral department, Dr. Meryl Montilla is a Physiatrist, not [facility]. Called pt and per pt, this is the only provider in their office that deals with Fibromyalgia. New referral placed.   Donya Santana

## 2020-10-28 NOTE — TELEPHONE ENCOUNTER
Pt called back and said she wants to see Dr. Pia Garcia Louder.   Message sent to Ashish Hall to be sure she does not cancel the [facility] referral.

## 2020-11-18 ENCOUNTER — OFFICE VISIT (OUTPATIENT)
Dept: INTERNAL MEDICINE CLINIC | Facility: CLINIC | Age: 60
End: 2020-11-18
Payer: MEDICARE

## 2020-11-18 VITALS
HEART RATE: 68 BPM | WEIGHT: 174 LBS | DIASTOLIC BLOOD PRESSURE: 78 MMHG | RESPIRATION RATE: 14 BRPM | HEIGHT: 63.5 IN | SYSTOLIC BLOOD PRESSURE: 126 MMHG | BODY MASS INDEX: 30.45 KG/M2

## 2020-11-18 DIAGNOSIS — E66.01 SEVERE OBESITY (HCC): ICD-10-CM

## 2020-11-18 DIAGNOSIS — Z51.81 ENCOUNTER FOR THERAPEUTIC DRUG MONITORING: Primary | ICD-10-CM

## 2020-11-18 DIAGNOSIS — M79.7 FIBROMYALGIA: ICD-10-CM

## 2020-11-18 DIAGNOSIS — E66.9 OBESITY (BMI 30-39.9): ICD-10-CM

## 2020-11-18 PROCEDURE — 3078F DIAST BP <80 MM HG: CPT | Performed by: NURSE PRACTITIONER

## 2020-11-18 PROCEDURE — 99214 OFFICE O/P EST MOD 30 MIN: CPT | Performed by: NURSE PRACTITIONER

## 2020-11-18 PROCEDURE — 3008F BODY MASS INDEX DOCD: CPT | Performed by: NURSE PRACTITIONER

## 2020-11-18 PROCEDURE — 3074F SYST BP LT 130 MM HG: CPT | Performed by: NURSE PRACTITIONER

## 2020-11-18 RX ORDER — METFORMIN HYDROCHLORIDE 750 MG/1
1500 TABLET, EXTENDED RELEASE ORAL
Qty: 180 TABLET | Refills: 1 | Status: SHIPPED | OUTPATIENT
Start: 2020-11-18 | End: 2021-06-16

## 2020-11-18 NOTE — PATIENT INSTRUCTIONS
Continue making lifestyle changes that focus on good nutrition, regular exercise and stress management. Medication Plan: Continue current medication regimen.     Next steps to work on before next office visit include: I recommend to trial a dairy and glu to go @ www.[employer]. [employer]  · Diet Doctor @ www. dietdoctor. com - low carb swaps    Stress Management/Behavior/Mindful Eating  · CALM meditation boyd  · Headspace  · Am I Hungry? Mindful eating virtual  boyd  · Www.yourweightmatters. org - Obesity Action

## 2020-11-19 NOTE — TELEPHONE ENCOUNTER
----- Message from MinoMonsters sent at 9/29/2020 11:57 AM CDT -----  Pt lvm stated she is returning a call from the other day. She works from [employer] so she may answer or not. Consent 3/Introductory Paragraph: I gave the patient a chance to ask questions they had about the procedure.  Following this I explained the Mohs procedure and consent was obtained. The risks, benefits and alternatives to therapy were discussed in detail. Specifically, the risks of infection, scarring, bleeding, prolonged wound healing, incomplete removal, allergy to anesthesia, nerve injury and recurrence were addressed. Prior to the procedure, the treatment site was clearly identified and confirmed by the patient. All components of Universal Protocol/PAUSE Rule completed.

## 2020-11-20 ENCOUNTER — TELEPHONE (OUTPATIENT)
Dept: FAMILY MEDICINE CLINIC | Facility: CLINIC | Age: 60
End: 2020-11-20

## 2020-11-30 PROBLEM — M54.50 LOW BACK PAIN AT MULTIPLE SITES: Status: ACTIVE | Noted: 2020-11-30

## 2020-11-30 PROBLEM — M79.10 MYALGIA: Status: ACTIVE | Noted: 2020-11-30

## 2020-11-30 PROBLEM — M54.2 NECK PAIN: Status: ACTIVE | Noted: 2020-11-30

## 2020-11-30 PROBLEM — M79.7 FIBROMYALGIA: Status: ACTIVE | Noted: 2020-11-30

## 2020-11-30 PROBLEM — G89.29 OTHER CHRONIC PAIN: Status: ACTIVE | Noted: 2020-11-30

## 2020-12-15 ENCOUNTER — OFFICE VISIT (OUTPATIENT)
Dept: NEUROLOGY | Facility: CLINIC | Age: 60
End: 2020-12-15
Payer: MEDICARE

## 2020-12-15 VITALS
HEIGHT: 63.5 IN | RESPIRATION RATE: 15 BRPM | WEIGHT: 174 LBS | SYSTOLIC BLOOD PRESSURE: 116 MMHG | BODY MASS INDEX: 30.45 KG/M2 | HEART RATE: 76 BPM | DIASTOLIC BLOOD PRESSURE: 62 MMHG

## 2020-12-15 DIAGNOSIS — R20.2 PARESTHESIAS: ICD-10-CM

## 2020-12-15 DIAGNOSIS — G25.81 RESTLESS LEG SYNDROME: Primary | ICD-10-CM

## 2020-12-15 PROCEDURE — 3008F BODY MASS INDEX DOCD: CPT | Performed by: OTHER

## 2020-12-15 PROCEDURE — 3078F DIAST BP <80 MM HG: CPT | Performed by: OTHER

## 2020-12-15 PROCEDURE — 3074F SYST BP LT 130 MM HG: CPT | Performed by: OTHER

## 2020-12-15 PROCEDURE — 99213 OFFICE O/P EST LOW 20 MIN: CPT | Performed by: OTHER

## 2020-12-15 RX ORDER — PRAMIPEXOLE DIHYDROCHLORIDE 0.12 MG/1
0.12 TABLET ORAL NIGHTLY
Qty: 90 TABLET | Refills: 1 | Status: SHIPPED | OUTPATIENT
Start: 2020-12-15 | End: 2021-04-21

## 2020-12-15 RX ORDER — PREGABALIN 75 MG/1
150 CAPSULE ORAL 2 TIMES DAILY
Qty: 120 CAPSULE | Refills: 3 | Status: SHIPPED | OUTPATIENT
Start: 2020-12-15 | End: 2021-04-21

## 2020-12-15 RX ORDER — ACETAMINOPHEN 160 MG
2000 TABLET,DISINTEGRATING ORAL DAILY
COMMUNITY

## 2020-12-15 NOTE — PATIENT INSTRUCTIONS
After your visit at the [facility] office  today,  please direct any follow up questions or medication needs to the staff in our [place] office so that your concerns may be promptly addressed.   We are available through [employer] or at the numbers below: be picked up in office. • Please allow the office 2-3 business days to fill the prescription. • Patient must present photo ID at time of . PLEASE NOTE: PRESCRIPTIONS MUST BE PICKED UP PRIOR TO 3:00PM MONDAY-FRIDAY    Scheduling Tests:     If your submitting forms to office staff. • Form completion may require an additional fee. • A signed Release of Information (TERRENCE) must be on file before forms may be submitted. When dropping off forms, please ask the  for this paper.    • Failure

## 2020-12-15 NOTE — PROGRESS NOTES
Neurology H&P    Sheri Conley Patient Status:  No patient class for patient encounter    1960 MRN [id number]   Location [facility], [address] [place] [address] Drive, [address] [place] [address] Attending No att. providers found   [facility] Day # 0 PCP MD Alta Lay mouth nightly. 30 tablet 1   • pregabalin 75 MG Oral Cap Take 2 capsules (150 mg total) by mouth 2 (two) times daily.  120 capsule 3   • Levothyroxine Sodium (EUTHYROX) 75 MCG Oral Tab Take 1 tablet (75 mcg total) by mouth every morning before breakfast. 90 impairment     glasses,contacts       PSHx:  Past Surgical History:   Procedure Laterality Date   •      • CATHETER REMOVAL Right 10/5/2020    Performed by Huang Narvaez DO at [address] [facility] [address]   • COLONOSCOPY N/A 11/10/2017    Performed by Docia Hammans rhythm, no murmur  Pulm: CTAB  GI: non-tender, normal bowel sounds  Skin: normal, dry  Extremities: No clubbing or cyanosis      Neurologic:   MENTAL STATUS: alert, ox3, normal attention, language and fund of knowledge.       CRANIAL NERVES II to XII: PERRL

## 2021-01-08 ENCOUNTER — TELEPHONE (OUTPATIENT)
Dept: NEUROLOGY | Facility: CLINIC | Age: 61
End: 2021-01-08

## 2021-01-11 NOTE — TELEPHONE ENCOUNTER
Per PSR:    pt had a terrible fall, she really hurt her wrist, should she put off the EMG? She is okay to put it off a bit longer    Will route to provider for advisement.

## 2021-01-11 NOTE — TELEPHONE ENCOUNTER
Its ok with me if she wants to put it off a bit longer. The fall and pain itself will not necessarily effect the test, but the test can be rather uncomfortable.  If she would like to schedule this for a later date this is ok

## 2021-01-18 RX ORDER — LEVOTHYROXINE SODIUM 0.07 MG/1
TABLET ORAL
Qty: 90 TABLET | Refills: 0 | Status: SHIPPED | OUTPATIENT
Start: 2021-01-18 | End: 2021-05-11

## 2021-01-18 NOTE — TELEPHONE ENCOUNTER
Thyroid Supplements Protocol [id number]/17/2021 02:23 PM   TSH test in past 12 months Protocol Details    TSH value between 0.350 and 5.500 IU/ml           Last OV 10/21/20   Last TSH 2.400 on 9/15/20  Last refill 10/21/20 #90 0 refill

## 2021-01-28 ENCOUNTER — TELEPHONE (OUTPATIENT)
Dept: FAMILY MEDICINE CLINIC | Facility: CLINIC | Age: 61
End: 2021-01-28

## 2021-01-28 NOTE — TELEPHONE ENCOUNTER
Pt fell on 1/4/21,  Pt landed on her wrists, states there was no swelling or bruising, has been wearing a brace on the wrists, but the wrists are still painful, can feel pain all the way to her elbow on both wrists.    Pt wants to know if dr Kisha Jackson will order

## 2021-01-28 NOTE — TELEPHONE ENCOUNTER
Patient fell 1/4/21  Right wrist still very painful  Has been wearing a brace since   Patient advised x -ray would be determined by Dr. Stephanie Ewing at her apt.     Future Appointments   Date Time Provider Marie Cerna   1/29/2021  9:15 AM MD JASON Atkins

## 2021-01-29 ENCOUNTER — TELEPHONE (OUTPATIENT)
Dept: FAMILY MEDICINE CLINIC | Facility: CLINIC | Age: 61
End: 2021-01-29

## 2021-01-29 ENCOUNTER — HOSPITAL ENCOUNTER (OUTPATIENT)
Dept: GENERAL RADIOLOGY | Age: 61
Discharge: HOME | End: 2021-01-29
Attending: FAMILY MEDICINE
Payer: MEDICARE

## 2021-01-29 ENCOUNTER — OFFICE VISIT (OUTPATIENT)
Dept: FAMILY MEDICINE CLINIC | Facility: CLINIC | Age: 61
End: 2021-01-29
Payer: MEDICARE

## 2021-01-29 VITALS
RESPIRATION RATE: 14 BRPM | WEIGHT: 170 LBS | OXYGEN SATURATION: 98 % | HEART RATE: 70 BPM | SYSTOLIC BLOOD PRESSURE: 122 MMHG | TEMPERATURE: 98 F | HEIGHT: 63.5 IN | BODY MASS INDEX: 29.75 KG/M2 | DIASTOLIC BLOOD PRESSURE: 80 MMHG

## 2021-01-29 DIAGNOSIS — M25.531 RIGHT WRIST PAIN: Primary | ICD-10-CM

## 2021-01-29 DIAGNOSIS — M25.531 RIGHT WRIST PAIN: ICD-10-CM

## 2021-01-29 DIAGNOSIS — S63.509D SPRAIN OF WRIST, UNSPECIFIED LATERALITY, SUBSEQUENT ENCOUNTER: ICD-10-CM

## 2021-01-29 PROCEDURE — 3008F BODY MASS INDEX DOCD: CPT | Performed by: FAMILY MEDICINE

## 2021-01-29 PROCEDURE — 73110 X-RAY EXAM OF WRIST: CPT | Performed by: FAMILY MEDICINE

## 2021-01-29 PROCEDURE — 99214 OFFICE O/P EST MOD 30 MIN: CPT | Performed by: FAMILY MEDICINE

## 2021-01-29 PROCEDURE — 3079F DIAST BP 80-89 MM HG: CPT | Performed by: FAMILY MEDICINE

## 2021-01-29 PROCEDURE — 3074F SYST BP LT 130 MM HG: CPT | Performed by: FAMILY MEDICINE

## 2021-01-29 RX ORDER — NAPROXEN 500 MG/1
TABLET ORAL
Qty: 20 TABLET | Refills: 0 | Status: SHIPPED | OUTPATIENT
Start: 2021-01-29 | End: 2021-03-02

## 2021-01-29 NOTE — TELEPHONE ENCOUNTER
Pt would like to have a hard brace for her wrist and was wondering if Dr Evette Wheeler could order it so it could be covered by insurance.

## 2021-01-29 NOTE — TELEPHONE ENCOUNTER
----- Message from Chip Foster MD sent at 1/29/2021  3:13 PM CST -----  Some arthritis. No fracture or dislocation. Tx as sprain. RICE.   Will refer to ortho if not better in 2 weeks

## 2021-01-29 NOTE — PROGRESS NOTES
Patient presents with:  Wrist Pain: RT x 3 wks       HPI:    Patient ID: Bakari Connolly is a 64year old female c/o  wrist pain after fall on 1/4/21. Foot got caught in a rug and fell onto both outstretched hands onto wooden floor.  Both wrists painful but RASH    Comment:Need to use surgery glue    HISTORY:  Past Medical History:   Diagnosis Date   • Anal cancer ([facility] [address] 2013   • Anal cancer ([facility] [address] 2013   • Anesthesia complication     slow to arouse   • Anxiety state    • Asthma     excerise induced   • Back pr lung   • Other (anuyrsem ) Mother    • Thyroid disease Sister         Hypothyroidism on [place] thyroid      Social History: Social History    Tobacco Use      Smoking status: Never Smoker      Smokeless tobacco: Never Used      Tobacco comment: non-smok

## 2021-02-01 NOTE — PROGRESS NOTES
Nuvia Frost is a 64year old female presents today for follow-up on medical weight loss program for the treatment of overweight, obesity, or morbid obesity.     S:  Current weight Wt Readings from Last 6 Encounters:  02/02/21 : 178 lb (80.7 kg)  01/29/2 EYES: conjunctiva pink, sclera non icteric, PERRLA  LUNGS: CTA in all fields, breathing non labored  CARDIO: RRR without murmur, normal S1 and S2 without clicks or gallops, no pedal edema.   GI: +BS  NEURO/MS: motor and sensory grossly intact  PSYCH: pleasa New year, new YOU! Set your intentions for health and wellness in the new year. Get back to the basics. Remind yourself of the 4 Pillars of Health (Sleep, stress, fitness and nutrition). Actively be concious and aware of your choices.  Listen to the Juliane Steele 87 Eating a healthier diet is also about more than just weight-loss.  Nutritious foods fuel your brain and body so they can perform their best. When cleaning up your diet, meal prepping and cooking new dishes, keep in mind all the ways you are benefiting your These foods are okay in moderation, but they can be enjoyed outside of the house in a fun and empowering way. Go through your kitchen fridge, freezer, pantry, cabinets and counters to toss out foods that aren't helping you meet your health goals.   2 – Put When you are hungry and in a pinch, it's important to have healthy foods on-hand so you don't make hunger-controlled choices.  This means keeping ready-to-go-snack foods in your kitchen such as fruit paired with nut butter, hummus and carrots, lunch meat an

## 2021-02-02 ENCOUNTER — OFFICE VISIT (OUTPATIENT)
Dept: INTERNAL MEDICINE CLINIC | Facility: CLINIC | Age: 61
End: 2021-02-02
Payer: MEDICARE

## 2021-02-02 VITALS
WEIGHT: 178 LBS | DIASTOLIC BLOOD PRESSURE: 78 MMHG | BODY MASS INDEX: 31.15 KG/M2 | HEART RATE: 72 BPM | SYSTOLIC BLOOD PRESSURE: 128 MMHG | HEIGHT: 63.5 IN

## 2021-02-02 DIAGNOSIS — M79.7 FIBROMYALGIA: ICD-10-CM

## 2021-02-02 DIAGNOSIS — E66.01 SEVERE OBESITY (HCC): ICD-10-CM

## 2021-02-02 DIAGNOSIS — Z51.81 ENCOUNTER FOR THERAPEUTIC DRUG MONITORING: Primary | ICD-10-CM

## 2021-02-02 DIAGNOSIS — E66.9 OBESITY (BMI 30-39.9): ICD-10-CM

## 2021-02-02 PROCEDURE — 3078F DIAST BP <80 MM HG: CPT | Performed by: NURSE PRACTITIONER

## 2021-02-02 PROCEDURE — 3074F SYST BP LT 130 MM HG: CPT | Performed by: NURSE PRACTITIONER

## 2021-02-02 PROCEDURE — 99214 OFFICE O/P EST MOD 30 MIN: CPT | Performed by: NURSE PRACTITIONER

## 2021-02-02 PROCEDURE — 3008F BODY MASS INDEX DOCD: CPT | Performed by: NURSE PRACTITIONER

## 2021-02-02 RX ORDER — TOPIRAMATE 100 MG/1
100 TABLET, FILM COATED ORAL 2 TIMES DAILY
Qty: 180 TABLET | Refills: 1 | Status: SHIPPED | OUTPATIENT
Start: 2021-02-02 | End: 2022-01-19 | Stop reason: ALTCHOICE

## 2021-02-02 NOTE — PATIENT INSTRUCTIONS
Continue making lifestyle changes that focus on good nutrition, regular exercise and stress management. Medication Plan: Continue current medication regimen.     Next steps to work on before next office visit include: Plenity, everylywell, genopalate well-being, not a number on the scale. The Centers for Disease Control recommends at least 150 minutes of moderate-intensity aerobic activity every week, plus muscle-strengthening activities at least two days a week.   3 – And Also Functional Nutrition  Ea your health goals. Here are a few steps you can take to set up your home for success.   How to Build a [place] of Less-healthy Food  These foods are okay in moderation, but they can be enjoyed outside of the house in a means keeping ready-to-go-snack foods in your kitchen such as fruit paired with nut butter, hummus and carrots, lunch meat and cheese roll-ups, etc. It may also help to keep pre-made freezer meals on-hand (something you've cooked in advance) for busy weekn

## 2021-02-08 ENCOUNTER — HOSPITAL ENCOUNTER (OUTPATIENT)
Dept: MAMMOGRAPHY | Age: 61
Discharge: HOME | End: 2021-02-08
Attending: FAMILY MEDICINE
Payer: MEDICARE

## 2021-02-08 ENCOUNTER — HOSPITAL ENCOUNTER (OUTPATIENT)
Dept: ULTRASOUND IMAGING | Age: 61
Discharge: HOME | End: 2021-02-08
Attending: UROLOGY
Payer: MEDICARE

## 2021-02-08 ENCOUNTER — LAB ENCOUNTER (OUTPATIENT)
Dept: LAB | Age: 61
End: 2021-02-08
Attending: FAMILY MEDICINE
Payer: MEDICARE

## 2021-02-08 DIAGNOSIS — Z00.00 ANNUAL PHYSICAL EXAM: ICD-10-CM

## 2021-02-08 DIAGNOSIS — Z12.31 BREAST CANCER SCREENING BY MAMMOGRAM: ICD-10-CM

## 2021-02-08 DIAGNOSIS — N28.1 KIDNEY CYST, ACQUIRED: ICD-10-CM

## 2021-02-08 LAB
CHOLEST SMN-MCNC: 253 MG/DL (ref ?–200)
EST. AVERAGE GLUCOSE BLD GHB EST-MCNC: 97 MG/DL (ref 68–126)
HBA1C MFR BLD HPLC: 5 % (ref ?–5.7)
HDLC SERPL-MCNC: 48 MG/DL (ref 40–59)
LDLC SERPL CALC-MCNC: 160 MG/DL (ref ?–100)
NONHDLC SERPL-MCNC: 205 MG/DL (ref ?–130)
PATIENT FASTING Y/N/NP: YES
TRIGL SERPL-MCNC: 225 MG/DL (ref 30–149)
VLDLC SERPL CALC-MCNC: 45 MG/DL (ref 0–30)

## 2021-02-08 PROCEDURE — 36415 COLL VENOUS BLD VENIPUNCTURE: CPT

## 2021-02-08 PROCEDURE — 80061 LIPID PANEL: CPT

## 2021-02-08 PROCEDURE — 83036 HEMOGLOBIN GLYCOSYLATED A1C: CPT

## 2021-02-08 PROCEDURE — 77063 BREAST TOMOSYNTHESIS BI: CPT | Performed by: FAMILY MEDICINE

## 2021-02-08 PROCEDURE — 77067 SCR MAMMO BI INCL CAD: CPT | Performed by: FAMILY MEDICINE

## 2021-02-08 PROCEDURE — 76775 US EXAM ABDO BACK WALL LIM: CPT | Performed by: UROLOGY

## 2021-02-09 ENCOUNTER — TELEPHONE (OUTPATIENT)
Dept: FAMILY MEDICINE CLINIC | Facility: CLINIC | Age: 61
End: 2021-02-09

## 2021-02-09 NOTE — TELEPHONE ENCOUNTER
Patient advised. Verbalized understanding. States that she doesn't want to try zocar at this time wants to try diet and exercise and recheck in 3 months.

## 2021-02-09 NOTE — TELEPHONE ENCOUNTER
----- Message from Zuleika Golden MD sent at 2/8/2021 11:29 PM CST -----  Lipids are high. Rec to start chol lowering medication. If agreeable, will start zocor.  HbA1c is nl

## 2021-02-28 DIAGNOSIS — Z51.81 ENCOUNTER FOR THERAPEUTIC DRUG MONITORING: ICD-10-CM

## 2021-02-28 DIAGNOSIS — E66.9 OBESITY (BMI 30-39.9): ICD-10-CM

## 2021-02-28 DIAGNOSIS — E66.01 SEVERE OBESITY (HCC): ICD-10-CM

## 2021-03-01 RX ORDER — METFORMIN HYDROCHLORIDE 750 MG/1
TABLET, EXTENDED RELEASE ORAL
Qty: 180 TABLET | Refills: 0 | OUTPATIENT
Start: 2021-03-01

## 2021-03-01 NOTE — TELEPHONE ENCOUNTER
Requesting metformin Er 750 mg  LOV: 2/2/21  RTC: 3 months  Last Relevant Labs: 2/8/21  Filled: 11/18/20 #180 with 1 refills    6 Month Rx confirmed receipt at pharmacy requesting -denied with note as such.  Would be due in May

## 2021-03-02 ENCOUNTER — OFFICE VISIT (OUTPATIENT)
Dept: OBGYN CLINIC | Facility: CLINIC | Age: 61
End: 2021-03-02
Payer: MEDICARE

## 2021-03-02 VITALS
DIASTOLIC BLOOD PRESSURE: 86 MMHG | HEIGHT: 63.5 IN | BODY MASS INDEX: 31.15 KG/M2 | SYSTOLIC BLOOD PRESSURE: 130 MMHG | WEIGHT: 178 LBS

## 2021-03-02 DIAGNOSIS — Z12.4 CERVICAL CANCER SCREENING: ICD-10-CM

## 2021-03-02 DIAGNOSIS — N95.2 POST-MENOPAUSAL ATROPHIC VAGINITIS: ICD-10-CM

## 2021-03-02 DIAGNOSIS — Z85.048 HISTORY OF ANAL CANCER: ICD-10-CM

## 2021-03-02 DIAGNOSIS — Z01.419 WELL WOMAN EXAM WITH ROUTINE GYNECOLOGICAL EXAM: Primary | ICD-10-CM

## 2021-03-02 PROCEDURE — 88175 CYTOPATH C/V AUTO FLUID REDO: CPT | Performed by: OBSTETRICS & GYNECOLOGY

## 2021-03-02 PROCEDURE — 3075F SYST BP GE 130 - 139MM HG: CPT | Performed by: OBSTETRICS & GYNECOLOGY

## 2021-03-02 PROCEDURE — 3008F BODY MASS INDEX DOCD: CPT | Performed by: OBSTETRICS & GYNECOLOGY

## 2021-03-02 PROCEDURE — G0101 CA SCREEN;PELVIC/BREAST EXAM: HCPCS | Performed by: OBSTETRICS & GYNECOLOGY

## 2021-03-02 PROCEDURE — 87624 HPV HI-RISK TYP POOLED RSLT: CPT | Performed by: OBSTETRICS & GYNECOLOGY

## 2021-03-02 PROCEDURE — 3079F DIAST BP 80-89 MM HG: CPT | Performed by: OBSTETRICS & GYNECOLOGY

## 2021-03-02 RX ORDER — ESTRADIOL 0.1 MG/G
1 CREAM VAGINAL
Qty: 1 TUBE | Refills: 3 | Status: SHIPPED | OUTPATIENT
Start: 2021-03-04

## 2021-03-02 NOTE — PROGRESS NOTES
OB/GYN H&P       CC: Patient presents with:  Physical: NP      HPI: Bonny Moser is a 64year old  here for well woman exam  She has a history of anal cancer treated with chemo and radiation, in remission since . She comes in for Pap smear. glasses,contacts     Past Surgical History:   Procedure Laterality Date   •      • CATHETER REMOVAL Right 10/5/2020    Performed by Terrie Gauthier DO at [address] [facility]   • COLONOSCOPY N/A 11/10/2017    Performed by Terrie Gauthier DO at [facility] ENDOSCOPY mouth daily. , Disp: , Rfl:     •  pregabalin 75 MG Oral Cap, Take 2 capsules (150 mg total) by mouth 2 (two) times daily. , Disp: 120 capsule, Rfl: 3    •  Pramipexole Dihydrochloride 0.125 MG Oral Tab, Take 1 tablet (0.125 mg total) by mouth nightly., Disp Left adnexum displays no mass and no tenderness. Genitourinary Comments: Normal external genitalia  Patent vaginal opening mild tenderness with opening of the speculum. Cervix appeared normal no masses noted in the vagina.   Adnexa normal bilaterally

## 2021-03-03 ENCOUNTER — PATIENT OUTREACH (OUTPATIENT)
Dept: FAMILY MEDICINE CLINIC | Facility: CLINIC | Age: 61
End: 2021-03-03

## 2021-03-10 LAB — HPV I/H RISK 1 DNA SPEC QL NAA+PROBE: NEGATIVE

## 2021-03-17 DIAGNOSIS — Z23 NEED FOR VACCINATION: ICD-10-CM

## 2021-04-12 ENCOUNTER — PATIENT OUTREACH (OUTPATIENT)
Dept: FAMILY MEDICINE CLINIC | Facility: CLINIC | Age: 61
End: 2021-04-12

## 2021-04-21 ENCOUNTER — OFFICE VISIT (OUTPATIENT)
Dept: NEUROLOGY | Facility: CLINIC | Age: 61
End: 2021-04-21
Payer: MEDICARE

## 2021-04-21 ENCOUNTER — TELEPHONE (OUTPATIENT)
Dept: NEUROLOGY | Facility: CLINIC | Age: 61
End: 2021-04-21

## 2021-04-21 VITALS
DIASTOLIC BLOOD PRESSURE: 76 MMHG | RESPIRATION RATE: 16 BRPM | HEART RATE: 76 BPM | WEIGHT: 186 LBS | BODY MASS INDEX: 32 KG/M2 | SYSTOLIC BLOOD PRESSURE: 124 MMHG

## 2021-04-21 DIAGNOSIS — G25.81 RESTLESS LEG SYNDROME: Primary | ICD-10-CM

## 2021-04-21 DIAGNOSIS — R20.2 PARESTHESIAS: ICD-10-CM

## 2021-04-21 PROCEDURE — 99214 OFFICE O/P EST MOD 30 MIN: CPT | Performed by: OTHER

## 2021-04-21 PROCEDURE — 3074F SYST BP LT 130 MM HG: CPT | Performed by: OTHER

## 2021-04-21 PROCEDURE — 3078F DIAST BP <80 MM HG: CPT | Performed by: OTHER

## 2021-04-21 RX ORDER — PRAMIPEXOLE DIHYDROCHLORIDE 0.25 MG/1
0.25 TABLET ORAL NIGHTLY
Qty: 90 TABLET | Refills: 1 | Status: SHIPPED | OUTPATIENT
Start: 2021-04-21 | End: 2021-07-21 | Stop reason: ALTCHOICE

## 2021-04-21 RX ORDER — PREGABALIN 75 MG/1
150 CAPSULE ORAL 2 TIMES DAILY
Qty: 120 CAPSULE | Refills: 3 | Status: SHIPPED | OUTPATIENT
Start: 2021-04-21 | End: 2021-07-21 | Stop reason: ALTCHOICE

## 2021-04-21 NOTE — TELEPHONE ENCOUNTER
Per discussion with Dr. Sandra Yeboah, per the CDC guidelines there should be no reason that patient should NOT get the vaccine with the medications she is currently taking. Called patient and informed of above. Denies any further questions.

## 2021-04-21 NOTE — PROGRESS NOTES
Neurology H&P    João Reasons Patient Status:  No patient class for patient encounter    1960 MRN [id number]   Location [address], [address] [place] [address] Drive, [address] [facility] Attending No att. providers found   [facility] Day # 0 PCP MD Nadine Davidson tablet (100 mg total) by mouth 2 (two) times daily. 180 tablet 1   • LEVOTHYROXINE SODIUM 75 MCG Oral Tab TAKE 1 TABLET BY MOUTH IN THE MORNING BEFORE BREAKFAST 90 tablet 0   • Vitamin D3 50 MCG (2000 UT) Oral Cap Take 2,000 Units by mouth daily.      • pre syndrome    • Visual impairment     glasses,contacts       PSHx:  Past Surgical History:   Procedure Laterality Date   •      • COLONOSCOPY N/A 11/10/2017    Procedure: COLONOSCOPY;  Surgeon: Lincoln Guzman DO;  Location: [address] [place] [address] ENDOSCOPY   • COLONOS and fund of knowledge. CRANIAL NERVES II to XII: PERRLA, no ptosis or diplopia, EOM intact, facial sensation intact, strong eye closure, face is symmetric, no dysarthria, tongue midline,  no tongue fasciculations or atrophy, strong shoulder shrug.

## 2021-04-21 NOTE — TELEPHONE ENCOUNTER
Pt is requesting call back to discuss her RLS meds and the COVID vaccine.  She is concerned because she's had med allergies to all but one RLS medication, and she would like to confirm that the doctor feels it's okay for her to move forward with the vaccine

## 2021-04-21 NOTE — PATIENT INSTRUCTIONS
After your visit at the [place] office  today,  please direct any follow up questions or medication needs to the staff in our [place] office so that your concerns may be promptly addressed.   We are available through [employer] or at the numbers below: be picked up in office. • Please allow the office 2-3 business days to fill the prescription. • Patient must present photo ID at time of . PLEASE NOTE: PRESCRIPTIONS MUST BE PICKED UP PRIOR TO 3:00PM MONDAY-FRIDAY    Scheduling Tests:     If your submitting forms to office staff. • Form completion may require an additional fee. • A signed Release of Information (TERRENCE) must be on file before forms may be submitted. When dropping off forms, please ask the  for this paper.    • Failure

## 2021-05-11 RX ORDER — LEVOTHYROXINE SODIUM 0.07 MG/1
TABLET ORAL
Qty: 90 TABLET | Refills: 0 | Status: SHIPPED | OUTPATIENT
Start: 2021-05-11 | End: 2021-07-21

## 2021-05-11 NOTE — TELEPHONE ENCOUNTER
Thyroid Supplements Protocol [id number]/10/2021 06:50 PM   TSH test in past 12 months Protocol Details    TSH value between 0.350 and 5.500 IU/ml     Appointment in past 12 or next 3 months      Last refilled on 01/18/21  for # 90  with 0 rf.   Last TSH; 09/1

## 2021-05-13 ENCOUNTER — PROCEDURE VISIT (OUTPATIENT)
Dept: NEUROLOGY | Facility: CLINIC | Age: 61
End: 2021-05-13
Payer: MEDICARE

## 2021-05-13 DIAGNOSIS — M79.10 MYALGIA: ICD-10-CM

## 2021-05-13 DIAGNOSIS — R20.2 PARESTHESIAS: ICD-10-CM

## 2021-05-13 PROCEDURE — 95911 NRV CNDJ TEST 9-10 STUDIES: CPT | Performed by: OTHER

## 2021-05-13 PROCEDURE — 95886 MUSC TEST DONE W/N TEST COMP: CPT | Performed by: OTHER

## 2021-05-14 NOTE — PROCEDURES
[facility]  [address]  Phone- [phone]  Nerve Conduction & Electromyography Report            Patient: Doris Claude Age: 64 Years 4 Months  Patient ID: [id number] Referring M.D.: Cuca Campbell ?115 100  B. Elbow - Wrist 17 67 ?49      A. Elbow ADM 7.50  9.2  92.6 ?115 100  A. Elbow - B. Elbow 18 68 ?49   L ULNAR - ADM      Wrist ADM 2.50 ?3.60 9.1 ?5.0 100  86.2 ?50.00 Wrist - ADM 7        B. Elbow ADM 4.38  9.2  101 ?115 92.4  B. Elbow - Wrist 15 80

## 2021-06-11 DIAGNOSIS — E66.01 SEVERE OBESITY (HCC): ICD-10-CM

## 2021-06-11 DIAGNOSIS — E66.9 OBESITY (BMI 30-39.9): ICD-10-CM

## 2021-06-11 DIAGNOSIS — Z51.81 ENCOUNTER FOR THERAPEUTIC DRUG MONITORING: ICD-10-CM

## 2021-06-12 ENCOUNTER — TELEPHONE (OUTPATIENT)
Dept: FAMILY MEDICINE CLINIC | Facility: CLINIC | Age: 61
End: 2021-06-12

## 2021-06-12 ENCOUNTER — OFFICE VISIT (OUTPATIENT)
Dept: FAMILY MEDICINE CLINIC | Facility: CLINIC | Age: 61
End: 2021-06-12
Payer: MEDICARE

## 2021-06-12 VITALS
DIASTOLIC BLOOD PRESSURE: 76 MMHG | SYSTOLIC BLOOD PRESSURE: 132 MMHG | HEIGHT: 63 IN | OXYGEN SATURATION: 99 % | WEIGHT: 187 LBS | BODY MASS INDEX: 33.13 KG/M2 | TEMPERATURE: 98 F | RESPIRATION RATE: 20 BRPM | HEART RATE: 68 BPM

## 2021-06-12 DIAGNOSIS — M79.89 LEG SWELLING: Primary | ICD-10-CM

## 2021-06-12 DIAGNOSIS — E03.9 HYPOTHYROIDISM (ACQUIRED): ICD-10-CM

## 2021-06-12 PROCEDURE — 81003 URINALYSIS AUTO W/O SCOPE: CPT | Performed by: FAMILY MEDICINE

## 2021-06-12 PROCEDURE — 84439 ASSAY OF FREE THYROXINE: CPT | Performed by: FAMILY MEDICINE

## 2021-06-12 PROCEDURE — 3078F DIAST BP <80 MM HG: CPT | Performed by: FAMILY MEDICINE

## 2021-06-12 PROCEDURE — 99214 OFFICE O/P EST MOD 30 MIN: CPT | Performed by: FAMILY MEDICINE

## 2021-06-12 PROCEDURE — 85025 COMPLETE CBC W/AUTO DIFF WBC: CPT | Performed by: FAMILY MEDICINE

## 2021-06-12 PROCEDURE — 3075F SYST BP GE 130 - 139MM HG: CPT | Performed by: FAMILY MEDICINE

## 2021-06-12 PROCEDURE — 84443 ASSAY THYROID STIM HORMONE: CPT | Performed by: FAMILY MEDICINE

## 2021-06-12 PROCEDURE — 3008F BODY MASS INDEX DOCD: CPT | Performed by: FAMILY MEDICINE

## 2021-06-12 PROCEDURE — 84481 FREE ASSAY (FT-3): CPT | Performed by: FAMILY MEDICINE

## 2021-06-12 PROCEDURE — 80053 COMPREHEN METABOLIC PANEL: CPT | Performed by: FAMILY MEDICINE

## 2021-06-12 NOTE — PATIENT INSTRUCTIONS
Leg Swelling in Both Legs    Swelling of the feet, ankles, and legs is called edema. It is caused by excess fluid that has collected in the tissues. Extra fluid in the body settles in the lowest part because of gravity.  This is why the legs and feet are about every few hours. Brisk walking is a good exercise. It helps circulate the blood that has collected in your leg. Talk with your provider about using support stockings to stop daytime leg swelling.   · If your provider says that heart disease is causing

## 2021-06-12 NOTE — PROGRESS NOTES
Patient presents with:  Edema: bilateral legs, joint pain       HPI:    Patient ID: Mark Kaur is a 64year old female. HPI   Patient here for leg swelling going on for few months but recently notice it increasing. Left more than right.  It is prese [Escitalopr*    SWELLING    Comment:Legs and ankles  Promethazine            NAUSEA ONLY  Quetiapine Fumarate     HIVES, INSOMNIA, ITCHING  Ropinirole              OTHER (SEE COMMENTS)    Comment:Restless legs  Adhesive Tape           RASH    Comment:Need (hattie) Mother    • Thyroid disease Sister         Hypothyroidism on [place] thyroid   • Cancer Self       Social History: Social History    Tobacco Use      Smoking status: Never Smoker      Smokeless tobacco: Never Used      Tobacco comment: non-smoker

## 2021-06-13 NOTE — TELEPHONE ENCOUNTER
Requesting Metformin  mg  LOV: 2/2/21  RTC: 3 months  Last Relevant Labs: 2/8/21  Filled: 11/18/20 #180 with 1 refills    Future Appointments   Date Time Provider Marie Cerna   6/14/2021 10:20 AM Vic Mckeon MD [facility]   7/21/202

## 2021-06-14 ENCOUNTER — OFFICE VISIT (OUTPATIENT)
Dept: FAMILY MEDICINE CLINIC | Facility: CLINIC | Age: 61
End: 2021-06-14
Payer: MEDICARE

## 2021-06-14 ENCOUNTER — TELEPHONE (OUTPATIENT)
Dept: NEUROLOGY | Facility: CLINIC | Age: 61
End: 2021-06-14

## 2021-06-14 VITALS
RESPIRATION RATE: 16 BRPM | SYSTOLIC BLOOD PRESSURE: 126 MMHG | OXYGEN SATURATION: 98 % | WEIGHT: 185 LBS | HEIGHT: 63 IN | DIASTOLIC BLOOD PRESSURE: 82 MMHG | BODY MASS INDEX: 32.78 KG/M2 | TEMPERATURE: 97 F | HEART RATE: 72 BPM

## 2021-06-14 DIAGNOSIS — M79.89 LEG SWELLING: ICD-10-CM

## 2021-06-14 DIAGNOSIS — E03.9 HYPOTHYROIDISM (ACQUIRED): Primary | ICD-10-CM

## 2021-06-14 PROCEDURE — 3079F DIAST BP 80-89 MM HG: CPT | Performed by: FAMILY MEDICINE

## 2021-06-14 PROCEDURE — 3008F BODY MASS INDEX DOCD: CPT | Performed by: FAMILY MEDICINE

## 2021-06-14 PROCEDURE — 99214 OFFICE O/P EST MOD 30 MIN: CPT | Performed by: FAMILY MEDICINE

## 2021-06-14 PROCEDURE — 3074F SYST BP LT 130 MM HG: CPT | Performed by: FAMILY MEDICINE

## 2021-06-14 RX ORDER — LEVOTHYROXINE SODIUM 88 UG/1
88 TABLET ORAL DAILY
Qty: 90 TABLET | Refills: 0 | Status: SHIPPED | OUTPATIENT
Start: 2021-06-14 | End: 2021-09-13

## 2021-06-14 NOTE — PATIENT INSTRUCTIONS
Please call office in 15 days to get update on leg swelling. Leg Swelling in Both Legs    Swelling of the feet, ankles, and legs is called edema. It is caused by excess fluid that has collected in the tissues.  Extra fluid in the body settles in the lowes in one place for long periods of time. Take breaks and walk about every few hours. Brisk walking is a good exercise. It helps circulate the blood that has collected in your leg.  Talk with your provider about using support stockings to stop daytime leg swel

## 2021-06-14 NOTE — TELEPHONE ENCOUNTER
Pt said he went to Dr. Luis Mirza, Saturday she had extreme leg swelling which came on suddenly, could only wear flip flops and had trouble walking. One of her medications can cause that. (Hopefully its not the Lyrica!)  Pt would like a call asap.

## 2021-06-14 NOTE — TELEPHONE ENCOUNTER
I called Ms. Holguin. She states that she has been getting worsening LE edema gradually. She states that her LE and feet are very swollen. She is having joint pains. Lyrica can cause LE edema. She should go down to 75mg PO BID for 3-4 then 75mg Qday for 3-4 days then stop. If needed for RLS we can increase Mirapex to 0.25mg nightly. She states that the combination of Dana and Mirapex is working well to control her RLS symptoms.  She had a similar swelling gabapentin

## 2021-06-14 NOTE — PROGRESS NOTES
Patient presents with: Follow - Up: on leg sweelling       HPI:    Patient ID: Avery Connelly is a 64year old female. HPI   Patient here to follow up on leg swelling. She was seen last wk and recommend to stop lyrica and call neurology office.  She h Comment:swelling  Lexapro [Escitalopr*    SWELLING    Comment:Legs and ankles  Promethazine            NAUSEA ONLY  Quetiapine Fumarate     HIVES, INSOMNIA, ITCHING  Ropinirole              OTHER (SEE COMMENTS)    Comment:Restless legs  Adhesive Tape Father         lung   • Other (anuyrsem) Mother    • Thyroid disease Sister         Hypothyroidism on [place] thyroid   • Cancer Self       Social History: Social History    Tobacco Use      Smoking status: Never Smoker      Smokeless tobacco: Never Used

## 2021-06-15 ENCOUNTER — PATIENT MESSAGE (OUTPATIENT)
Dept: FAMILY MEDICINE CLINIC | Facility: CLINIC | Age: 61
End: 2021-06-15

## 2021-06-15 NOTE — TELEPHONE ENCOUNTER
From: Candie Escalera  To: Gina Leija MD  Sent: 6/15/2021 2:54 PM CDT  Subject: Visit Follow-up Question    Is there a after visit summery?  I see one on the website but there is nothing there and I would like to know  what we talked about and how I ne

## 2021-06-16 RX ORDER — METFORMIN HYDROCHLORIDE 750 MG/1
TABLET, EXTENDED RELEASE ORAL
Qty: 180 TABLET | Refills: 0 | Status: SHIPPED | OUTPATIENT
Start: 2021-06-16 | End: 2022-01-19 | Stop reason: ALTCHOICE

## 2021-06-16 NOTE — TELEPHONE ENCOUNTER
Talked with patient. Can continue thyroid high dose for now for atleast 2 wk and if swelling not improving then consider tapering lyrca.

## 2021-06-16 NOTE — TELEPHONE ENCOUNTER
Future Appointments   Date Time Provider Marie Cerna   7/21/2021  9:40 AM DO NABEEL Cifuentes     Patient did read the note to schedule and still has not.

## 2021-07-20 ENCOUNTER — TELEPHONE (OUTPATIENT)
Dept: FAMILY MEDICINE CLINIC | Facility: CLINIC | Age: 61
End: 2021-07-20

## 2021-07-20 DIAGNOSIS — G25.81 RESTLESS LEG SYNDROME: Primary | ICD-10-CM

## 2021-07-21 ENCOUNTER — OFFICE VISIT (OUTPATIENT)
Dept: NEUROLOGY | Facility: CLINIC | Age: 61
End: 2021-07-21
Payer: MEDICARE

## 2021-07-21 VITALS
BODY MASS INDEX: 34 KG/M2 | DIASTOLIC BLOOD PRESSURE: 66 MMHG | RESPIRATION RATE: 16 BRPM | SYSTOLIC BLOOD PRESSURE: 110 MMHG | HEART RATE: 68 BPM | WEIGHT: 190 LBS

## 2021-07-21 DIAGNOSIS — G25.81 RESTLESS LEG SYNDROME: Primary | ICD-10-CM

## 2021-07-21 PROCEDURE — 99213 OFFICE O/P EST LOW 20 MIN: CPT | Performed by: OTHER

## 2021-07-21 PROCEDURE — 3074F SYST BP LT 130 MM HG: CPT | Performed by: OTHER

## 2021-07-21 PROCEDURE — 3078F DIAST BP <80 MM HG: CPT | Performed by: OTHER

## 2021-07-21 RX ORDER — CLONAZEPAM 0.5 MG/1
0.5 TABLET ORAL NIGHTLY PRN
Qty: 30 TABLET | Refills: 1 | Status: SHIPPED | OUTPATIENT
Start: 2021-07-21 | End: 2021-09-28

## 2021-07-21 NOTE — PATIENT INSTRUCTIONS
After your visit at the [place] office  today,  please direct any follow up questions or medication needs to the staff in our [place] office so that your concerns may be promptly addressed.   We are available through [employer] or at the numbers below: be picked up in office. • Please allow the office 2-3 business days to fill the prescription. • Patient must present photo ID at time of . PLEASE NOTE: PRESCRIPTIONS MUST BE PICKED UP PRIOR TO 3:00PM MONDAY-FRIDAY    Scheduling Tests:     If your submitting forms to office staff. • Form completion may require an additional fee. • A signed Release of Information (TERRENCE) must be on file before forms may be submitted. When dropping off forms, please ask the  for this paper.    • Failure

## 2021-07-21 NOTE — PROGRESS NOTES
Patient feels she has been having swelling from some of her medications. RLS has been worse due to the swelling.

## 2021-07-21 NOTE — PROGRESS NOTES
Neurology H&P    Maricruz Manjarrez Patient Status:  No patient class for patient encounter    1960 MRN [id number]   Location [address] [place] [address], [address] [place] [address] Drive, [address] [place] [address] Attending No att. providers found   [facility] Day # 0 PCP MD Claudia Gavin Mirapex She feels that the Mirapex is causing the swelling. She has had side effects from Sinemet, Mirapex, lyrica, gabapentin and ropinirole at this point.  Flles that one of her medications is also causing her to feel off balance sometime    Current Medic upper back pain from radiation   • Bipolar disorder ([facility] [address]    • Blood disorder     anemia resolved   • Depression    • Exposure to radiation     last dose February 2014   • Fibromyalgia    • History of blood transfusion 2013   • Hypothyroidism (acquired) negative, except for pertinent positive and negatives stated in subjective. Objective/Physical Exam:    Vital Signs:  not currently breastfeeding.     Gen: Awake and in no apparent distress  HEENT: moist mucus membranes  Neck: Supple  Cardiovascular: Reg lyrica and I can try a low dose of clonazepam 0.5mg PO at bedtime. If this fails she may need to see a specialist at a tertiary center to see if there are any other medications that can assist her. Plan:  1.  RLS  - Stop Mirapex  - Reports allergies to g

## 2021-08-13 ENCOUNTER — PATIENT OUTREACH (OUTPATIENT)
Dept: FAMILY MEDICINE CLINIC | Facility: CLINIC | Age: 61
End: 2021-08-13

## 2021-08-13 NOTE — PROGRESS NOTES
Pt refuses to schedule Medicare Supervisit, pt is due for Thyroid f/u, encouraged to schedule Wellness visit and could combine appts,  Pt refused states she doesn't like answering the questions.

## 2021-08-20 ENCOUNTER — OFFICE VISIT (OUTPATIENT)
Dept: FAMILY MEDICINE CLINIC | Facility: CLINIC | Age: 61
End: 2021-08-20
Payer: MEDICARE

## 2021-08-20 VITALS
BODY MASS INDEX: 32.96 KG/M2 | DIASTOLIC BLOOD PRESSURE: 68 MMHG | TEMPERATURE: 98 F | WEIGHT: 186 LBS | RESPIRATION RATE: 16 BRPM | HEIGHT: 63 IN | SYSTOLIC BLOOD PRESSURE: 104 MMHG | OXYGEN SATURATION: 99 % | HEART RATE: 65 BPM

## 2021-08-20 DIAGNOSIS — M79.89 LEG SWELLING: ICD-10-CM

## 2021-08-20 DIAGNOSIS — R63.5 WEIGHT GAIN: ICD-10-CM

## 2021-08-20 DIAGNOSIS — E03.9 HYPOTHYROIDISM (ACQUIRED): Primary | ICD-10-CM

## 2021-08-20 LAB
T3FREE SERPL-MCNC: 2.74 PG/ML (ref 2.4–4.2)
T4 FREE SERPL-MCNC: 0.9 NG/DL (ref 0.8–1.7)
TSI SER-ACNC: 0.29 MIU/ML (ref 0.36–3.74)

## 2021-08-20 PROCEDURE — 84443 ASSAY THYROID STIM HORMONE: CPT | Performed by: FAMILY MEDICINE

## 2021-08-20 PROCEDURE — 84439 ASSAY OF FREE THYROXINE: CPT | Performed by: FAMILY MEDICINE

## 2021-08-20 PROCEDURE — 3074F SYST BP LT 130 MM HG: CPT | Performed by: FAMILY MEDICINE

## 2021-08-20 PROCEDURE — 3008F BODY MASS INDEX DOCD: CPT | Performed by: FAMILY MEDICINE

## 2021-08-20 PROCEDURE — 3078F DIAST BP <80 MM HG: CPT | Performed by: FAMILY MEDICINE

## 2021-08-20 PROCEDURE — 99214 OFFICE O/P EST MOD 30 MIN: CPT | Performed by: FAMILY MEDICINE

## 2021-08-20 PROCEDURE — 84481 FREE ASSAY (FT-3): CPT | Performed by: FAMILY MEDICINE

## 2021-08-20 NOTE — PROGRESS NOTES
Patient presents with: Follow - Up: Thyroid       HPI:    Patient ID: Mike Patel is a 64year old female. HPI   Patient here for follow up on thyroid. She is feeling much better with her symptoms. No joint pain. More energy. Less fatigue.  Leg swel back pain from radiation   • Bipolar disorder [facility])    • Blood disorder     anemia resolved   • Depression    • Exposure to radiation     last dose February 2014   • Fibromyalgia    • History of blood transfusion 2013   • Hypothyroidism (acquired)     Dx i Position: Sitting, Cuff Size: adult)   Pulse 65   Temp 97.7 °F (36.5 °C) (Temporal)   Resp 16   Ht 5' 3\" (1.6 m)   Wt 186 lb (84.4 kg)   SpO2 99%   Breastfeeding No   BMI 32.95 kg/m²    Physical Exam  Cardiovascular:      Rate and Rhythm: Normal rate and

## 2021-08-26 ENCOUNTER — TELEPHONE (OUTPATIENT)
Dept: HEMATOLOGY/ONCOLOGY | Age: 61
End: 2021-08-26

## 2021-08-26 NOTE — TELEPHONE ENCOUNTER
Please put labs in chart for this patient when you have a chance. . Her appointment is September 30, 2021 at 10:30 and she will be getting the labs prior to her appointment. Thank you. Killian Mosqueda

## 2021-09-29 RX ORDER — CLONAZEPAM 0.5 MG/1
0.5 TABLET ORAL NIGHTLY PRN
Qty: 30 TABLET | Refills: 1 | Status: SHIPPED | OUTPATIENT
Start: 2021-09-29 | End: 2021-10-20

## 2021-09-29 NOTE — TELEPHONE ENCOUNTER
Medication: clonazePAM 0.5 MG Oral Tab       Date of last refill: 07/21/2021 (#30/1)  [place] 94/15/4432    Last office visit: 07/21/2021  Due back to clinic per last office note:  3 months  Date next office visit schedu

## 2021-09-30 ENCOUNTER — OFFICE VISIT (OUTPATIENT)
Dept: HEMATOLOGY/ONCOLOGY | Age: 61
End: 2021-09-30
Attending: INTERNAL MEDICINE
Payer: MEDICARE

## 2021-09-30 VITALS
SYSTOLIC BLOOD PRESSURE: 144 MMHG | DIASTOLIC BLOOD PRESSURE: 83 MMHG | RESPIRATION RATE: 18 BRPM | HEART RATE: 82 BPM | OXYGEN SATURATION: 98 % | WEIGHT: 184.88 LBS | BODY MASS INDEX: 33 KG/M2 | TEMPERATURE: 97 F

## 2021-09-30 DIAGNOSIS — G25.81 RESTLESS LEG SYNDROME: ICD-10-CM

## 2021-09-30 DIAGNOSIS — C21.0 ANAL CARCINOMA (HCC): Primary | ICD-10-CM

## 2021-09-30 LAB
ALBUMIN SERPL-MCNC: 3.8 G/DL (ref 3.4–5)
ALBUMIN/GLOB SERPL: 1.2 {RATIO} (ref 1–2)
ALP LIVER SERPL-CCNC: 71 U/L
ALT SERPL-CCNC: 25 U/L
ANION GAP SERPL CALC-SCNC: 4 MMOL/L (ref 0–18)
AST SERPL-CCNC: 12 U/L (ref 15–37)
BASOPHILS # BLD AUTO: 0.03 X10(3) UL (ref 0–0.2)
BASOPHILS NFR BLD AUTO: 0.8 %
BILIRUB SERPL-MCNC: 0.3 MG/DL (ref 0.1–2)
BUN BLD-MCNC: 16 MG/DL (ref 7–18)
CALCIUM BLD-MCNC: 10.3 MG/DL (ref 8.5–10.1)
CHLORIDE SERPL-SCNC: 114 MMOL/L (ref 98–112)
CO2 SERPL-SCNC: 25 MMOL/L (ref 21–32)
CREAT BLD-MCNC: 0.93 MG/DL
DEPRECATED HBV CORE AB SER IA-ACNC: 140.4 NG/ML
EOSINOPHIL # BLD AUTO: 0.11 X10(3) UL (ref 0–0.7)
EOSINOPHIL NFR BLD AUTO: 3 %
ERYTHROCYTE [DISTWIDTH] IN BLOOD BY AUTOMATED COUNT: 13.1 %
GLOBULIN PLAS-MCNC: 3.1 G/DL (ref 2.8–4.4)
GLUCOSE BLD-MCNC: 101 MG/DL (ref 70–99)
HCT VFR BLD AUTO: 39.6 %
HGB BLD-MCNC: 13 G/DL
IMM GRANULOCYTES # BLD AUTO: 0.01 X10(3) UL (ref 0–1)
IMM GRANULOCYTES NFR BLD: 0.3 %
IRON SATURATION: 20 %
IRON SERPL-MCNC: 71 UG/DL
LYMPHOCYTES # BLD AUTO: 1.02 X10(3) UL (ref 1–4)
LYMPHOCYTES NFR BLD AUTO: 28.1 %
MCH RBC QN AUTO: 29.6 PG (ref 26–34)
MCHC RBC AUTO-ENTMCNC: 32.8 G/DL (ref 31–37)
MCV RBC AUTO: 90.2 FL
MONOCYTES # BLD AUTO: 0.27 X10(3) UL (ref 0.1–1)
MONOCYTES NFR BLD AUTO: 7.4 %
NEUTROPHILS # BLD AUTO: 2.19 X10 (3) UL (ref 1.5–7.7)
NEUTROPHILS # BLD AUTO: 2.19 X10(3) UL (ref 1.5–7.7)
NEUTROPHILS NFR BLD AUTO: 60.4 %
OSMOLALITY SERPL CALC.SUM OF ELEC: 297 MOSM/KG (ref 275–295)
PATIENT FASTING Y/N/NP: NO
PLATELET # BLD AUTO: 222 10(3)UL (ref 150–450)
POTASSIUM SERPL-SCNC: 4 MMOL/L (ref 3.5–5.1)
PROT SERPL-MCNC: 6.9 G/DL (ref 6.4–8.2)
RBC # BLD AUTO: 4.39 X10(6)UL
SODIUM SERPL-SCNC: 143 MMOL/L (ref 136–145)
TOTAL IRON BINDING CAPACITY: 364 UG/DL (ref 240–450)
TRANSFERRIN SERPL-MCNC: 244 MG/DL (ref 200–360)
WBC # BLD AUTO: 3.6 X10(3) UL (ref 4–11)

## 2021-09-30 PROCEDURE — 99213 OFFICE O/P EST LOW 20 MIN: CPT | Performed by: INTERNAL MEDICINE

## 2021-09-30 NOTE — PROGRESS NOTES
Cancer Center Progress Note  Patient Name: Mark Kaur   YOB: 1960   Medical Record Number: [id number]   CSN: [id number]   Attending Physician: Selvin Barnes M.D.        Date of Visit: 9/30/2021      Chief Complaint:  Patient present is unchanged. She reports that she has had bad reactions to every medication she has tried for restless leg syndrome.      Performance Status:  ECOG 1    Past Medical History:  Past Medical History:   Diagnosis Date   • Anal cancer ([facility] [address] 2013   • Anal cance on [place] thyroid   • Cancer Self        Social History:  Social History    Socioeconomic History      Marital status:       Spouse name: Not on file      Number of children: Not on file      Years of education: Not on file      Highest education le file      Attends Yazidism Services: Not on file      Active Member of Clubs or Organizations: Not on file      Attends Club or Organization Meetings: Not on file      Marital Status: Not on file  Intimate Partner Violence:       Fear of Current or Ex-Par Tape           RASH    Comment:Need to use surgery glue     Review of Systems:    Constitutional No fevers, chills, night sweats, excessive fatigue or weight loss. Eyes No significant visual difficulties. No diplopia. No yellowing of the eyes.    Hematolo normal gait, cranial nerves intact. Psychiatric Normal - Alert and oriented times three. Coherent speech. Verbalizes understanding of our discussions today.          Laboratory:  Recent Labs     09/30/21  1042   RBC 4.39   HGB 13.0   HCT 39.6   MCV 90.2

## 2021-10-20 ENCOUNTER — OFFICE VISIT (OUTPATIENT)
Dept: NEUROLOGY | Facility: CLINIC | Age: 61
End: 2021-10-20
Payer: MEDICARE

## 2021-10-20 VITALS
RESPIRATION RATE: 16 BRPM | HEART RATE: 76 BPM | SYSTOLIC BLOOD PRESSURE: 118 MMHG | DIASTOLIC BLOOD PRESSURE: 72 MMHG | BODY MASS INDEX: 33 KG/M2 | WEIGHT: 186 LBS

## 2021-10-20 DIAGNOSIS — G25.81 RESTLESS LEG SYNDROME: Primary | ICD-10-CM

## 2021-10-20 PROCEDURE — 3074F SYST BP LT 130 MM HG: CPT | Performed by: OTHER

## 2021-10-20 PROCEDURE — 99213 OFFICE O/P EST LOW 20 MIN: CPT | Performed by: OTHER

## 2021-10-20 PROCEDURE — 3078F DIAST BP <80 MM HG: CPT | Performed by: OTHER

## 2021-10-20 RX ORDER — CLONAZEPAM 0.5 MG/1
0.5 TABLET ORAL NIGHTLY PRN
Qty: 30 TABLET | Refills: 2 | Status: SHIPPED | OUTPATIENT
Start: 2021-10-20

## 2021-10-20 RX ORDER — PREGABALIN 75 MG/1
150 CAPSULE ORAL 2 TIMES DAILY
COMMUNITY
Start: 2021-08-25 | End: 2021-10-20 | Stop reason: ALTCHOICE

## 2021-10-20 NOTE — PROGRESS NOTES
Neurology H&P    Sulmaelian Cuevas Patient Status:  No patient class for patient encounter    1960 MRN [id number]   Location [facility], [address] [place] [address] Drive, [address] [facility] Attending No att. providers found   [facility] Day # 0 PCP MD Mariola Kelley (0.5 mg total) by mouth nightly as needed for Anxiety. 30 tablet 1   • levothyroxine 88 MCG Oral Tab Take 1 tablet (88 mcg total) by mouth daily. 90 tablet 0   • Estradiol (ESTRACE) 0.1 MG/GM Vaginal Cream Place 1 g vaginally twice a week.  1 Tube 3   • Vit Restless leg syndrome    • Restless leg syndrome    • Visual impairment     glasses,contacts       PSHx:  Past Surgical History:   Procedure Laterality Date   •      • COLONOSCOPY N/A 11/10/2017    Procedure: COLONOSCOPY;  Surgeon: Sonu Doe, of knowledge. CRANIAL NERVES II to XII: PERRLA, no ptosis or diplopia, EOM intact, facial sensation intact, strong eye closure, face is symmetric, no dysarthria, tongue midline,  no tongue fasciculations or atrophy, strong shoulder shrug.     MOTOR EXA

## 2021-10-20 NOTE — PATIENT INSTRUCTIONS
After your visit at the [place] office  today,  please direct any follow up questions or medication needs to the staff in our [place] office so that your concerns may be promptly addressed.   We are available through [employer] or at the numbers below: be picked up in office. • Please allow the office 2-3 business days to fill the prescription. • Patient must present photo ID at time of . PLEASE NOTE: PRESCRIPTIONS MUST BE PICKED UP PRIOR TO 3:00PM MONDAY-FRIDAY    Scheduling Tests:     If your submitting forms to office staff. • Form completion may require an additional fee. • A signed Release of Information (TERRENCE) must be on file before forms may be submitted. When dropping off forms, please ask the  for this paper.    • Failure

## 2021-10-28 ENCOUNTER — TELEPHONE (OUTPATIENT)
Dept: FAMILY MEDICINE CLINIC | Facility: CLINIC | Age: 61
End: 2021-10-28

## 2021-10-28 NOTE — TELEPHONE ENCOUNTER
Called to schedule MAWV for the year for her and her spouse. Pt states that it is not a good time to schedule visit due to her taking care of her Mother in [place] and will call back at a better time.

## 2021-12-02 ENCOUNTER — PATIENT OUTREACH (OUTPATIENT)
Dept: FAMILY MEDICINE CLINIC | Facility: CLINIC | Age: 61
End: 2021-12-02

## 2021-12-10 DIAGNOSIS — E03.9 HYPOTHYROIDISM (ACQUIRED): ICD-10-CM

## 2021-12-11 RX ORDER — LEVOTHYROXINE SODIUM 88 UG/1
TABLET ORAL
Qty: 90 TABLET | Refills: 0 | Status: SHIPPED | OUTPATIENT
Start: 2021-12-11 | End: 2022-06-07

## 2022-01-19 ENCOUNTER — OFFICE VISIT (OUTPATIENT)
Dept: NEUROLOGY | Facility: CLINIC | Age: 62
End: 2022-01-19
Payer: MEDICARE

## 2022-01-19 VITALS — SYSTOLIC BLOOD PRESSURE: 134 MMHG | HEART RATE: 83 BPM | DIASTOLIC BLOOD PRESSURE: 80 MMHG

## 2022-01-19 DIAGNOSIS — G25.81 RESTLESS LEG SYNDROME: Primary | ICD-10-CM

## 2022-01-19 DIAGNOSIS — M54.81 OCCIPITAL NEURALGIA OF RIGHT SIDE: ICD-10-CM

## 2022-01-19 DIAGNOSIS — M54.12 CERVICAL RADICULOPATHY: ICD-10-CM

## 2022-01-19 PROCEDURE — 3075F SYST BP GE 130 - 139MM HG: CPT | Performed by: OTHER

## 2022-01-19 PROCEDURE — 99214 OFFICE O/P EST MOD 30 MIN: CPT | Performed by: OTHER

## 2022-01-19 PROCEDURE — 3079F DIAST BP 80-89 MM HG: CPT | Performed by: OTHER

## 2022-01-19 RX ORDER — TOPIRAMATE 25 MG/1
25 TABLET ORAL 2 TIMES DAILY
Qty: 30 TABLET | Refills: 1 | Status: SHIPPED | OUTPATIENT
Start: 2022-01-19

## 2022-01-19 NOTE — PROGRESS NOTES
Patient reports that on 1/17/2022 and she blew her nose and she had a very sharp headache. Patient went to ER for migraine cocktail. She reports she still has a dull headache. Patient reports the RLS remains the same.

## 2022-01-19 NOTE — PROGRESS NOTES
Neurology H&P    Rice Means Patient Status:  No patient class for patient encounter    1960 MRN [id number]   Location [address], [address] [place] [address] Drive, [address] [facility] Attending No att. providers found   [facility] # 0 [facility] is not working as much any longer either. She has already seen several specialists for this. She also tells me that she has been having migraines, She has a h/o migraines. She had a recent migraine that sent her to the ED.  She states that she st • Anal cancer [facility]) 2013   • Anal cancer ([facility] 2013   • Anesthesia complication     slow to arouse   • Anxiety state    • Asthma     excerise induced   • Back problem     upper back pain from radiation   • Bipolar disorder ([facility]    • Blood disorder     a lung   • Other (anuyrsem) Mother    • Thyroid disease Sister         Hypothyroidism on Moore thyroid   • Cancer Self        ROS:  10 point ROS completed and was negative, except for pertinent positive and negatives stated in subjective.     Objective/Ph reactions. Lyrica also caused LE edema. She tells me that she feels that the Miripex is also causing her LE to swell. MRI/CTH and MRA form a recent ED visit were reviewed tosday. I did not see any acute lesions or focal vascular stenosis. Plan:  1.  RL

## 2022-01-19 NOTE — PATIENT INSTRUCTIONS
After your visit at the [place] office today,  please direct any follow up questions or medication needs to the staff in our [place] office so that your concerns may be promptly addressed.   We are available through [email or web address] or at the numbers below: be picked up in office. • Please allow the office 2-3 business days to fill the prescription. • Patient must present photo ID at time of . PLEASE NOTE: PRESCRIPTIONS MUST BE PICKED UP PRIOR TO 3:00PM MONDAY-FRIDAY    Scheduling Tests:     If your submitting forms to office staff. • Form completion may require an additional fee. • A signed Release of Information (TERRENCE) must be on file before forms may be submitted. When dropping off forms, please ask the  for this paper.    • Failure

## 2022-01-20 ENCOUNTER — TELEPHONE (OUTPATIENT)
Dept: NEUROLOGY | Facility: CLINIC | Age: 62
End: 2022-01-20

## 2022-01-20 ENCOUNTER — TELEPHONE (OUTPATIENT)
Dept: SURGERY | Facility: CLINIC | Age: 62
End: 2022-01-20

## 2022-01-20 RX ORDER — METHYLPREDNISOLONE 4 MG/1
TABLET ORAL
Qty: 1 EACH | Refills: 0 | Status: SHIPPED | OUTPATIENT
Start: 2022-01-20 | End: 2022-04-21

## 2022-01-20 NOTE — TELEPHONE ENCOUNTER
Pt would like to know if can take naproxin,tylenol in addition to Topamax? Ok to leave voice mail or [facility] [employer] [facility] [employer] message per pt.

## 2022-01-20 NOTE — TELEPHONE ENCOUNTER
Patient notified she can take pain medication while taking Topamax. Discussed overuse and rebound headaches.

## 2022-01-21 NOTE — TELEPHONE ENCOUNTER
Continue to have headache/right occipital area radiating towards right ear  Took Naproxen with mild relief.   Advised to ice pack and start Medrol dose pack- [facility]

## 2022-01-21 NOTE — TELEPHONE ENCOUNTER
Pt called and stated she is still in pain  And doesm't know what she should do, pt is stating she is currently on the Topamax but it's not helping, she would like to know if  Could possibly prescribe something else; pt also states that her second issue

## 2022-01-24 NOTE — TELEPHONE ENCOUNTER
I called. Monica back regarding her headache. We just started Topamax last week for her migraines. She states that the back of her head still hurts. This is more often when bending over. She has already been prescribed a steroid pack.  She thinks that this h

## 2022-01-25 ENCOUNTER — TELEPHONE (OUTPATIENT)
Dept: NEUROLOGY | Facility: CLINIC | Age: 62
End: 2022-01-25

## 2022-01-25 ENCOUNTER — HOSPITAL ENCOUNTER (OUTPATIENT)
Dept: MRI IMAGING | Age: 62
Discharge: HOME | End: 2022-01-25
Attending: Other
Payer: MEDICARE

## 2022-01-25 DIAGNOSIS — M54.81 OCCIPITAL NEURALGIA OF RIGHT SIDE: ICD-10-CM

## 2022-01-25 DIAGNOSIS — M54.12 CERVICAL RADICULOPATHY: ICD-10-CM

## 2022-01-25 PROCEDURE — 72141 MRI NECK SPINE W/O DYE: CPT | Performed by: OTHER

## 2022-01-25 NOTE — TELEPHONE ENCOUNTER
I called Monica regarding her MRI C spine. She has foraminal stenosis on the R at C4-C5 and C5-C6. This is not likely causing her R occpital area pains though. She could still have a R occipital neuralgia. She did not answer and I left a VM for her.  We can

## 2022-01-26 ENCOUNTER — PATIENT OUTREACH (OUTPATIENT)
Dept: FAMILY MEDICINE CLINIC | Facility: CLINIC | Age: 62
End: 2022-01-26

## 2022-01-26 ENCOUNTER — TELEPHONE (OUTPATIENT)
Dept: NEUROLOGY | Facility: CLINIC | Age: 62
End: 2022-01-26

## 2022-01-26 DIAGNOSIS — M54.12 CERVICAL RADICULOPATHY: Primary | ICD-10-CM

## 2022-01-26 NOTE — TELEPHONE ENCOUNTER
Pt said Dr. Dayana Malave was to put in an order for her to go to PT for her neck. Please place order and call pt to let her know when it is ready.

## 2022-01-26 NOTE — TELEPHONE ENCOUNTER
Per LONNIE yesterday:    Celia Close, DO         1/25/22 12:43 PM  Note  I called Monica regarding her MRI C spine. She has foraminal stenosis on the R at C4-C5 and C5-C6. This is not likely causing her R occpital area pains though.  She could still have

## 2022-02-16 ENCOUNTER — PATIENT OUTREACH (OUTPATIENT)
Dept: FAMILY MEDICINE CLINIC | Facility: CLINIC | Age: 62
End: 2022-02-16

## 2022-04-21 ENCOUNTER — TELEPHONE (OUTPATIENT)
Dept: FAMILY MEDICINE CLINIC | Facility: CLINIC | Age: 62
End: 2022-04-21

## 2022-04-21 ENCOUNTER — OFFICE VISIT (OUTPATIENT)
Dept: FAMILY MEDICINE CLINIC | Facility: CLINIC | Age: 62
End: 2022-04-21
Payer: MEDICARE

## 2022-04-21 VITALS
HEART RATE: 72 BPM | OXYGEN SATURATION: 99 % | WEIGHT: 182 LBS | RESPIRATION RATE: 18 BRPM | SYSTOLIC BLOOD PRESSURE: 112 MMHG | BODY MASS INDEX: 32 KG/M2 | DIASTOLIC BLOOD PRESSURE: 76 MMHG | TEMPERATURE: 97 F

## 2022-04-21 DIAGNOSIS — Z12.31 BREAST CANCER SCREENING BY MAMMOGRAM: ICD-10-CM

## 2022-04-21 DIAGNOSIS — E53.8 LOW VITAMIN B12 LEVEL: ICD-10-CM

## 2022-04-21 DIAGNOSIS — M79.10 MYALGIA: ICD-10-CM

## 2022-04-21 DIAGNOSIS — M79.7 FIBROMYALGIA: ICD-10-CM

## 2022-04-21 DIAGNOSIS — Z00.00 ANNUAL PHYSICAL EXAM: Primary | ICD-10-CM

## 2022-04-21 DIAGNOSIS — M54.81 OCCIPITAL NEURALGIA OF RIGHT SIDE: ICD-10-CM

## 2022-04-21 LAB
ALBUMIN SERPL-MCNC: 4.3 G/DL (ref 3.4–5)
ALBUMIN/GLOB SERPL: 1.4 {RATIO} (ref 1–2)
ALP LIVER SERPL-CCNC: 69 U/L
ALT SERPL-CCNC: 33 U/L
ANION GAP SERPL CALC-SCNC: 4 MMOL/L (ref 0–18)
AST SERPL-CCNC: 23 U/L (ref 15–37)
BASOPHILS # BLD AUTO: 0.02 X10(3) UL (ref 0–0.2)
BASOPHILS NFR BLD AUTO: 0.5 %
BILIRUB SERPL-MCNC: 0.5 MG/DL (ref 0.1–2)
BUN BLD-MCNC: 18 MG/DL (ref 7–18)
CALCIUM BLD-MCNC: 10.8 MG/DL (ref 8.5–10.1)
CHLORIDE SERPL-SCNC: 106 MMOL/L (ref 98–112)
CHOLEST SERPL-MCNC: 336 MG/DL (ref ?–200)
CO2 SERPL-SCNC: 28 MMOL/L (ref 21–32)
CREAT BLD-MCNC: 0.78 MG/DL
EOSINOPHIL # BLD AUTO: 0.15 X10(3) UL (ref 0–0.7)
EOSINOPHIL NFR BLD AUTO: 3.6 %
ERYTHROCYTE [DISTWIDTH] IN BLOOD BY AUTOMATED COUNT: 13.2 %
FASTING PATIENT LIPID ANSWER: YES
FASTING STATUS PATIENT QL REPORTED: YES
GLOBULIN PLAS-MCNC: 3 G/DL (ref 2.8–4.4)
GLUCOSE BLD-MCNC: 94 MG/DL (ref 70–99)
HCT VFR BLD AUTO: 41.3 %
HDLC SERPL-MCNC: 43 MG/DL (ref 40–59)
HGB BLD-MCNC: 13.5 G/DL
IMM GRANULOCYTES # BLD AUTO: 0.03 X10(3) UL (ref 0–1)
IMM GRANULOCYTES NFR BLD: 0.7 %
LDLC SERPL CALC-MCNC: 233 MG/DL (ref ?–100)
LYMPHOCYTES # BLD AUTO: 1.27 X10(3) UL (ref 1–4)
LYMPHOCYTES NFR BLD AUTO: 30.9 %
MCH RBC QN AUTO: 29.9 PG (ref 26–34)
MCHC RBC AUTO-ENTMCNC: 32.7 G/DL (ref 31–37)
MCV RBC AUTO: 91.6 FL
MONOCYTES # BLD AUTO: 0.3 X10(3) UL (ref 0.1–1)
MONOCYTES NFR BLD AUTO: 7.3 %
NEUTROPHILS # BLD AUTO: 2.34 X10 (3) UL (ref 1.5–7.7)
NEUTROPHILS # BLD AUTO: 2.34 X10(3) UL (ref 1.5–7.7)
NEUTROPHILS NFR BLD AUTO: 57 %
NONHDLC SERPL-MCNC: 293 MG/DL (ref ?–130)
OSMOLALITY SERPL CALC.SUM OF ELEC: 288 MOSM/KG (ref 275–295)
PLATELET # BLD AUTO: 245 10(3)UL (ref 150–450)
POTASSIUM SERPL-SCNC: 4.5 MMOL/L (ref 3.5–5.1)
PROT SERPL-MCNC: 7.3 G/DL (ref 6.4–8.2)
RBC # BLD AUTO: 4.51 X10(6)UL
RHEUMATOID FACT SERPL-ACNC: <10 IU/ML (ref ?–15)
SODIUM SERPL-SCNC: 138 MMOL/L (ref 136–145)
TRIGL SERPL-MCNC: 289 MG/DL (ref 30–149)
TSI SER-ACNC: 0.73 MIU/ML (ref 0.36–3.74)
VIT B12 SERPL-MCNC: 346 PG/ML (ref 193–986)
VIT D+METAB SERPL-MCNC: 74.6 NG/ML (ref 30–100)
VLDLC SERPL CALC-MCNC: 68 MG/DL (ref 0–30)
WBC # BLD AUTO: 4.1 X10(3) UL (ref 4–11)

## 2022-04-21 PROCEDURE — 80061 LIPID PANEL: CPT | Performed by: FAMILY MEDICINE

## 2022-04-21 PROCEDURE — 85025 COMPLETE CBC W/AUTO DIFF WBC: CPT | Performed by: FAMILY MEDICINE

## 2022-04-21 PROCEDURE — 82306 VITAMIN D 25 HYDROXY: CPT | Performed by: FAMILY MEDICINE

## 2022-04-21 PROCEDURE — 82607 VITAMIN B-12: CPT | Performed by: FAMILY MEDICINE

## 2022-04-21 PROCEDURE — 86038 ANTINUCLEAR ANTIBODIES: CPT | Performed by: FAMILY MEDICINE

## 2022-04-21 PROCEDURE — 86431 RHEUMATOID FACTOR QUANT: CPT | Performed by: FAMILY MEDICINE

## 2022-04-21 PROCEDURE — 84443 ASSAY THYROID STIM HORMONE: CPT | Performed by: FAMILY MEDICINE

## 2022-04-21 PROCEDURE — 80053 COMPREHEN METABOLIC PANEL: CPT | Performed by: FAMILY MEDICINE

## 2022-04-21 RX ORDER — TOPIRAMATE 25 MG/1
25 TABLET ORAL 2 TIMES DAILY
Qty: 60 TABLET | Refills: 0 | Status: SHIPPED | OUTPATIENT
Start: 2022-04-21

## 2022-04-21 NOTE — TELEPHONE ENCOUNTER
Apt scheduled.   Future Appointments   Date Time Provider Marie Eryn   4/26/2022  3:20 PM Coleen Birmingham MD [facility] [place]   5/18/2022  2:40 PM DO NABEEL Nelson [facility]

## 2022-04-21 NOTE — TELEPHONE ENCOUNTER
----- Message from Marcia Alarcon MD sent at 4/21/2022  4:46 PM CDT -----  Schedule ov/vv with me to discuss labs

## 2022-04-25 LAB — ANA SER QL: NEGATIVE

## 2022-04-26 ENCOUNTER — OFFICE VISIT (OUTPATIENT)
Dept: FAMILY MEDICINE CLINIC | Facility: CLINIC | Age: 62
End: 2022-04-26
Payer: MEDICARE

## 2022-04-26 VITALS
WEIGHT: 183 LBS | HEIGHT: 63 IN | HEART RATE: 85 BPM | OXYGEN SATURATION: 98 % | RESPIRATION RATE: 18 BRPM | BODY MASS INDEX: 32.43 KG/M2 | SYSTOLIC BLOOD PRESSURE: 120 MMHG | TEMPERATURE: 98 F | DIASTOLIC BLOOD PRESSURE: 80 MMHG

## 2022-04-26 DIAGNOSIS — M79.10 MYALGIA: Primary | ICD-10-CM

## 2022-04-26 DIAGNOSIS — M79.7 FIBROMYALGIA: ICD-10-CM

## 2022-04-26 DIAGNOSIS — E78.5 HYPERLIPIDEMIA, UNSPECIFIED HYPERLIPIDEMIA TYPE: ICD-10-CM

## 2022-04-26 DIAGNOSIS — M25.50 ARTHRALGIA, UNSPECIFIED JOINT: ICD-10-CM

## 2022-04-26 DIAGNOSIS — E83.52 HYPERCALCEMIA: ICD-10-CM

## 2022-04-26 LAB — CALCIUM BLD-MCNC: 12.1 MG/DL (ref 8.5–10.1)

## 2022-04-26 PROCEDURE — 82310 ASSAY OF CALCIUM: CPT | Performed by: FAMILY MEDICINE

## 2022-04-26 PROCEDURE — 99214 OFFICE O/P EST MOD 30 MIN: CPT | Performed by: FAMILY MEDICINE

## 2022-04-26 PROCEDURE — 3008F BODY MASS INDEX DOCD: CPT | Performed by: FAMILY MEDICINE

## 2022-04-26 PROCEDURE — 3079F DIAST BP 80-89 MM HG: CPT | Performed by: FAMILY MEDICINE

## 2022-04-26 PROCEDURE — 3074F SYST BP LT 130 MM HG: CPT | Performed by: FAMILY MEDICINE

## 2022-04-26 PROCEDURE — 82330 ASSAY OF CALCIUM: CPT | Performed by: FAMILY MEDICINE

## 2022-04-27 ENCOUNTER — TELEPHONE (OUTPATIENT)
Dept: FAMILY MEDICINE CLINIC | Facility: CLINIC | Age: 62
End: 2022-04-27

## 2022-04-27 NOTE — TELEPHONE ENCOUNTER
Ramon Jenkins with [address] [facility] called wanted to know pt  Endo DR. Jose Rodarte Likens pt doesn't know the Andrew mary

## 2022-04-27 NOTE — TELEPHONE ENCOUNTER
I did not talk with endocrine but I called Dr. Girtha Aschoff office and nurse over there recommend that she they can not see her and recommend going to er.

## 2022-04-27 NOTE — TELEPHONE ENCOUNTER
Carl Fore calling back again asking for an update on this. She is wondering which Endocrinologist [place] spoke to regarding this patient so they can also call and speak with the same person.

## 2022-04-27 NOTE — TELEPHONE ENCOUNTER
Called Dr Leia Wood office and was advised that she normally sees Dr Chang Betts. Scheduled appt for tomorrow at 1:15.     [address]    Patient advised. Verbalized understanding. I previously scheduled her an appt with Dr Tito Osgood for May 9 at 1:15 before I called Dr [address] I canceled this one.

## 2022-04-27 NOTE — TELEPHONE ENCOUNTER
Called endocrien office to discuss richie level elevated. They recommend going to er. Called patient she is asymptomatic. Did discuss calcium level very high. She does no take any calcium supplement. Did recommend go to er. She will go to [facility] er and her  with take her. Recommend to go now. She vu.

## 2022-04-29 ENCOUNTER — TELEPHONE (OUTPATIENT)
Dept: FAMILY MEDICINE CLINIC | Facility: CLINIC | Age: 62
End: 2022-04-29

## 2022-04-29 LAB
CALCIUM IONIZED PH 7.4: 1.56 MMOL/L
CALCIUM IONIZED, SERUM: 1.62 MMOL/L

## 2022-05-17 ENCOUNTER — LAB ENCOUNTER (OUTPATIENT)
Dept: LAB | Age: 62
End: 2022-05-17
Attending: INTERNAL MEDICINE
Payer: MEDICARE

## 2022-05-17 DIAGNOSIS — E83.52 HYPERCALCEMIA: Primary | ICD-10-CM

## 2022-05-17 LAB
ALBUMIN SERPL-MCNC: 4 G/DL (ref 3.4–5)
ALBUMIN/GLOB SERPL: 1.3 {RATIO} (ref 1–2)
ALP LIVER SERPL-CCNC: 65 U/L
ALT SERPL-CCNC: 29 U/L
ANION GAP SERPL CALC-SCNC: 4 MMOL/L (ref 0–18)
AST SERPL-CCNC: 16 U/L (ref 15–37)
BILIRUB SERPL-MCNC: 0.6 MG/DL (ref 0.1–2)
BUN BLD-MCNC: 17 MG/DL (ref 7–18)
CALCIUM BLD-MCNC: 10.7 MG/DL (ref 8.5–10.1)
CHLORIDE SERPL-SCNC: 111 MMOL/L (ref 98–112)
CO2 SERPL-SCNC: 27 MMOL/L (ref 21–32)
CREAT BLD-MCNC: 0.8 MG/DL
FASTING STATUS PATIENT QL REPORTED: YES
GLOBULIN PLAS-MCNC: 3 G/DL (ref 2.8–4.4)
GLUCOSE BLD-MCNC: 93 MG/DL (ref 70–99)
OSMOLALITY SERPL CALC.SUM OF ELEC: 295 MOSM/KG (ref 275–295)
POTASSIUM SERPL-SCNC: 4.1 MMOL/L (ref 3.5–5.1)
PROT SERPL-MCNC: 7 G/DL (ref 6.4–8.2)
PTH-INTACT SERPL-MCNC: 74.2 PG/ML (ref 18.5–88)
SODIUM SERPL-SCNC: 142 MMOL/L (ref 136–145)
VIT D+METAB SERPL-MCNC: 60.1 NG/ML (ref 30–100)

## 2022-05-17 PROCEDURE — 80053 COMPREHEN METABOLIC PANEL: CPT

## 2022-05-17 PROCEDURE — 83970 ASSAY OF PARATHORMONE: CPT

## 2022-05-17 PROCEDURE — 36415 COLL VENOUS BLD VENIPUNCTURE: CPT

## 2022-05-17 PROCEDURE — 82306 VITAMIN D 25 HYDROXY: CPT

## 2022-05-18 ENCOUNTER — OFFICE VISIT (OUTPATIENT)
Dept: NEUROLOGY | Facility: CLINIC | Age: 62
End: 2022-05-18
Payer: MEDICARE

## 2022-05-18 VITALS
RESPIRATION RATE: 17 BRPM | BODY MASS INDEX: 32 KG/M2 | DIASTOLIC BLOOD PRESSURE: 71 MMHG | HEART RATE: 74 BPM | WEIGHT: 183 LBS | SYSTOLIC BLOOD PRESSURE: 129 MMHG

## 2022-05-18 DIAGNOSIS — M54.81 OCCIPITAL NEURALGIA OF RIGHT SIDE: ICD-10-CM

## 2022-05-18 DIAGNOSIS — G25.81 RESTLESS LEG SYNDROME: Primary | ICD-10-CM

## 2022-05-18 PROBLEM — C21.0 ANAL CARCINOMA (HCC): Status: RESOLVED | Noted: 2017-04-19 | Resolved: 2022-05-18

## 2022-05-18 PROBLEM — J44.89 ASTHMA WITH COPD (CHRONIC OBSTRUCTIVE PULMONARY DISEASE) (HCC): Chronic | Status: RESOLVED | Noted: 2018-04-05 | Resolved: 2022-05-18

## 2022-05-18 PROBLEM — M79.10 MYALGIA: Status: RESOLVED | Noted: 2020-11-30 | Resolved: 2022-05-18

## 2022-05-18 PROBLEM — E55.9 VITAMIN D DEFICIENCY: Status: RESOLVED | Noted: 2019-08-22 | Resolved: 2022-05-18

## 2022-05-18 PROBLEM — M79.7 FIBROMYALGIA: Status: RESOLVED | Noted: 2020-11-30 | Resolved: 2022-05-18

## 2022-05-18 PROBLEM — E66.01 SEVERE OBESITY (HCC): Status: RESOLVED | Noted: 2019-05-21 | Resolved: 2022-05-18

## 2022-05-18 PROBLEM — F43.9 STRESS: Status: RESOLVED | Noted: 2019-07-15 | Resolved: 2022-05-18

## 2022-05-18 PROBLEM — E66.9 OBESITY (BMI 30-39.9): Status: RESOLVED | Noted: 2020-03-17 | Resolved: 2022-05-18

## 2022-05-18 PROBLEM — Z51.81 ENCOUNTER FOR THERAPEUTIC DRUG MONITORING: Status: RESOLVED | Noted: 2019-05-21 | Resolved: 2022-05-18

## 2022-05-18 PROBLEM — E53.8 LOW VITAMIN B12 LEVEL: Status: RESOLVED | Noted: 2019-08-22 | Resolved: 2022-05-18

## 2022-05-18 PROBLEM — R79.89 LOW VITAMIN B12 LEVEL: Status: RESOLVED | Noted: 2019-08-22 | Resolved: 2022-05-18

## 2022-05-18 PROBLEM — J44.9 ASTHMA WITH COPD (CHRONIC OBSTRUCTIVE PULMONARY DISEASE) (HCC): Chronic | Status: RESOLVED | Noted: 2018-04-05 | Resolved: 2022-05-18

## 2022-05-18 PROBLEM — F90.9 ATTENTION DEFICIT HYPERACTIVITY DISORDER (ADHD): Status: RESOLVED | Noted: 2018-01-30 | Resolved: 2022-05-18

## 2022-05-18 PROBLEM — R20.2 PARESTHESIAS: Status: RESOLVED | Noted: 2020-12-15 | Resolved: 2022-05-18

## 2022-05-18 PROBLEM — N95.2 POST-MENOPAUSAL ATROPHIC VAGINITIS: Status: RESOLVED | Noted: 2021-03-02 | Resolved: 2022-05-18

## 2022-05-18 PROBLEM — J44.89 ASTHMA WITH COPD (CHRONIC OBSTRUCTIVE PULMONARY DISEASE): Chronic | Status: RESOLVED | Noted: 2018-04-05 | Resolved: 2022-05-18

## 2022-05-18 PROBLEM — M54.50 LOW BACK PAIN AT MULTIPLE SITES: Status: RESOLVED | Noted: 2020-11-30 | Resolved: 2022-05-18

## 2022-05-18 PROBLEM — M54.2 NECK PAIN: Status: RESOLVED | Noted: 2020-11-30 | Resolved: 2022-05-18

## 2022-05-18 PROCEDURE — 3074F SYST BP LT 130 MM HG: CPT | Performed by: OTHER

## 2022-05-18 PROCEDURE — 3078F DIAST BP <80 MM HG: CPT | Performed by: OTHER

## 2022-05-18 PROCEDURE — 99213 OFFICE O/P EST LOW 20 MIN: CPT | Performed by: OTHER

## 2022-05-18 RX ORDER — CARBAMAZEPINE 100 MG/1
100 TABLET, EXTENDED RELEASE ORAL 2 TIMES DAILY
Qty: 60 TABLET | Refills: 1 | Status: SHIPPED | OUTPATIENT
Start: 2022-05-18

## 2022-05-18 RX ORDER — PREGABALIN 75 MG/1
75 CAPSULE ORAL 2 TIMES DAILY
COMMUNITY

## 2022-05-18 RX ORDER — CHLORAL HYDRATE 500 MG
1000 CAPSULE ORAL DAILY
COMMUNITY

## 2022-05-18 NOTE — PATIENT INSTRUCTIONS
After your visit at the [place] office today,  please direct any follow up questions or medication needs to the staff in our [place] office so that your concerns may be promptly addressed. We are available through [employer] or at the numbers below:    The phone number is:   [phone] option #1    The fax number is:  [phone]    Your pharmacy should also send any requests electronically to the [place] office.

## 2022-05-18 NOTE — PROGRESS NOTES
Patient reports she wakes up every morning with a headaches everyday and she believes it is related to how she sleeps. Pt reports PT did not help with headache. RLS remains the same.

## 2022-05-23 ENCOUNTER — TELEPHONE (OUTPATIENT)
Dept: FAMILY MEDICINE CLINIC | Facility: CLINIC | Age: 62
End: 2022-05-23

## 2022-05-23 NOTE — TELEPHONE ENCOUNTER
Overdue mammogram  [facility] sent  No future appointments.
Claudication    DM (diabetes mellitus)    HLD (hyperlipidemia)    Hypertension

## 2022-06-07 DIAGNOSIS — E03.9 HYPOTHYROIDISM (ACQUIRED): ICD-10-CM

## 2022-06-08 RX ORDER — LEVOTHYROXINE SODIUM 88 UG/1
88 TABLET ORAL DAILY
Qty: 90 TABLET | Refills: 0 | Status: SHIPPED | OUTPATIENT
Start: 2022-06-08

## 2022-06-08 NOTE — TELEPHONE ENCOUNTER
LOV 4/26/22    Thyroid Supplements Protocol Passed 06/07/2022 05:35 PM   Protocol Details  TSH test in past 12 months    TSH value between 0.350 and 5.500 IU/ml    Appointment in past 12 or next 3 months     No future appointments. Rx sent.

## 2022-06-13 ENCOUNTER — LAB ENCOUNTER (OUTPATIENT)
Dept: LAB | Age: 62
End: 2022-06-13
Attending: INTERNAL MEDICINE
Payer: MEDICARE

## 2022-06-13 DIAGNOSIS — E83.52 HYPERCALCEMIA: Primary | ICD-10-CM

## 2022-06-13 PROCEDURE — 82340 ASSAY OF CALCIUM IN URINE: CPT

## 2022-06-13 PROCEDURE — 84392 ASSAY OF URINE SULFATE: CPT

## 2022-06-13 PROCEDURE — 82507 ASSAY OF CITRATE: CPT

## 2022-06-13 PROCEDURE — 83945 ASSAY OF OXALATE: CPT

## 2022-06-13 PROCEDURE — 82436 ASSAY OF URINE CHLORIDE: CPT

## 2022-06-18 LAB
CALCIUM URINE - PER 24H: 780 MG/D
CALCIUM URINE - PER VOL: 28.1 MG/DL
CHLORIDE URINE - PER 24H: 255 MMOL/D
CHLORIDE URINE - PER VOL.: 92 MMOL/L
CITRIC ACID, URINE - MG/DAY: 1116 MG/D
CITRIC ACID, URINE - MG/L: 402 MG/L
CREATININE, URINE - PER 24H: 1415 MG/D
CREATININE, URINE - PER VOLUME: 51 MG/DL
HOURS COLLECTED: 24 HR
MAGNESIUM URINE - PER 24H: 169 MG/D
MAGNESIUM URINE - PER VOL: 6.1 MG/DL
OXALATE, URINE - MG/DAY: 28 MG/D
OXALATE, URINE - MG/L: 10 MG/L
PH, URINE: 5.46
PHOSPHORUS URINE - PER 24H: 1443 MG/D
PHOSPHORUS URINE - PER VOL: 52 MG/DL
POTASSIUM URINE - PER 24H: 72 MMOL/D
POTASSIUM URINE - PER VOL.: 26 MMOL/L
SODIUM URINE - PER VOL.: 94 MMOL/L
SODIUM URINE -PER 24H: 261 MMOL/D
SULFATE URINE - PER 24H: 28 MMOL/D
SULFATE URINE - PER VOL: 10 MMOL/L
TOTAL VOLUME: 2775 ML
URIC ACID URINE - PER 24H: 652 MG/D
URIC ACID URINE - PER VOL: 23.5 MG/DL
URINE SUPERSATURATION, CAHPO4: 1.51
URINE SUPERSATURATION, CAOX: 6.61
URINE SUPERSATURATION, UA CALC: 0.89

## 2022-06-21 ENCOUNTER — TELEPHONE (OUTPATIENT)
Dept: NEUROLOGY | Facility: CLINIC | Age: 62
End: 2022-06-21

## 2022-06-21 NOTE — TELEPHONE ENCOUNTER
rec'd OV notes from Dr Rosa Coles with [address] [place] [address] 6/21/22; endorsed to nurse for review

## 2022-07-07 ENCOUNTER — HOSPITAL ENCOUNTER (OUTPATIENT)
Dept: PERIOP | Facility: HOSPITAL | Age: 62
Discharge: HOME | End: 2022-07-07
Attending: INTERNAL MEDICINE
Payer: COMMERCIAL

## 2022-07-07 ENCOUNTER — HOSPITAL ENCOUNTER (OUTPATIENT)
Dept: CT IMAGING | Facility: HOSPITAL | Age: 62
Discharge: HOME | End: 2022-07-07
Attending: INTERNAL MEDICINE
Payer: COMMERCIAL

## 2022-07-07 DIAGNOSIS — E21.0 HYPERPARATHYROIDISM, PRIMARY (HCC): ICD-10-CM

## 2022-07-07 LAB — CREAT BLD-MCNC: 0.9 MG/DL

## 2022-07-07 PROCEDURE — 82565 ASSAY OF CREATININE: CPT

## 2022-07-07 PROCEDURE — 70492 CT SFT TSUE NCK W/O & W/DYE: CPT | Performed by: INTERNAL MEDICINE

## 2022-07-26 DIAGNOSIS — G25.81 RESTLESS LEG SYNDROME: Primary | ICD-10-CM

## 2022-07-26 DIAGNOSIS — M54.81 OCCIPITAL NEURALGIA OF RIGHT SIDE: ICD-10-CM

## 2022-07-26 RX ORDER — TOPIRAMATE 25 MG/1
TABLET ORAL
Qty: 180 TABLET | Refills: 0 | Status: SHIPPED | OUTPATIENT
Start: 2022-07-26 | End: 2022-10-24

## 2022-07-26 NOTE — TELEPHONE ENCOUNTER
Last refilled on 4/21/22 for # 60 with 0 refills  Last OV 4/26/22  No future appointments. Thank you.

## 2022-07-27 RX ORDER — CARBAMAZEPINE 100 MG/1
TABLET, EXTENDED RELEASE ORAL
Qty: 60 TABLET | Refills: 1 | Status: SHIPPED | OUTPATIENT
Start: 2022-07-27

## 2022-07-31 DIAGNOSIS — E03.9 HYPOTHYROIDISM (ACQUIRED): ICD-10-CM

## 2022-08-01 RX ORDER — LEVOTHYROXINE SODIUM 88 UG/1
TABLET ORAL
Qty: 90 TABLET | Refills: 0 | Status: SHIPPED | OUTPATIENT
Start: 2022-08-01

## 2022-08-01 NOTE — TELEPHONE ENCOUNTER
LOV 4/26/22    Thyroid Supplements Protocol Passed 07/31/2022 12:00 PM   Protocol Details  TSH test in past 12 months    TSH value between 0.350 and 5.500 IU/ml    Appointment in past 12 or next 3 months     Future Appointments   Date Time Provider Marie Cerna   8/5/2022 10:30 AM [facility] [address]     Rx sent.

## 2022-08-05 ENCOUNTER — HOSPITAL ENCOUNTER (OUTPATIENT)
Dept: BONE DENSITY | Age: 62
Discharge: HOME | End: 2022-08-05
Attending: SURGERY
Payer: MEDICARE

## 2022-08-05 DIAGNOSIS — E21.3 HYPERPARATHYROIDISM (HCC): ICD-10-CM

## 2022-08-05 PROCEDURE — 77080 DXA BONE DENSITY AXIAL: CPT | Performed by: SURGERY

## 2022-08-25 ENCOUNTER — TELEPHONE (OUTPATIENT)
Dept: FAMILY MEDICINE CLINIC | Facility: CLINIC | Age: 62
End: 2022-08-25

## 2022-08-28 ENCOUNTER — LAB ENCOUNTER (OUTPATIENT)
Dept: LAB | Facility: HOSPITAL | Age: 62
End: 2022-08-28
Attending: SURGERY
Payer: MEDICARE

## 2022-08-28 DIAGNOSIS — Z20.822 ENCOUNTER FOR PREOPERATIVE SCREENING LABORATORY TESTING FOR COVID-19 VIRUS: ICD-10-CM

## 2022-08-28 DIAGNOSIS — Z01.812 ENCOUNTER FOR PREOPERATIVE SCREENING LABORATORY TESTING FOR COVID-19 VIRUS: ICD-10-CM

## 2022-08-29 LAB — SARS-COV-2 RNA RESP QL NAA+PROBE: NOT DETECTED

## 2022-08-30 ENCOUNTER — ANESTHESIA EVENT (OUTPATIENT)
Dept: SURGERY | Facility: HOSPITAL | Age: 62
End: 2022-08-30
Payer: MEDICARE

## 2022-08-30 ENCOUNTER — HOSPITAL ENCOUNTER (OUTPATIENT)
Facility: HOSPITAL | Age: 62
Setting detail: HOSPITAL OUTPATIENT SURGERY
Discharge: HOME | End: 2022-08-30
Attending: SURGERY | Admitting: SURGERY
Payer: MEDICARE

## 2022-08-30 ENCOUNTER — ANESTHESIA (OUTPATIENT)
Dept: SURGERY | Facility: HOSPITAL | Age: 62
End: 2022-08-30
Payer: MEDICARE

## 2022-08-30 VITALS
SYSTOLIC BLOOD PRESSURE: 147 MMHG | TEMPERATURE: 97 F | RESPIRATION RATE: 16 BRPM | HEART RATE: 89 BPM | OXYGEN SATURATION: 94 % | BODY MASS INDEX: 34.73 KG/M2 | DIASTOLIC BLOOD PRESSURE: 84 MMHG | WEIGHT: 196 LBS | HEIGHT: 63 IN

## 2022-08-30 DIAGNOSIS — Z20.822 ENCOUNTER FOR PREOPERATIVE SCREENING LABORATORY TESTING FOR COVID-19 VIRUS: Primary | ICD-10-CM

## 2022-08-30 DIAGNOSIS — Z01.812 ENCOUNTER FOR PREOPERATIVE SCREENING LABORATORY TESTING FOR COVID-19 VIRUS: Primary | ICD-10-CM

## 2022-08-30 LAB
PTH-INTACT SERPL-MCNC: 32.6 PG/ML
PTH-INTACT SERPL-MCNC: 88.8 PG/ML

## 2022-08-30 PROCEDURE — 88331 PATH CONSLTJ SURG 1 BLK 1SPC: CPT | Performed by: SURGERY

## 2022-08-30 PROCEDURE — 88305 TISSUE EXAM BY PATHOLOGIST: CPT | Performed by: SURGERY

## 2022-08-30 PROCEDURE — 0GTN0ZZ RESECTION OF RIGHT INFERIOR PARATHYROID GLAND, OPEN APPROACH: ICD-10-PCS | Performed by: SURGERY

## 2022-08-30 PROCEDURE — 83970 ASSAY OF PARATHORMONE: CPT | Performed by: SURGERY

## 2022-08-30 PROCEDURE — 4A11X4G MONITORING OF PERIPHERAL NERVOUS ELECTRICAL ACTIVITY, INTRAOPERATIVE, EXTERNAL APPROACH: ICD-10-PCS | Performed by: SURGERY

## 2022-08-30 RX ORDER — BUPIVACAINE HYDROCHLORIDE 5 MG/ML
INJECTION, SOLUTION EPIDURAL; INTRACAUDAL AS NEEDED
Status: DISCONTINUED | OUTPATIENT
Start: 2022-08-30 | End: 2022-08-30 | Stop reason: HOSPADM

## 2022-08-30 RX ORDER — GABAPENTIN ENACARBIL 300 MG/1
100 TABLET, EXTENDED RELEASE ORAL DAILY
COMMUNITY

## 2022-08-30 RX ORDER — LIDOCAINE HYDROCHLORIDE 10 MG/ML
INJECTION, SOLUTION EPIDURAL; INFILTRATION; INTRACAUDAL; PERINEURAL AS NEEDED
Status: DISCONTINUED | OUTPATIENT
Start: 2022-08-30 | End: 2022-08-30 | Stop reason: SURG

## 2022-08-30 RX ORDER — MIDAZOLAM HYDROCHLORIDE 1 MG/ML
INJECTION INTRAMUSCULAR; INTRAVENOUS AS NEEDED
Status: DISCONTINUED | OUTPATIENT
Start: 2022-08-30 | End: 2022-08-30 | Stop reason: SURG

## 2022-08-30 RX ORDER — ONDANSETRON 2 MG/ML
INJECTION INTRAMUSCULAR; INTRAVENOUS AS NEEDED
Status: DISCONTINUED | OUTPATIENT
Start: 2022-08-30 | End: 2022-08-30 | Stop reason: SURG

## 2022-08-30 RX ORDER — HYDROCODONE BITARTRATE AND ACETAMINOPHEN 5; 325 MG/1; MG/1
2 TABLET ORAL ONCE AS NEEDED
Status: DISCONTINUED | OUTPATIENT
Start: 2022-08-30 | End: 2022-08-30

## 2022-08-30 RX ORDER — HYDROCODONE BITARTRATE AND ACETAMINOPHEN 5; 325 MG/1; MG/1
1 TABLET ORAL EVERY 4 HOURS PRN
Qty: 10 TABLET | Refills: 0 | Status: SHIPPED | OUTPATIENT
Start: 2022-08-30

## 2022-08-30 RX ORDER — EPHEDRINE SULFATE 50 MG/ML
INJECTION INTRAVENOUS AS NEEDED
Status: DISCONTINUED | OUTPATIENT
Start: 2022-08-30 | End: 2022-08-30 | Stop reason: SURG

## 2022-08-30 RX ORDER — LABETALOL HYDROCHLORIDE 5 MG/ML
5 INJECTION, SOLUTION INTRAVENOUS EVERY 5 MIN PRN
Status: DISCONTINUED | OUTPATIENT
Start: 2022-08-30 | End: 2022-08-30

## 2022-08-30 RX ORDER — NALOXONE HYDROCHLORIDE 0.4 MG/ML
80 INJECTION, SOLUTION INTRAMUSCULAR; INTRAVENOUS; SUBCUTANEOUS AS NEEDED
Status: DISCONTINUED | OUTPATIENT
Start: 2022-08-30 | End: 2022-08-30

## 2022-08-30 RX ORDER — HYDROMORPHONE HYDROCHLORIDE 1 MG/ML
0.4 INJECTION, SOLUTION INTRAMUSCULAR; INTRAVENOUS; SUBCUTANEOUS EVERY 5 MIN PRN
Status: DISCONTINUED | OUTPATIENT
Start: 2022-08-30 | End: 2022-08-30

## 2022-08-30 RX ORDER — SODIUM CHLORIDE, SODIUM LACTATE, POTASSIUM CHLORIDE, CALCIUM CHLORIDE 600; 310; 30; 20 MG/100ML; MG/100ML; MG/100ML; MG/100ML
INJECTION, SOLUTION INTRAVENOUS CONTINUOUS
Status: DISCONTINUED | OUTPATIENT
Start: 2022-08-30 | End: 2022-08-30

## 2022-08-30 RX ORDER — SCOLOPAMINE TRANSDERMAL SYSTEM 1 MG/1
1 PATCH, EXTENDED RELEASE TRANSDERMAL
Status: DISCONTINUED | OUTPATIENT
Start: 2022-08-30 | End: 2022-08-30

## 2022-08-30 RX ORDER — ACETAMINOPHEN 500 MG
1000 TABLET ORAL ONCE AS NEEDED
Status: DISCONTINUED | OUTPATIENT
Start: 2022-08-30 | End: 2022-08-30

## 2022-08-30 RX ORDER — HYDROMORPHONE HYDROCHLORIDE 1 MG/ML
0.6 INJECTION, SOLUTION INTRAMUSCULAR; INTRAVENOUS; SUBCUTANEOUS EVERY 5 MIN PRN
Status: DISCONTINUED | OUTPATIENT
Start: 2022-08-30 | End: 2022-08-30

## 2022-08-30 RX ORDER — HYDROMORPHONE HYDROCHLORIDE 1 MG/ML
0.2 INJECTION, SOLUTION INTRAMUSCULAR; INTRAVENOUS; SUBCUTANEOUS EVERY 5 MIN PRN
Status: DISCONTINUED | OUTPATIENT
Start: 2022-08-30 | End: 2022-08-30

## 2022-08-30 RX ORDER — ACETAMINOPHEN 500 MG
1000 TABLET ORAL ONCE
Status: DISCONTINUED | OUTPATIENT
Start: 2022-08-30 | End: 2022-08-30 | Stop reason: HOSPADM

## 2022-08-30 RX ORDER — DEXAMETHASONE SODIUM PHOSPHATE 4 MG/ML
VIAL (ML) INJECTION AS NEEDED
Status: DISCONTINUED | OUTPATIENT
Start: 2022-08-30 | End: 2022-08-30 | Stop reason: SURG

## 2022-08-30 RX ORDER — ONDANSETRON 2 MG/ML
4 INJECTION INTRAMUSCULAR; INTRAVENOUS EVERY 6 HOURS PRN
Status: DISCONTINUED | OUTPATIENT
Start: 2022-08-30 | End: 2022-08-30

## 2022-08-30 RX ORDER — IBUPROFEN 800 MG/1
800 TABLET ORAL EVERY 8 HOURS PRN
Qty: 20 TABLET | Refills: 1 | Status: SHIPPED | OUTPATIENT
Start: 2022-08-30 | End: 2022-10-29

## 2022-08-30 RX ORDER — HYDROCODONE BITARTRATE AND ACETAMINOPHEN 5; 325 MG/1; MG/1
1 TABLET ORAL ONCE AS NEEDED
Status: DISCONTINUED | OUTPATIENT
Start: 2022-08-30 | End: 2022-08-30

## 2022-08-30 RX ADMIN — DEXAMETHASONE SODIUM PHOSPHATE 8 MG: 4 MG/ML VIAL (ML) INJECTION at 07:55:00

## 2022-08-30 RX ADMIN — EPHEDRINE SULFATE 10 MG: 50 INJECTION INTRAVENOUS at 07:56:00

## 2022-08-30 RX ADMIN — ONDANSETRON 4 MG: 2 INJECTION INTRAMUSCULAR; INTRAVENOUS at 08:47:00

## 2022-08-30 RX ADMIN — LIDOCAINE HYDROCHLORIDE 30 MG: 10 INJECTION, SOLUTION EPIDURAL; INFILTRATION; INTRACAUDAL; PERINEURAL at 07:42:00

## 2022-08-30 RX ADMIN — MIDAZOLAM HYDROCHLORIDE 2 MG: 1 INJECTION INTRAMUSCULAR; INTRAVENOUS at 07:37:00

## 2022-08-30 RX ADMIN — SODIUM CHLORIDE, SODIUM LACTATE, POTASSIUM CHLORIDE, CALCIUM CHLORIDE: 600; 310; 30; 20 INJECTION, SOLUTION INTRAVENOUS at 09:31:00

## 2022-08-30 RX ADMIN — SODIUM CHLORIDE, SODIUM LACTATE, POTASSIUM CHLORIDE, CALCIUM CHLORIDE: 600; 310; 30; 20 INJECTION, SOLUTION INTRAVENOUS at 07:37:00

## 2022-08-30 NOTE — ANESTHESIA POSTPROCEDURE EVALUATION
[address] Patient Status:  Hospital Outpatient Surgery   Age/Gender 58year old female MRN [id number]   Location [facility] Attending Irving Becerril MD   Hosp Day # 0 PCP Beata Thomas MD       Anesthesia Post-op Note    PARATHYROIDECTOMY WITH INTRAOPERATIVE ULTRASOUND, INTACT PARATHYROID HORMONE ASSAY, NERVE MONITORING    Procedure Summary     Date: 08/30/22 Room / Location: [facility] OR 03 / [facility] OR    Anesthesia Start: 0142 Anesthesia Stop: 7127    Procedure: PARATHYROIDECTOMY WITH INTRAOPERATIVE ULTRASOUND, INTACT PARATHYROID HORMONE ASSAY, NERVE MONITORING (N/A Neck) Diagnosis: (HYPERPARATHYROIDISM)    Surgeons: Irving Becerril MD Anesthesiologist: Alanna West MD    Anesthesia Type: general ASA Status: 2          Anesthesia Type: general    Vitals Value Taken Time   /82 08/30/22 0935   Temp 97.3 08/30/22 0935   Pulse 95 08/30/22 0935   Resp 16 08/30/22 0935   SpO2 99 08/30/22 0935       Patient Location: PACU    Anesthesia Type: general    Airway Patency: extubated    Postop Pain Control: adequate    Mental Status: mildly sedated but able to meaningfully participate in the post-anesthesia evaluation    Nausea/Vomiting: none    Cardiopulmonary/Hydration status: stable euvolemic    Complications: no apparent anesthesia related complications    Postop vital signs: stable    Dental Exam: Unchanged from Preop

## 2022-08-30 NOTE — ANESTHESIA PROCEDURE NOTES
Airway  Date/Time: 8/30/2022 7:43 AM  Urgency: elective    Airway not difficult    General Information and Staff    Patient location during procedure: OR  Anesthesiologist: Deepti Diehl MD  Performed: anesthesiologist     Indications and Patient Condition  Indications for airway management: anesthesia  Sedation level: deep  Preoxygenated: yes  Patient position: sniffing  Mask difficulty assessment: 1 - vent by mask    Final Airway Details  Final airway type: endotracheal airway      Successful airway: ETT  Cuffed: yes   Successful intubation technique: direct laryngoscopy  Endotracheal tube insertion site: oral  Blade: Jade  Blade size: #3  ETT size (mm): 6.0 (6.0 Nuvasive EMG ETT)    Cormack-Lehane Classification: grade IIB - view of arytenoids or posterior of glottis only  Placement verified by: chest auscultation and capnometry   Measured from: teeth  ETT to teeth (cm): 20  Number of attempts at approach: 1

## 2022-08-30 NOTE — BRIEF OP NOTE
Pre-Operative Diagnosis: HYPERPARATHYROIDISM     Post-Operative Diagnosis: HYPERPARATHYROIDISM      Procedure Performed:   PARATHYROIDECTOMY WITH INTRAOPERATIVE ULTRASOUND, INTACT PARATHYROID HORMONE ASSAY, NERVE MONITORING    Surgeon(s) and Role:     Fahad Hall MD - Primary    Assistant(s):  Surgical Assistant.: SREE Barragan     Surgical Findings: right inferior - cellular; 63% drop in intact PTH     Component      Latest Ref Rng & Units 8/30/2022 8/30/2022           8:32 AM  10' post RI px  8:15 AM  Pre-incision/excision   PTH INTACT      pg/mL 32.6 88.8     Specimen: parathyroid gland     Estimated Blood Loss: Blood Output: 5 mL (8/30/2022  9:15 AM)    Dictation Number:  Kelly Sweeney MD  8/30/2022  9:21 AM

## 2022-08-31 NOTE — OPERATIVE REPORT
[facility]    PATIENT'S NAME: [facility]   ATTENDING PHYSICIAN: Jeannie Jones M.D. OPERATING PHYSICIAN: Jeannie Jones M.D. PATIENT ACCOUNT#:   [de-identified]    LOCATION:  [place] [address]  MEDICAL RECORD #:   [id number]       YOB: 1960  ADMISSION DATE:       08/30/2022      OPERATION DATE:  08/30/2022    OPERATIVE REPORT    PREOPERATIVE DIAGNOSIS:  Hyperparathyroidism. POSTOPERATIVE DIAGNOSIS:  Hyperparathyroidism. PROCEDURE:  Parathyroidectomy with intraoperative ultrasound, intact PTH assay, and intraoperative frozen section and neuromonitoring. ANESTHESIA:  General.    ASSISTANT:  SREE Reaves. ESTIMATED BLOOD LOSS:  5 mL. SPECIMEN:  Parathyroid gland. DISPOSITION:  To PACU. COMPLICATIONS:  None. SURGICAL FINDINGS:  Right inferior parathyroid gland on cellular on frozen section, PTH levels 88.8 and 32.6, and a 63% drop in the intact PTH level. INDICATIONS:  Patient is a 79-year-old female, presented to the office with a diagnosis of hyperparathyroidism. She has been experiencing tiredness, fatigue,  frequent urination. She thought she might had kidney stones due to her flank pain. She also had some constipation. About 8 years ago, she underwent anal cancer treatment with chemoradiation. Her highest calcium was 12.1 at 1 point, and then on the most recent one, was 10.7, with a PTH of 74.2, and vitamin D level of 60.1. Ultrasound was done and able to visualize a right inferior parathyroid gland. She also had a 4D CT [facility] at [facility], which did not identify any parathyroid gland. Her bone density showed no evidence of osteoporosis, so based on these findings, discussed with the patient undergoing a parathyroidectomy. The risks and benefits of surgery were discussed, and patient agreed for the procedure and signed informed consent.   Also since the parathyroid gland was not completely localized and it was suspected parathyroid gland on the ultrasound, discussed with the patient that there is a possibility of 1 to 5%, of not able to identify the gland. OPERATIVE TECHNIQUE:  Patient was brought to the operating room, was placed in supine position on the operating table. Lower extremity SCD boots were placed. After IV sedation and neuromonitoring, and endotracheal tube placement, both arms tucked at side, a roll was placed over shoulder blade, neck was slightly hyperextended. Then the area was scanned and then again, was able to visualize right inferior parathyroid gland. Then after the area was prepped and draped into a sterile field, lower Kocher neck incision was made with a 15-blade knife. Electric Bovie cautery was used to separate the subcutaneous tissue, and superior and inferior flaps raised. Deep cervical fascia was identified. Midline incision was made and then the right sternum was carefully dissected from the thyroid capsule. The thyroid gland is atrophic and then as the thyroid gland was medially rotated posterior to the thyroid gland, the superior-inferior glands were identified. They were right next to each other. The superior 1 appeared to be normal.  The inferior was enlarged and densely attached to the thyroid capsule. It was carefully dissected from the capsule and released, and the blood vessel was identified. It was ligated using a 3-0 silk and the removed gland was sent down to Pathology for evaluation. There was 2 blood draws, 1 was pre-incision, pre-excision, and due to the difficult venous access, and then one 10-minute post excision of the gland, and there was a 63% drop in the intact PTH level and then the frozen section came back as a cellular hyperparathyroid. At this time, the area was inspected. There was no evidence of any bleeding. Good hemostasis noted and a piece of Surgicel laid and a 3-0 Vicryl was used to close the deep cervical dermal layer.   Local anesthetic was injected into the surrounding tissue and a 4-0 Monocryl for the skin placing a subcuticular stitch. Due to her allergies to adhesives, Dermabond was applied on the skin. The patient tolerated the procedure well, was extubated in the operating room, and transferred to PACU in stable condition. At the end the case, needle, instrument, and sponge counts were all correct. The surgical assistant was medically necessary for the entire procedure to position the patient, prep the area and then retract the wounds, and using instruments and assist in closing of the skin, and then transferring the patient out of the operating room.      Dictated By Chet Savage M.D.  d: 08/30/2022 09:28:21  t: 08/30/2022 18:31:01  Job [id number]  AE/

## 2022-09-16 ENCOUNTER — LAB ENCOUNTER (OUTPATIENT)
Dept: LAB | Age: 62
End: 2022-09-16
Attending: INTERNAL MEDICINE

## 2022-09-16 DIAGNOSIS — C21.0 ANAL CARCINOMA (HCC): ICD-10-CM

## 2022-09-16 DIAGNOSIS — C21.0 ANAL CARCINOMA (HCC): Primary | ICD-10-CM

## 2022-09-16 DIAGNOSIS — G25.81 RESTLESS LEG SYNDROME: ICD-10-CM

## 2022-09-16 LAB
ALBUMIN SERPL-MCNC: 4.1 G/DL (ref 3.4–5)
ALBUMIN/GLOB SERPL: 1.4 {RATIO} (ref 1–2)
ALP LIVER SERPL-CCNC: 84 U/L
ALT SERPL-CCNC: 33 U/L
ANION GAP SERPL CALC-SCNC: 5 MMOL/L (ref 0–18)
AST SERPL-CCNC: 17 U/L (ref 15–37)
BASOPHILS # BLD AUTO: 0.02 X10(3) UL (ref 0–0.2)
BASOPHILS NFR BLD AUTO: 0.6 %
BILIRUB SERPL-MCNC: 0.5 MG/DL (ref 0.1–2)
BUN BLD-MCNC: 13 MG/DL (ref 7–18)
CALCIUM BLD-MCNC: 9.4 MG/DL (ref 8.5–10.1)
CHLORIDE SERPL-SCNC: 110 MMOL/L (ref 98–112)
CO2 SERPL-SCNC: 27 MMOL/L (ref 21–32)
CREAT BLD-MCNC: 0.8 MG/DL
DEPRECATED HBV CORE AB SER IA-ACNC: 133.5 NG/ML
EOSINOPHIL # BLD AUTO: 0.15 X10(3) UL (ref 0–0.7)
EOSINOPHIL NFR BLD AUTO: 4.2 %
ERYTHROCYTE [DISTWIDTH] IN BLOOD BY AUTOMATED COUNT: 13.4 %
FASTING STATUS PATIENT QL REPORTED: YES
GFR SERPLBLD BASED ON 1.73 SQ M-ARVRAT: 83 ML/MIN/1.73M2 (ref 60–?)
GLOBULIN PLAS-MCNC: 2.9 G/DL (ref 2.8–4.4)
GLUCOSE BLD-MCNC: 95 MG/DL (ref 70–99)
HCT VFR BLD AUTO: 39.5 %
HGB BLD-MCNC: 12.9 G/DL
IMM GRANULOCYTES # BLD AUTO: 0.01 X10(3) UL (ref 0–1)
IMM GRANULOCYTES NFR BLD: 0.3 %
IRON SATN MFR SERPL: 30 %
IRON SERPL-MCNC: 105 UG/DL
LYMPHOCYTES # BLD AUTO: 1.19 X10(3) UL (ref 1–4)
LYMPHOCYTES NFR BLD AUTO: 33.2 %
MCH RBC QN AUTO: 29.7 PG (ref 26–34)
MCHC RBC AUTO-ENTMCNC: 32.7 G/DL (ref 31–37)
MCV RBC AUTO: 91 FL
MONOCYTES # BLD AUTO: 0.21 X10(3) UL (ref 0.1–1)
MONOCYTES NFR BLD AUTO: 5.9 %
NEUTROPHILS # BLD AUTO: 2 X10 (3) UL (ref 1.5–7.7)
NEUTROPHILS # BLD AUTO: 2 X10(3) UL (ref 1.5–7.7)
NEUTROPHILS NFR BLD AUTO: 55.8 %
OSMOLALITY SERPL CALC.SUM OF ELEC: 294 MOSM/KG (ref 275–295)
PLATELET # BLD AUTO: 218 10(3)UL (ref 150–450)
POTASSIUM SERPL-SCNC: 3.9 MMOL/L (ref 3.5–5.1)
PROT SERPL-MCNC: 7 G/DL (ref 6.4–8.2)
RBC # BLD AUTO: 4.34 X10(6)UL
SODIUM SERPL-SCNC: 142 MMOL/L (ref 136–145)
TIBC SERPL-MCNC: 346 UG/DL (ref 240–450)
TRANSFERRIN SERPL-MCNC: 232 MG/DL (ref 200–360)
WBC # BLD AUTO: 3.6 X10(3) UL (ref 4–11)

## 2022-09-16 PROCEDURE — 85025 COMPLETE CBC W/AUTO DIFF WBC: CPT

## 2022-09-16 PROCEDURE — 83550 IRON BINDING TEST: CPT

## 2022-09-16 PROCEDURE — 36415 COLL VENOUS BLD VENIPUNCTURE: CPT

## 2022-09-16 PROCEDURE — 83540 ASSAY OF IRON: CPT

## 2022-09-16 PROCEDURE — 80053 COMPREHEN METABOLIC PANEL: CPT

## 2022-09-16 PROCEDURE — 82728 ASSAY OF FERRITIN: CPT

## 2022-09-22 ENCOUNTER — OFFICE VISIT (OUTPATIENT)
Dept: HEMATOLOGY/ONCOLOGY | Age: 62
End: 2022-09-22
Attending: INTERNAL MEDICINE

## 2022-09-22 ENCOUNTER — SOCIAL WORK SERVICES (OUTPATIENT)
Dept: HEMATOLOGY/ONCOLOGY | Facility: HOSPITAL | Age: 62
End: 2022-09-22

## 2022-09-22 VITALS
HEART RATE: 92 BPM | WEIGHT: 192.69 LBS | BODY MASS INDEX: 34.14 KG/M2 | DIASTOLIC BLOOD PRESSURE: 89 MMHG | HEIGHT: 62.99 IN | TEMPERATURE: 98 F | SYSTOLIC BLOOD PRESSURE: 165 MMHG | OXYGEN SATURATION: 98 %

## 2022-09-22 DIAGNOSIS — E21.0 HYPERPARATHYROIDISM, PRIMARY (HCC): ICD-10-CM

## 2022-09-22 DIAGNOSIS — C21.0 ANAL CARCINOMA (HCC): Primary | ICD-10-CM

## 2022-09-22 PROCEDURE — 99214 OFFICE O/P EST MOD 30 MIN: CPT | Performed by: INTERNAL MEDICINE

## 2022-09-22 NOTE — PROGRESS NOTES
Pt here for follow up. She had recent parathyroid surgery.     Outpatient Oncology Care Plan  Problem list:  knowledge deficit    Problems related to:    disease/disease progression    Interventions:  provided general teaching    Expected outcomes:  understands plan of care    Progress towards outcome:  making progress    Education Record    Learner:  Patient  Barriers / Limitations:  None  Method:  Brief focused  Outcome:  Shows understanding  Comments:

## 2022-09-22 NOTE — PROGRESS NOTES
SW completed a PHQ9 screening (score 0 on #9). Chart reviewed. SW attempted to meet with pt and she declined SW at this time. Pt identified in the screening that she does not have thoughts to harm herself or suicidal ideation. SW to remain available to assist if needed. RN, Yoselin aware. Meli Burks, [facility]   at [address], [address] [facility] [address] [place] [address] Alonso Johnson  Ph: [phone]. [email or web address]  Fax: [phone]

## 2022-12-29 DIAGNOSIS — E03.9 HYPOTHYROIDISM (ACQUIRED): ICD-10-CM

## 2022-12-29 RX ORDER — LEVOTHYROXINE SODIUM 88 UG/1
TABLET ORAL
Qty: 90 TABLET | Refills: 0 | Status: SHIPPED | OUTPATIENT
Start: 2022-12-29

## 2023-02-06 ENCOUNTER — PATIENT OUTREACH (OUTPATIENT)
Dept: FAMILY MEDICINE CLINIC | Facility: CLINIC | Age: 63
End: 2023-02-06

## 2023-04-06 ENCOUNTER — OFFICE VISIT (OUTPATIENT)
Dept: FAMILY MEDICINE CLINIC | Facility: CLINIC | Age: 63
End: 2023-04-06
Payer: MEDICARE

## 2023-04-06 VITALS
DIASTOLIC BLOOD PRESSURE: 80 MMHG | BODY MASS INDEX: 36.8 KG/M2 | HEIGHT: 62 IN | SYSTOLIC BLOOD PRESSURE: 124 MMHG | RESPIRATION RATE: 18 BRPM | WEIGHT: 200 LBS | TEMPERATURE: 97 F | OXYGEN SATURATION: 98 % | HEART RATE: 63 BPM

## 2023-04-06 DIAGNOSIS — E03.9 HYPOTHYROIDISM (ACQUIRED): ICD-10-CM

## 2023-04-06 DIAGNOSIS — Z12.31 BREAST CANCER SCREENING BY MAMMOGRAM: ICD-10-CM

## 2023-04-06 DIAGNOSIS — G25.81 RESTLESS LEG SYNDROME: ICD-10-CM

## 2023-04-06 DIAGNOSIS — Z00.00 ANNUAL PHYSICAL EXAM: Primary | ICD-10-CM

## 2023-04-06 DIAGNOSIS — Z12.11 COLON CANCER SCREENING: ICD-10-CM

## 2023-04-06 DIAGNOSIS — E83.52 HYPERCALCEMIA: ICD-10-CM

## 2023-04-06 DIAGNOSIS — E78.5 HYPERLIPIDEMIA, UNSPECIFIED HYPERLIPIDEMIA TYPE: ICD-10-CM

## 2023-04-06 DIAGNOSIS — M54.12 CERVICAL RADICULOPATHY: ICD-10-CM

## 2023-04-06 LAB
ALBUMIN SERPL-MCNC: 4.2 G/DL (ref 3.4–5)
ALBUMIN/GLOB SERPL: 1.4 {RATIO} (ref 1–2)
ALP LIVER SERPL-CCNC: 67 U/L
ALT SERPL-CCNC: 33 U/L
ANION GAP SERPL CALC-SCNC: 7 MMOL/L (ref 0–18)
AST SERPL-CCNC: 17 U/L (ref 15–37)
BILIRUB SERPL-MCNC: 0.4 MG/DL (ref 0.1–2)
BUN BLD-MCNC: 14 MG/DL (ref 7–18)
CALCIUM BLD-MCNC: 10 MG/DL (ref 8.5–10.1)
CHLORIDE SERPL-SCNC: 105 MMOL/L (ref 98–112)
CHOLEST SERPL-MCNC: 336 MG/DL (ref ?–200)
CO2 SERPL-SCNC: 28 MMOL/L (ref 21–32)
CREAT BLD-MCNC: 0.76 MG/DL
ERYTHROCYTE [DISTWIDTH] IN BLOOD BY AUTOMATED COUNT: 13.2 %
FASTING PATIENT LIPID ANSWER: YES
FASTING STATUS PATIENT QL REPORTED: YES
GFR SERPLBLD BASED ON 1.73 SQ M-ARVRAT: 88 ML/MIN/1.73M2 (ref 60–?)
GLOBULIN PLAS-MCNC: 3.1 G/DL (ref 2.8–4.4)
GLUCOSE BLD-MCNC: 98 MG/DL (ref 70–99)
HCT VFR BLD AUTO: 41.3 %
HDLC SERPL-MCNC: 42 MG/DL (ref 40–59)
HGB BLD-MCNC: 13.7 G/DL
LDLC SERPL CALC-MCNC: 214 MG/DL (ref ?–100)
MCH RBC QN AUTO: 29.8 PG (ref 26–34)
MCHC RBC AUTO-ENTMCNC: 33.2 G/DL (ref 31–37)
MCV RBC AUTO: 89.8 FL
NONHDLC SERPL-MCNC: 294 MG/DL (ref ?–130)
OSMOLALITY SERPL CALC.SUM OF ELEC: 290 MOSM/KG (ref 275–295)
PLATELET # BLD AUTO: 226 10(3)UL (ref 150–450)
POTASSIUM SERPL-SCNC: 4.2 MMOL/L (ref 3.5–5.1)
PROT SERPL-MCNC: 7.3 G/DL (ref 6.4–8.2)
RBC # BLD AUTO: 4.6 X10(6)UL
SODIUM SERPL-SCNC: 140 MMOL/L (ref 136–145)
T4 FREE SERPL-MCNC: 0.7 NG/DL (ref 0.8–1.7)
TRIGL SERPL-MCNC: 379 MG/DL (ref 30–149)
TSI SER-ACNC: 4.56 MIU/ML (ref 0.36–3.74)
VLDLC SERPL CALC-MCNC: 87 MG/DL (ref 0–30)
WBC # BLD AUTO: 3.8 X10(3) UL (ref 4–11)

## 2023-04-06 PROCEDURE — 84439 ASSAY OF FREE THYROXINE: CPT | Performed by: FAMILY MEDICINE

## 2023-04-06 PROCEDURE — 84443 ASSAY THYROID STIM HORMONE: CPT | Performed by: FAMILY MEDICINE

## 2023-04-06 PROCEDURE — 85027 COMPLETE CBC AUTOMATED: CPT | Performed by: FAMILY MEDICINE

## 2023-04-06 PROCEDURE — 80061 LIPID PANEL: CPT | Performed by: FAMILY MEDICINE

## 2023-04-06 PROCEDURE — 80053 COMPREHEN METABOLIC PANEL: CPT | Performed by: FAMILY MEDICINE

## 2023-04-20 PROBLEM — E78.5 HYPERLIPIDEMIA: Status: ACTIVE | Noted: 2023-04-20

## 2023-04-20 PROBLEM — E83.52 HYPERCALCEMIA: Status: ACTIVE | Noted: 2023-04-20

## 2023-05-04 ENCOUNTER — OFFICE VISIT (OUTPATIENT)
Dept: INTERNAL MEDICINE CLINIC | Facility: CLINIC | Age: 63
End: 2023-05-04
Payer: MEDICARE

## 2023-05-04 VITALS
BODY MASS INDEX: 33.6 KG/M2 | HEART RATE: 86 BPM | HEIGHT: 63.5 IN | RESPIRATION RATE: 16 BRPM | DIASTOLIC BLOOD PRESSURE: 82 MMHG | OXYGEN SATURATION: 98 % | WEIGHT: 192 LBS | SYSTOLIC BLOOD PRESSURE: 118 MMHG

## 2023-05-04 DIAGNOSIS — E66.9 CLASS 1 OBESITY WITH SERIOUS COMORBIDITY AND BODY MASS INDEX (BMI) OF 33.0 TO 33.9 IN ADULT, UNSPECIFIED OBESITY TYPE: ICD-10-CM

## 2023-05-04 DIAGNOSIS — E03.9 HYPOTHYROIDISM (ACQUIRED): ICD-10-CM

## 2023-05-04 DIAGNOSIS — E88.81 INSULIN RESISTANCE: ICD-10-CM

## 2023-05-04 DIAGNOSIS — Z51.81 ENCOUNTER FOR THERAPEUTIC DRUG MONITORING: Primary | ICD-10-CM

## 2023-05-04 DIAGNOSIS — E78.5 HYPERLIPIDEMIA, UNSPECIFIED HYPERLIPIDEMIA TYPE: ICD-10-CM

## 2023-05-04 PROBLEM — E88.819 INSULIN RESISTANCE: Status: ACTIVE | Noted: 2023-05-04

## 2023-05-04 PROBLEM — E66.811 CLASS 1 OBESITY WITH SERIOUS COMORBIDITY AND BODY MASS INDEX (BMI) OF 33.0 TO 33.9 IN ADULT: Status: ACTIVE | Noted: 2019-05-21

## 2023-05-04 PROCEDURE — 3074F SYST BP LT 130 MM HG: CPT | Performed by: NURSE PRACTITIONER

## 2023-05-04 PROCEDURE — 3008F BODY MASS INDEX DOCD: CPT | Performed by: NURSE PRACTITIONER

## 2023-05-04 PROCEDURE — 99214 OFFICE O/P EST MOD 30 MIN: CPT | Performed by: NURSE PRACTITIONER

## 2023-05-04 PROCEDURE — 3079F DIAST BP 80-89 MM HG: CPT | Performed by: NURSE PRACTITIONER

## 2023-05-04 RX ORDER — CARBAMAZEPINE 100 MG/1
100 CAPSULE, EXTENDED RELEASE ORAL EVERY 12 HOURS
COMMUNITY
Start: 2023-04-01

## 2023-05-04 RX ORDER — METFORMIN HYDROCHLORIDE 750 MG/1
1500 TABLET, EXTENDED RELEASE ORAL
Qty: 60 TABLET | Refills: 2 | Status: SHIPPED | OUTPATIENT
Start: 2023-05-04

## 2023-05-08 DIAGNOSIS — G25.81 RESTLESS LEG SYNDROME: ICD-10-CM

## 2023-05-09 NOTE — TELEPHONE ENCOUNTER
Called pt to confirm if Carbamazeprine Er 100 mg was being prescribed by Dr Tatiana William from [facility]. Pt did not answer left voicemail.        Medication: CARBAMAZEPINE  MG Oral Tablet 12 Hr

## 2023-06-14 RX ORDER — CARBAMAZEPINE 100 MG/1
TABLET, EXTENDED RELEASE ORAL
Qty: 60 TABLET | Refills: 0 | OUTPATIENT
Start: 2023-06-14

## 2023-06-14 NOTE — TELEPHONE ENCOUNTER
CARBAMAZEPINE ER 100MG CAP 04/01/2023 11/04/2022  60 each  30 Danny, [address] . .. CARBAMAZEPINE ER 100MG CAP 02/11/2023 11/04/2022  60 each  30 Danny, [address]3 . .. CARBAMAZEPINE ER 100MG CAP 12/29/2022 11/04/2022  60 each  30 Xander Delgado      Medication refilled by outside provider.  Not seen in clinic since 2022

## 2023-07-02 DIAGNOSIS — E03.9 HYPOTHYROIDISM (ACQUIRED): ICD-10-CM

## 2023-07-03 RX ORDER — LEVOTHYROXINE SODIUM 0.1 MG/1
TABLET ORAL
Qty: 90 TABLET | Refills: 0 | Status: SHIPPED | OUTPATIENT
Start: 2023-07-03

## 2023-07-20 ENCOUNTER — TELEPHONE (OUTPATIENT)
Dept: FAMILY MEDICINE CLINIC | Facility: CLINIC | Age: 63
End: 2023-07-20

## 2023-07-20 NOTE — TELEPHONE ENCOUNTER
Mammogram due  [facility] sent  Future Appointments   Date Time Provider Marie Cerna   8/25/2023 10:40 AM CLAUDIA Rodriguez [facility]

## 2023-07-24 ENCOUNTER — TELEPHONE (OUTPATIENT)
Dept: FAMILY MEDICINE CLINIC | Facility: CLINIC | Age: 63
End: 2023-07-24

## 2023-08-28 DIAGNOSIS — E03.9 HYPOTHYROIDISM (ACQUIRED): ICD-10-CM

## 2023-08-28 RX ORDER — LEVOTHYROXINE SODIUM 0.1 MG/1
TABLET ORAL
Qty: 90 TABLET | Refills: 0 | Status: SHIPPED | OUTPATIENT
Start: 2023-08-28

## 2023-09-06 ENCOUNTER — TELEPHONE (OUTPATIENT)
Dept: FAMILY MEDICINE CLINIC | Facility: CLINIC | Age: 63
End: 2023-09-06

## 2023-12-15 ENCOUNTER — TELEPHONE (OUTPATIENT)
Dept: FAMILY MEDICINE CLINIC | Facility: CLINIC | Age: 63
End: 2023-12-15

## 2024-01-10 ENCOUNTER — PATIENT OUTREACH (OUTPATIENT)
Dept: FAMILY MEDICINE CLINIC | Facility: CLINIC | Age: 64
End: 2024-01-10

## 2024-01-14 DIAGNOSIS — E03.9 HYPOTHYROIDISM (ACQUIRED): ICD-10-CM

## 2024-01-15 RX ORDER — LEVOTHYROXINE SODIUM 0.1 MG/1
100 TABLET ORAL
Qty: 90 TABLET | Refills: 0 | Status: SHIPPED | OUTPATIENT
Start: 2024-01-15

## 2024-01-15 NOTE — TELEPHONE ENCOUNTER
Thyroid Supplements Protocol [id number]/14/2024 11:53 PM   Protocol Details TSH test in past 12 months    TSH value between 0.350 and 5.500 IU/ml    Appointment in past 12 or next 3 months

## 2024-02-02 ENCOUNTER — PATIENT OUTREACH (OUTPATIENT)
Dept: FAMILY MEDICINE CLINIC | Facility: CLINIC | Age: 64
End: 2024-02-02

## 2024-04-24 DIAGNOSIS — E03.9 HYPOTHYROIDISM (ACQUIRED): ICD-10-CM

## 2024-04-24 NOTE — TELEPHONE ENCOUNTER
Levothyroxine last refilled 1/15/24  Future appt with  6/4/24  LOV with  4/26/22  Last TSH 4/6/23  Routed to advise    Thyroid Medication Protocol Jcdncl1204/24/2024 04:36 PM   Protocol Details TSH in past 12 months    Last TSH value is normal    In person appointment or virtual visit in the past 12 mos or appointment in next 3 mos

## 2024-04-25 RX ORDER — LEVOTHYROXINE SODIUM 0.1 MG/1
100 TABLET ORAL
Qty: 90 TABLET | Refills: 0 | Status: SHIPPED | OUTPATIENT
Start: 2024-04-25

## 2024-06-04 ENCOUNTER — LAB ENCOUNTER (OUTPATIENT)
Dept: LAB | Age: 64
End: 2024-06-04
Attending: FAMILY MEDICINE
Payer: MEDICARE

## 2024-06-04 ENCOUNTER — OFFICE VISIT (OUTPATIENT)
Dept: FAMILY MEDICINE CLINIC | Facility: CLINIC | Age: 64
End: 2024-06-04

## 2024-06-04 VITALS
TEMPERATURE: 97 F | WEIGHT: 201 LBS | RESPIRATION RATE: 16 BRPM | HEIGHT: 63.5 IN | OXYGEN SATURATION: 98 % | HEART RATE: 75 BPM | DIASTOLIC BLOOD PRESSURE: 76 MMHG | BODY MASS INDEX: 35.17 KG/M2 | SYSTOLIC BLOOD PRESSURE: 132 MMHG

## 2024-06-04 DIAGNOSIS — E83.52 HYPERCALCEMIA: ICD-10-CM

## 2024-06-04 DIAGNOSIS — Z90.89 S/P PARATHYROIDECTOMY: ICD-10-CM

## 2024-06-04 DIAGNOSIS — E03.9 HYPOTHYROIDISM (ACQUIRED): ICD-10-CM

## 2024-06-04 DIAGNOSIS — M54.12 CERVICAL RADICULOPATHY: ICD-10-CM

## 2024-06-04 DIAGNOSIS — Z12.4 CERVICAL CANCER SCREENING: ICD-10-CM

## 2024-06-04 DIAGNOSIS — Z01.419 ENCOUNTER FOR ANNUAL ROUTINE GYNECOLOGICAL EXAMINATION: ICD-10-CM

## 2024-06-04 DIAGNOSIS — Z12.11 COLON CANCER SCREENING: ICD-10-CM

## 2024-06-04 DIAGNOSIS — E78.5 HYPERLIPIDEMIA, UNSPECIFIED HYPERLIPIDEMIA TYPE: ICD-10-CM

## 2024-06-04 DIAGNOSIS — R00.2 PALPITATIONS: ICD-10-CM

## 2024-06-04 DIAGNOSIS — Z98.890 S/P PARATHYROIDECTOMY: ICD-10-CM

## 2024-06-04 DIAGNOSIS — Z00.00 ANNUAL PHYSICAL EXAM: ICD-10-CM

## 2024-06-04 DIAGNOSIS — Z85.048 HISTORY OF ANAL CANCER: ICD-10-CM

## 2024-06-04 DIAGNOSIS — E66.9 CLASS 1 OBESITY WITH SERIOUS COMORBIDITY AND BODY MASS INDEX (BMI) OF 33.0 TO 33.9 IN ADULT, UNSPECIFIED OBESITY TYPE: ICD-10-CM

## 2024-06-04 DIAGNOSIS — G25.81 RESTLESS LEG SYNDROME: ICD-10-CM

## 2024-06-04 DIAGNOSIS — Z00.00 ANNUAL PHYSICAL EXAM: Primary | ICD-10-CM

## 2024-06-04 DIAGNOSIS — Z12.31 BREAST CANCER SCREENING BY MAMMOGRAM: ICD-10-CM

## 2024-06-04 PROBLEM — Z51.81 ENCOUNTER FOR THERAPEUTIC DRUG MONITORING: Status: RESOLVED | Noted: 2019-05-21 | Resolved: 2024-06-04

## 2024-06-04 PROBLEM — E88.819 INSULIN RESISTANCE: Status: RESOLVED | Noted: 2023-05-04 | Resolved: 2024-06-04

## 2024-06-04 LAB
ALBUMIN SERPL-MCNC: 4 G/DL (ref 3.4–5)
ALBUMIN/GLOB SERPL: 1.2 {RATIO} (ref 1–2)
ALP LIVER SERPL-CCNC: 79 U/L
ALT SERPL-CCNC: 25 U/L
ANION GAP SERPL CALC-SCNC: 5 MMOL/L (ref 0–18)
AST SERPL-CCNC: 14 U/L (ref 15–37)
BILIRUB SERPL-MCNC: 0.6 MG/DL (ref 0.1–2)
BUN BLD-MCNC: 18 MG/DL (ref 9–23)
CALCIUM BLD-MCNC: 9.9 MG/DL (ref 8.5–10.1)
CHLORIDE SERPL-SCNC: 107 MMOL/L (ref 98–112)
CHOLEST SERPL-MCNC: 232 MG/DL (ref ?–200)
CO2 SERPL-SCNC: 28 MMOL/L (ref 21–32)
CREAT BLD-MCNC: 0.85 MG/DL
EGFRCR SERPLBLD CKD-EPI 2021: 76 ML/MIN/1.73M2 (ref 60–?)
ERYTHROCYTE [DISTWIDTH] IN BLOOD BY AUTOMATED COUNT: 13.7 %
EST. AVERAGE GLUCOSE BLD GHB EST-MCNC: 114 MG/DL (ref 68–126)
FASTING PATIENT LIPID ANSWER: YES
FASTING STATUS PATIENT QL REPORTED: YES
GLOBULIN PLAS-MCNC: 3.3 G/DL (ref 2.8–4.4)
GLUCOSE BLD-MCNC: 103 MG/DL (ref 70–99)
HBA1C MFR BLD: 5.6 % (ref ?–5.7)
HCT VFR BLD AUTO: 42.6 %
HDLC SERPL-MCNC: 44 MG/DL (ref 40–59)
HGB BLD-MCNC: 14.4 G/DL
LDLC SERPL CALC-MCNC: 158 MG/DL (ref ?–100)
MCH RBC QN AUTO: 29.3 PG (ref 26–34)
MCHC RBC AUTO-ENTMCNC: 33.8 G/DL (ref 31–37)
MCV RBC AUTO: 86.8 FL
NONHDLC SERPL-MCNC: 188 MG/DL (ref ?–130)
OSMOLALITY SERPL CALC.SUM OF ELEC: 292 MOSM/KG (ref 275–295)
PLATELET # BLD AUTO: 270 10(3)UL (ref 150–450)
POTASSIUM SERPL-SCNC: 4.1 MMOL/L (ref 3.5–5.1)
PROT SERPL-MCNC: 7.3 G/DL (ref 6.4–8.2)
RBC # BLD AUTO: 4.91 X10(6)UL
SODIUM SERPL-SCNC: 140 MMOL/L (ref 136–145)
TRIGL SERPL-MCNC: 165 MG/DL (ref 30–149)
TSI SER-ACNC: 0.47 MIU/ML (ref 0.36–3.74)
VLDLC SERPL CALC-MCNC: 32 MG/DL (ref 0–30)
WBC # BLD AUTO: 4.7 X10(3) UL (ref 4–11)

## 2024-06-04 PROCEDURE — 3078F DIAST BP <80 MM HG: CPT | Performed by: FAMILY MEDICINE

## 2024-06-04 PROCEDURE — 80053 COMPREHEN METABOLIC PANEL: CPT

## 2024-06-04 PROCEDURE — 83036 HEMOGLOBIN GLYCOSYLATED A1C: CPT

## 2024-06-04 PROCEDURE — 96160 PT-FOCUSED HLTH RISK ASSMT: CPT | Performed by: FAMILY MEDICINE

## 2024-06-04 PROCEDURE — 36415 COLL VENOUS BLD VENIPUNCTURE: CPT

## 2024-06-04 PROCEDURE — 84443 ASSAY THYROID STIM HORMONE: CPT

## 2024-06-04 PROCEDURE — G0439 PPPS, SUBSEQ VISIT: HCPCS | Performed by: FAMILY MEDICINE

## 2024-06-04 PROCEDURE — 99499 UNLISTED E&M SERVICE: CPT | Performed by: FAMILY MEDICINE

## 2024-06-04 PROCEDURE — 93000 ELECTROCARDIOGRAM COMPLETE: CPT | Performed by: FAMILY MEDICINE

## 2024-06-04 PROCEDURE — 3075F SYST BP GE 130 - 139MM HG: CPT | Performed by: FAMILY MEDICINE

## 2024-06-04 PROCEDURE — 85027 COMPLETE CBC AUTOMATED: CPT

## 2024-06-04 PROCEDURE — 80061 LIPID PANEL: CPT

## 2024-06-04 NOTE — PATIENT INSTRUCTIONS
Exercise for a Healthier Heart     Exercise with a friend. When activity is fun, you're more likely to stick with it.   You may wonder how you can improve the health of your heart. If you’re thinking about exercise, you’re on the right track. You don’t need to become an athlete, but you do need a certain amount of brisk exercise to help strengthen your heart. If you have been diagnosed with a heart condition, your doctor may recommend exercise to help stabilize your condition. To help make exercise a habit, choose safe, fun activities.  Be sure to check with your healthcare provider before starting an exercise program.  Why exercise?  Exercising regularly offers many healthy rewards. It can help you do all of the following:  Improve your blood cholesterol level to help prevent further heart trouble  Lower your blood pressure to help prevent a stroke or heart attack  Control diabetes, or reduce your risk of getting this disease  Improve your heart and lung function  Reach and maintain a healthy weight  Make your muscles stronger and more limber so you can stay active  Prevent falls and fractures by slowing the loss of bone mass (osteoporosis)  Manage stress better  Reduce your blood pressure  Improve your sense of self and your body image  Exercise tips  Ease into your routine. Set small goals. Then build on them.  Exercise on most days. Aim for a total of 150 or more minutes of moderate to  vigorous intensity activity each week. Consider 40 minutes, 3 to 4 times a week. For best results, activity should last for 40 minutes on average. It is OK to work up to the 40 minute period over time. Examples of moderate-intensity activity is walking 1 mile in 15 minutes or 30 to 45 minutes of yard work.  Step up your daily activity level. Along with your exercise program, try being more active throughout the day. Walk instead of drive. Do more household tasks or yard work.  Choose one or more activities you enjoy. Walking is  one of the easiest things you can do. You can also try swimming, riding a bike, dancing, or taking an exercise class.  Stop exercising and call your doctor if you:  Have chest pain or feel dizzy or lightheaded  Feel burning, tightness, pressure, or heaviness in your chest, neck, shoulders, back, or arms  Have unusual shortness of breath  Have increased joint or muscle pain  Have palpitations or an irregular heartbeat  Date Last Reviewed: 5/1/2016  © 3138-5394 [employer]. [address] [place] [address]. All rights reserved. This information is not intended as a substitute for professional medical care. Always follow your healthcare professional's instructions.           4 Steps for Eating Healthier  Changing the way you eat can improve your health. It can lower your cholesterol and blood pressure, and help you stay at a healthy weight. Your diet doesn’t have to be bland and boring to be healthy. Just watch your calories and follow these steps:    Step 1. Eat fewer unhealthy fats  Choose more fish and lean meats instead of fatty cuts of meat.  Skip butter and lard, and use less margarine.  Pass on foods that have palm, coconut, or hydrogenated oils.  Eat fewer high-fat dairy foods like cheese, ice cream, and whole milk.  Get a heart-healthy cookbook and try some low-fat recipes.     Step 2. Go light on salt  Keep the saltshaker off the table.  Limit high-salt ingredients, such as soy sauce, bouillon, and garlic salt.  Instead of adding salt when cooking, season your food with herbs and flavorings. Try lemon, garlic, and onion, or salt-free herb seasonings.  Limit convenience foods, such as boxed or canned foods and restaurant food.  Read food labels and choose lower-sodium options.     Step 3. Limit sugar  Pause before you add sugars to pancakes, cereal, coffee, or tea. This includes white and brown table sugar, syrup, honey, and molasses. Cut your usual amount by half.  Use non-sugar  sweeteners. Stevia, aspartame, and sucralose can satisfy a sweet tooth without adding calories.  Swap out sugar-filled soda and other drinks. Buy sugar-free or low-calorie beverages. Remember water is always the best choice.  Read labels and choose foods with less added sugar. Keep in mind that dairy foods and foods with fruit will have some natural sugar.  Cut the sugar in recipes by 1/3 to 1/2. Boost the flavor with extracts like almond, vanilla, or orange. Or add spices such as cinnamon or nutmeg.     Step 4. Eat more fiber  Eat fresh fruits and vegetables every day.  Boost your diet with whole grains. Go for oats, whole-grain rice, and bran.  Add beans and lentils to your meals.  Drink more water to match your fiber increase to help prevent constipation.     Date Last Reviewed: 6/1/2017  © 8611-1066 [employer] [address] [place] [address]. All rights reserved. This information is not intended as a substitute for professional medical care. Always follow your healthcare professional's instructions.           Understanding Coronary Calcium Scan   A coronary calcium scan is a test that looks at the arteries that supply blood to the heart muscle. These are called the coronary arteries. The scan checks for calcified plaque buildup along the walls of these arteries. Plaque can be made up of calcium, fat, cholesterol, and other substances. A buildup of plaque narrows the arteries. This affects the flow of blood to the heart muscle. If a coronary artery becomes completely blocked, a heart attack (death of part of the heart muscle) can occur. Knowing how much plaque you have in your arteries can help figure out your risk for a heart attack.   Why do I need a coronary calcium scan?   A coronary calcium scan measures the amount of calcium in the arteries. The presence of calcium deposits in an artery means that plaque is starting to build up. The results of the test are given as a calcium score.  The scan can help predict your risk of having a heart attack, even before symptoms appear.   This scan isn’t for everyone. It's most useful when considered along with other risk factors for a heart attack. These include family history of heart disease, high blood pressure, and unhealthy cholesterol.   What happens during a coronary calcium scan?   The scan is done using computed tomography (CT). This test uses X-rays and computers to create detailed images of the heart.     For the test, you lie on a platform that slides into a tube. During the scan:   You will need to stay very still.  You may be asked to hold your breath for short periods of time.  You may have small, sticky discs attached to wires (electrodes) on your chest to record your heartbeat.  The test will likely take about 10 minutes. Once the test is done and your appointment is finished, you can go back to your normal routine.   What are the risks of coronary calcium scan?   A CT scan exposes you to a certain amount of radiation. The benefits of the scan likely outweigh the risks for you. You and your healthcare provider can discuss this further.   Maddi last reviewed this educational content on 5/1/2022 © 2000-2024 [employer]. All rights reserved. This information is not intended as a substitute for professional medical care. Always follow your healthcare professional's instructions.    Call at  to schedule.

## 2024-06-04 NOTE — PROGRESS NOTES
Subjective:   Monica Holguin is a 64 year old female who presents for a Medicare Subsequent Annual Wellness visit (Pt already had Initial Annual Wellness) and scheduled follow up of multiple significant but stable problems.   Last colonoscopy was 2018 repeat in 3 yrs. Dr. Ayon.   Restless leg syndrome seeing neuro. She is seeing [facility] specialist and is on horizant (gabapentin).   Colonoscopy she is aware she needs one. Information given.  Hematology Dr. Trujillo she will follow up.  She got parathyroid. She is not seeing endocrine now done with follow up.  Weight loss she is seeing weight loss clinic. She was on metformin but se diarrhea so not taking it. She is doing diet and exercise.   Hypothyroid seeing endocrine on meds.   Pap in 2021 .  Vision is good got new glasses  Hearing is good.  She had a recent fall due to low stamina and feels tired. No head injury. Last fall was 1 month ago.   Sleep is 5-6 hr at night. No snoring. No stopped breathing at night time. She had add in past and was on stratera in past.   Depression well controlled. No meds she decline therapist. No si/hi.   Heart scan 10 yrs normal.   She has some palpitation on and off mostly at rest it can happen anytime last for few sec.       History/Other:   Fall Risk Assessment:   She has been screened for Falls and is High Risk. Fall Prevention information provided to patient in After Visit Summary.    Do you feel unsteady when standing or walking?: Yes  Do you worry about falling?: No  Have you fallen in the past year?: Yes     Cognitive Assessment:   She had a completely normal cognitive assessment - see flowsheet entries       Functional Ability/Status:   Monica Holguin has a completely normal functional assessment. See flowsheet for details.      Depression Screening (PHQ-2/PHQ-9): PHQ-2 SCORE: 1  , done 6/4/2024   Little interest or pleasure in doing things: 1             Advanced Directives:   She does NOT have a Living  Will. [Do you have a living will?: No]  She does NOT have a Power of  for Health Care. [Do you have a healthcare power of ?: No]        Patient Active Problem List   Diagnosis    Hypothyroidism (acquired)    Class 1 obesity with serious comorbidity and body mass index (BMI) of 33.0 to 33.9 in adult    Restless leg syndrome    Cervical radiculopathy    Hypercalcemia    Hyperlipidemia     Allergies:  She is allergic to chlorhexidine, citalopram, etomidate, gabapentin, lexapro [escitalopram], mold, quetiapine fumarate, dust, ropinirole, adhesive tape, and promethazine.    Current Medications:  Outpatient Medications Marked as Taking for the 24 encounter (Office Visit) with Aimee Chappell MD   Medication Sig    levothyroxine 100 MCG Oral Tab Take 1 tablet (100 mcg total) by mouth before breakfast.    Gabapentin Enacarbil ER (HORIZANT) 300 MG Oral Tab CR Take 100 mg by mouth daily.    B Complex Vitamins (VITAMIN-B COMPLEX OR) Take by mouth daily.    omega-3 fatty acids 1000 MG Oral Cap Take 1,000 mg by mouth 2 (two) times daily.    NON FORMULARY every other day. Apricot seeds 3 per day       Medical History:  She  has a past medical history of Anal cancer (HCC) (), Anal cancer (HCC) (), Anesthesia complication, Anxiety state, Asthma (HCC), Back problem, Bipolar disorder (HCC), Blood disorder, Depression, Disorder of thyroid, Exposure to radiation, Fibromyalgia, History of blood transfusion (), motion sickness, Hypothyroidism (acquired), Migraines, Neoplasm, breast, Personal history of antineoplastic chemotherapy, PONV (postoperative nausea and vomiting), Restless leg syndrome, Restless leg syndrome, and Visual impairment.  Surgical History:  She  has a past surgical history that includes jacque biopsy stereo nodule 2 site bilat (cpt=19081/37686); jacque biopsy stereo nodule 1 site right (cpt=19081); ; other; other; other (2017); colonoscopy (N/A, 11/10/2017); colonoscopy (N/A,  12/5/2018); jacque localization wire 1 site left (cpt=19281); jacque localization wire 1 site right (cpt=19281); and other surgical history (10/2020).   Family History:  Her family history includes Cancer in her father and self; Thyroid disease in her sister; anuyrsem in her mother.  Social History:  She  reports that she has never smoked. She has never used smokeless tobacco. She reports current alcohol use. She reports that she does not use drugs.    Tobacco:  She has never smoked tobacco.    CAGE Alcohol Screen:   CAGE screening score of 0 on 6/4/2024, showing low risk of alcohol abuse.      Patient Care Team:  Aimee Chappell MD as PCP - General (Family Medicine)  Harris Mayo RD (Dietitian)  Kenia Araujo APRN (Nurse Practitioner)  Marcin Haji DO as Consulting Physician (NEUROLOGY)  Valarie Pandey MD as Obstetrician (OBSTETRICS & GYNECOLOGY)    Review of Systems   Constitutional:  Negative for fever.   HENT:  Negative for congestion.    Respiratory:  Negative for cough, shortness of breath, wheezing and stridor.    Cardiovascular:  Negative for chest pain, palpitations and leg swelling.   Gastrointestinal:  Negative for abdominal pain, blood in stool, constipation and diarrhea.   Genitourinary:  Negative for difficulty urinating.   Neurological:  Negative for dizziness.         Objective:   Physical Exam  Constitutional:       General: She is not in acute distress.     Appearance: She is not ill-appearing.   HENT:      Right Ear: Tympanic membrane normal.      Left Ear: Tympanic membrane normal.      Mouth/Throat:      Mouth: Mucous membranes are moist.      Pharynx: No oropharyngeal exudate or posterior oropharyngeal erythema.   Cardiovascular:      Rate and Rhythm: Normal rate and regular rhythm.      Pulses: Normal pulses.      Heart sounds: Normal heart sounds.   Pulmonary:      Effort: Pulmonary effort is normal.      Breath sounds: Normal breath sounds.   Abdominal:      Palpations: Abdomen is soft.       Tenderness: There is no abdominal tenderness. There is no guarding or rebound.   Musculoskeletal:      Right lower leg: No edema.      Left lower leg: No edema.   Skin:     General: Skin is warm.      Capillary Refill: Capillary refill takes less than 2 seconds.   Neurological:      Mental Status: She is alert.           /76   Pulse 75   Temp 96.5 °F (35.8 °C)   Resp 16   Ht 5' 3.5\" (1.613 m)   Wt 201 lb (91.2 kg)   SpO2 98%   BMI 35.05 kg/m²  Estimated body mass index is 35.05 kg/m² as calculated from the following:    Height as of this encounter: 5' 3.5\" (1.613 m).    Weight as of this encounter: 201 lb (91.2 kg).    Medicare Hearing Assessment:   Hearing Screening    Time taken: 6/4/2024  8:52 AM  Screening Method: Finger Rub         Visual Acuity:   Right Eye Visual Acuity: Corrected Right Eye Chart Acuity: 20/25   Left Eye Visual Acuity: Corrected Left Eye Chart Acuity: 20/25   Both Eyes Visual Acuity: Corrected Both Eyes Chart Acuity: 20/25   Able To Tolerate Visual Acuity: Yes        Assessment & Plan:   Monica Holguin is a 64 year old female who presents for a Medicare Assessment.     1. Annual physical exam (Primary)  Labs order  -     CBC, Platelet; No Differential; Future; Expected date: 06/04/2024  -     Comp Metabolic Panel (14); Future; Expected date: 06/04/2024  -     TSH W Reflex To Free T4; Future; Expected date: 06/04/2024  -     Lipid Panel; Future; Expected date: 06/04/2024  -     Hemoglobin A1C; Future; Expected date: 06/04/2024  2. Colon cancer screening  Referral given  -     Surgery Referral - In Network  3. Breast cancer screening by mammogram  Script given.   -     [facility] GEORGE 2D+3D SCREENING BILAT (CPT=77067/26999); Future; Expected date: 06/04/2024    5. Hypothyroidism (acquired)  Labs ordered  -     Comp Metabolic Panel (14); Future; Expected date: 06/04/2024  -     TSH W Reflex To Free T4; Future; Expected date: 06/04/2024  -     Hemoglobin A1C; Future; Expected date:  06/04/2024  6. Restless leg syndrome  7. Hyperlipidemia, unspecified hyperlipidemia type  -     Lipid Panel; Future; Expected date: 06/04/2024  -     Hemoglobin A1C; Future; Expected date: 06/04/2024  -     CT CALCIUM SCORING; Future; Expected date: 06/04/2024  8. Encounter for annual routine gynecological examination  Referral given  -     OBG Referral - In Network  9. Palpitations  EKG done nomral sinus with no st changes.   -     Cardio Referral - Internal  -     ELECTROCARDIOGRAM, COMPLETE  10. Class 1 obesity with serious comorbidity and body mass index (BMI) of 33.0 to 33.9 in adult, unspecified obesity type  stable  11. Cervical radiculopathy stable  12. Hypercalcemia stable  13. S/P parathyroidectomy stable  14. Anal cancer (HCC) following with surgeon     The patient indicates understanding of these issues and agrees to the plan.  Reinforced healthy diet, lifestyle, and exercise.      Return in about 6 months (around 12/4/2024) for DR. WATERMAN.     Aimee Waterman MD, 6/4/2024     Supplementary Documentation:   General Health:  In the past six months, have you lost more than 10 pounds without trying?: 2 - No  Has your appetite been poor?: No  Type of Diet: Balanced  How does the patient maintain a good energy level?: Daily Walks  How would you describe your daily physical activity?: Light  How would you describe your current health state?: Good  How do you maintain positive mental well-being?: Social Interaction;Puzzles;Visiting Family  On a scale of 0 to 10, with 0 being no pain and 10 being severe pain, what is your pain level?: 3 - (Mild)  In the past six months, have you experienced urine leakage?: 0-No  At any time do you feel concerned for the safety/well-being of yourself and/or your children, in your home or elsewhere?: No  Have you had any immunizations at another office such as Influenza, Hepatitis B, Tetanus, or Pneumococcal?: No       Monica Holguin's SCREENING SCHEDULE   Tests on this list are  recommended by your physician but may not be covered, or covered at this frequency, by your insurer.   Please check with your insurance carrier before scheduling to verify coverage.   PREVENTATIVE SERVICES FREQUENCY &  COVERAGE DETAILS LAST COMPLETION DATE   Diabetes Screening    Fasting Blood Sugar /  Glucose    One screening every 12 months if never tested or if previously tested but not diagnosed with pre-diabetes   One screening every 6 months if diagnosed with pre-diabetes Lab Results   Component Value Date    GLU 98 04/06/2023        Cardiovascular Disease Screening    Lipid Panel  Cholesterol  Lipoprotein (HDL)  Triglycerides Covered every 5 years for all Medicare beneficiaries without apparent signs or symptoms of cardiovascular disease Lab Results   Component Value Date    CHOLEST 336 (H) 04/06/2023    HDL 42 04/06/2023     (H) 04/06/2023    TRIG 379 (H) 04/06/2023         Electrocardiogram (EKG)   Covered if needed at Welcome to Medicare, and non-screening if indicated for medical reasons -      Ultrasound Screening for Abdominal Aortic Aneurysm (AAA) Covered once in a lifetime for one of the following risk factors    Men who are 65-75 years old and have ever smoked    Anyone with a family history -     Colorectal Cancer Screening  Covered for ages 50-85; only need ONE of the following:    Colonoscopy   Covered every 10 years    Covered every 2 years if patient is at high risk or previous colonoscopy was abnormal 12/05/2018    Health Maintenance   Topic Date Due    Colorectal Cancer Screening  12/05/2021       Flexible Sigmoidoscopy   Covered every 4 years -    Fecal Occult Blood Test Covered annually -   Bone Density Screening    Bone density screening    Covered every 2 years after age 65 if diagnosed with risk of osteoporosis or estrogen deficiency.    Covered yearly for long-term glucocorticoid medication use (Steroids) Last Dexa Scan:    XR DEXA BONE DENSITOMETRY (CPT=77080) 08/05/2022      No  recommendations at this time   Pap and Pelvic    Pap   Covered every 2 years for women at normal risk; Annually if at high risk 03/02/2021  Health Maintenance   Topic Date Due    Pap Smear  03/02/2024       Chlamydia Annually if high risk -  No recommendations at this time   Screening Mammogram    Mammogram     Recommend annually for all female patients aged 40 and older    One baseline mammogram covered for patients aged 35-39 -    Health Maintenance   Topic Date Due    Mammogram  02/08/2022       Immunizations    Influenza Covered once per flu season  Please get every year -  No recommendations at this time    Pneumococcal Each vaccine ([id number] & [id number]) covered once after 65 Prevnar 13: -    [id number]: -     No recommendations at this time    Hepatitis B One screening covered for patients with certain risk factors   -  No recommendations at this time    Tetanus Toxoid Not covered by Medicare Part B unless medically necessary (cut with metal); may be covered with your pharmacy prescription benefits -    Tetanus, Diptheria and Pertusis TD and TDaP Not covered by Medicare Part B -  DTaP,Tdap,and Td Vaccines(1 - Tdap) Never done    Zoster Not covered by Medicare Part B; may be covered with your pharmacy  prescription benefits -  Zoster Vaccines(1 of 2) Never done

## 2024-06-07 DIAGNOSIS — E78.5 HYPERLIPIDEMIA, UNSPECIFIED HYPERLIPIDEMIA TYPE: Primary | ICD-10-CM

## 2024-06-24 LAB
ATRIAL RATE: 68 BPM
P AXIS: 59 DEGREES
P-R INTERVAL: 126 MS
Q-T INTERVAL: 420 MS
QRS DURATION: 96 MS
QTC CALCULATION (BEZET): 446 MS
R AXIS: 14 DEGREES
T AXIS: 10 DEGREES
VENTRICULAR RATE: 68 BPM

## 2024-07-08 ENCOUNTER — TELEPHONE (OUTPATIENT)
Dept: FAMILY MEDICINE CLINIC | Facility: CLINIC | Age: 64
End: 2024-07-08

## 2024-07-20 DIAGNOSIS — E03.9 HYPOTHYROIDISM (ACQUIRED): ICD-10-CM

## 2024-07-20 RX ORDER — LEVOTHYROXINE SODIUM 0.1 MG/1
100 TABLET ORAL
Qty: 90 TABLET | Refills: 0 | Status: SHIPPED | OUTPATIENT
Start: 2024-07-20

## 2024-07-20 NOTE — TELEPHONE ENCOUNTER
LOV: 6/4/24 for physical    levothyroxine 100 MCG Oral Tab 90 tablet 0 4/25/2024 --   Sig:   Take 1 tablet (100 mcg total) by mouth before breakfast.         Thyroid Medication Protocol [id number]/20/2024 11:11 AM   Protocol Details TSH in past 12 months    Last TSH value is normal    In person appointment or virtual visit in the past 12 mos or appointment in next 3 mos     No future appointments.

## 2024-08-23 ENCOUNTER — APPOINTMENT (OUTPATIENT)
Dept: CT IMAGING | Facility: HOSPITAL | Age: 64
End: 2024-08-23
Attending: EMERGENCY MEDICINE
Payer: MEDICARE

## 2024-08-23 ENCOUNTER — HOSPITAL ENCOUNTER (EMERGENCY)
Facility: HOSPITAL | Age: 64
Discharge: HOME | End: 2024-08-23
Attending: EMERGENCY MEDICINE
Payer: MEDICARE

## 2024-08-23 ENCOUNTER — APPOINTMENT (OUTPATIENT)
Dept: ULTRASOUND IMAGING | Facility: HOSPITAL | Age: 64
End: 2024-08-23
Attending: EMERGENCY MEDICINE
Payer: MEDICARE

## 2024-08-23 ENCOUNTER — TELEPHONE (OUTPATIENT)
Dept: FAMILY MEDICINE CLINIC | Facility: CLINIC | Age: 64
End: 2024-08-23

## 2024-08-23 VITALS
BODY MASS INDEX: 34.55 KG/M2 | TEMPERATURE: 97 F | DIASTOLIC BLOOD PRESSURE: 90 MMHG | SYSTOLIC BLOOD PRESSURE: 150 MMHG | WEIGHT: 195 LBS | HEIGHT: 63 IN | RESPIRATION RATE: 14 BRPM | OXYGEN SATURATION: 100 % | HEART RATE: 69 BPM

## 2024-08-23 DIAGNOSIS — N85.8 UTERINE MASS: ICD-10-CM

## 2024-08-23 DIAGNOSIS — R10.9 ABDOMINAL PAIN OF UNKNOWN ETIOLOGY: ICD-10-CM

## 2024-08-23 DIAGNOSIS — R79.89 ELEVATED LFTS: Primary | ICD-10-CM

## 2024-08-23 DIAGNOSIS — Z85.9 HISTORY OF CANCER: ICD-10-CM

## 2024-08-23 LAB
ALBUMIN SERPL-MCNC: 4.8 G/DL (ref 3.2–4.8)
ALBUMIN/GLOB SERPL: 1.8 {RATIO} (ref 1–2)
ALP LIVER SERPL-CCNC: 102 U/L
ALT SERPL-CCNC: 406 U/L
ANION GAP SERPL CALC-SCNC: 2 MMOL/L (ref 0–18)
APTT PPP: 25.3 SECONDS (ref 23–36)
AST SERPL-CCNC: 477 U/L (ref ?–34)
BASOPHILS # BLD AUTO: 0.02 X10(3) UL (ref 0–0.2)
BASOPHILS NFR BLD AUTO: 0.6 %
BILIRUB SERPL-MCNC: 1 MG/DL (ref 0.2–1.1)
BILIRUB UR QL STRIP.AUTO: NEGATIVE
BUN BLD-MCNC: 16 MG/DL (ref 9–23)
CALCIUM BLD-MCNC: 10.6 MG/DL (ref 8.7–10.4)
CHLORIDE SERPL-SCNC: 108 MMOL/L (ref 98–112)
CLARITY UR REFRACT.AUTO: CLEAR
CO2 SERPL-SCNC: 28 MMOL/L (ref 21–32)
CREAT BLD-MCNC: 0.74 MG/DL
EGFRCR SERPLBLD CKD-EPI 2021: 90 ML/MIN/1.73M2 (ref 60–?)
EOSINOPHIL # BLD AUTO: 0.09 X10(3) UL (ref 0–0.7)
EOSINOPHIL NFR BLD AUTO: 2.8 %
ERYTHROCYTE [DISTWIDTH] IN BLOOD BY AUTOMATED COUNT: 13.6 %
GLOBULIN PLAS-MCNC: 2.6 G/DL (ref 2–3.5)
GLUCOSE BLD-MCNC: 96 MG/DL (ref 70–99)
GLUCOSE UR STRIP.AUTO-MCNC: NORMAL MG/DL
HAV IGM SER QL: NONREACTIVE
HBV CORE IGM SER QL: NONREACTIVE
HBV SURFACE AG SERPL QL IA: NONREACTIVE
HCT VFR BLD AUTO: 41.4 %
HCV AB SERPL QL IA: NONREACTIVE
HGB BLD-MCNC: 13.7 G/DL
IMM GRANULOCYTES # BLD AUTO: 0.01 X10(3) UL (ref 0–1)
IMM GRANULOCYTES NFR BLD: 0.3 %
INR BLD: 0.91 (ref 0.8–1.2)
KETONES UR STRIP.AUTO-MCNC: NEGATIVE MG/DL
LEUKOCYTE ESTERASE UR QL STRIP.AUTO: 250
LIPASE SERPL-CCNC: 54 U/L (ref 12–53)
LYMPHOCYTES # BLD AUTO: 0.96 X10(3) UL (ref 1–4)
LYMPHOCYTES NFR BLD AUTO: 29.6 %
MCH RBC QN AUTO: 28.4 PG (ref 26–34)
MCHC RBC AUTO-ENTMCNC: 33.1 G/DL (ref 31–37)
MCV RBC AUTO: 85.9 FL
MONOCYTES # BLD AUTO: 0.31 X10(3) UL (ref 0.1–1)
MONOCYTES NFR BLD AUTO: 9.6 %
NEUTROPHILS # BLD AUTO: 1.85 X10 (3) UL (ref 1.5–7.7)
NEUTROPHILS # BLD AUTO: 1.85 X10(3) UL (ref 1.5–7.7)
NEUTROPHILS NFR BLD AUTO: 57.1 %
NITRITE UR QL STRIP.AUTO: NEGATIVE
OSMOLALITY SERPL CALC.SUM OF ELEC: 287 MOSM/KG (ref 275–295)
PH UR STRIP.AUTO: 6 [PH] (ref 5–8)
PLATELET # BLD AUTO: 232 10(3)UL (ref 150–450)
POTASSIUM SERPL-SCNC: 4.3 MMOL/L (ref 3.5–5.1)
PROT SERPL-MCNC: 7.4 G/DL (ref 5.7–8.2)
PROT UR STRIP.AUTO-MCNC: NEGATIVE MG/DL
PROTHROMBIN TIME: 12.3 SECONDS (ref 11.6–14.8)
RBC # BLD AUTO: 4.82 X10(6)UL
RBC UR QL AUTO: NEGATIVE
SODIUM SERPL-SCNC: 138 MMOL/L (ref 136–145)
SP GR UR STRIP.AUTO: 1.01 (ref 1–1.03)
UROBILINOGEN UR STRIP.AUTO-MCNC: NORMAL MG/DL
WBC # BLD AUTO: 3.2 X10(3) UL (ref 4–11)

## 2024-08-23 PROCEDURE — 74177 CT ABD & PELVIS W/CONTRAST: CPT | Performed by: EMERGENCY MEDICINE

## 2024-08-23 PROCEDURE — 96361 HYDRATE IV INFUSION ADD-ON: CPT

## 2024-08-23 PROCEDURE — 83690 ASSAY OF LIPASE: CPT

## 2024-08-23 PROCEDURE — 87086 URINE CULTURE/COLONY COUNT: CPT | Performed by: EMERGENCY MEDICINE

## 2024-08-23 PROCEDURE — 80053 COMPREHEN METABOLIC PANEL: CPT

## 2024-08-23 PROCEDURE — 85025 COMPLETE CBC W/AUTO DIFF WBC: CPT | Performed by: EMERGENCY MEDICINE

## 2024-08-23 PROCEDURE — 99285 EMERGENCY DEPT VISIT HI MDM: CPT

## 2024-08-23 PROCEDURE — 83690 ASSAY OF LIPASE: CPT | Performed by: EMERGENCY MEDICINE

## 2024-08-23 PROCEDURE — 80074 ACUTE HEPATITIS PANEL: CPT | Performed by: EMERGENCY MEDICINE

## 2024-08-23 PROCEDURE — S0119 ONDANSETRON 4 MG: HCPCS | Performed by: EMERGENCY MEDICINE

## 2024-08-23 PROCEDURE — 96374 THER/PROPH/DIAG INJ IV PUSH: CPT

## 2024-08-23 PROCEDURE — 80053 COMPREHEN METABOLIC PANEL: CPT | Performed by: EMERGENCY MEDICINE

## 2024-08-23 PROCEDURE — 85025 COMPLETE CBC W/AUTO DIFF WBC: CPT

## 2024-08-23 PROCEDURE — 85610 PROTHROMBIN TIME: CPT | Performed by: EMERGENCY MEDICINE

## 2024-08-23 PROCEDURE — 76700 US EXAM ABDOM COMPLETE: CPT | Performed by: EMERGENCY MEDICINE

## 2024-08-23 PROCEDURE — 85730 THROMBOPLASTIN TIME PARTIAL: CPT | Performed by: EMERGENCY MEDICINE

## 2024-08-23 PROCEDURE — 81001 URINALYSIS AUTO W/SCOPE: CPT | Performed by: EMERGENCY MEDICINE

## 2024-08-23 PROCEDURE — 81001 URINALYSIS AUTO W/SCOPE: CPT

## 2024-08-23 RX ORDER — HYDROCODONE BITARTRATE AND ACETAMINOPHEN 5; 325 MG/1; MG/1
1-2 TABLET ORAL EVERY 6 HOURS PRN
Qty: 10 TABLET | Refills: 0 | Status: SHIPPED | OUTPATIENT
Start: 2024-08-23 | End: 2024-08-28

## 2024-08-23 RX ORDER — ONDANSETRON 4 MG/1
4 TABLET, ORALLY DISINTEGRATING ORAL ONCE
Status: COMPLETED | OUTPATIENT
Start: 2024-08-23 | End: 2024-08-23

## 2024-08-23 NOTE — TELEPHONE ENCOUNTER
Patient called and states she has had 2 gallbladder attacks in 3 days. Per patient, she starts getting bad abdominal cramps, back pain, and pain under right side ribs. Patient states she hasn't been able to sleep and got sick from it. Patient wanting to speak with nurse on what to do. Please advise

## 2024-08-23 NOTE — TELEPHONE ENCOUNTER
Symptoms started 3 days ago  Last night and 20th  Patient states she gets abdominal pain at night  Cramping   Nauseated   Back pain  No fever  No diarrhea  Lasted until 5am  Patient reports being so uncomfortable she almost went to ER  Patient concerned about gallbladder  Patient still has appendix  Due to severity of symptoms that are coming and going patient advised to go to ER for eval  Patient agreeable to plan

## 2024-08-23 NOTE — ED INITIAL ASSESSMENT (HPI)
Pt to ER ambulatory, states RUQ pain with nausea started Tuesday night worsening after eating. Pain currently 4/10 , No BM since Tuesday night, emesisx1 and nausea. PCP instructed to come to ER. Denies fever or chills.

## 2024-08-23 NOTE — ED PROVIDER NOTES
Patient Seen in: [facility] Emergency Department      History     Chief Complaint   Patient presents with    Abdomen/Flank Pain     Stated Complaint: abd pain. told to come to PCP to r/o gallbladder    Subjective:   HPI    -64year-old female with a history of anal cancer back pain expose her to radiation bipolar disorder breast cancer urgency department with right upper quadrant abdominal pain worse after eating told to come to the emergency department for evaluation of gallbladder.    Objective:   Past Medical History:    Anal cancer (HCC)    Anal cancer (HCC)    Anesthesia complication    slow to arouse    Anxiety state    Asthma (HCC)    excerise induced    Back problem    upper back pain from radiation    Bipolar disorder (HCC)    Blood disorder    anemia resolved    Depression    Disorder of thyroid    Exposure to radiation    last dose 2014    Fibromyalgia    History of blood transfusion    Hx of motion sickness    Hypothyroidism (acquired)    Dx in 2017    Migraines    in the past    Neoplasm, breast    Personal history of antineoplastic chemotherapy     last dose 2014, patient also completed radiation therapy     PONV (postoperative nausea and vomiting)    Restless leg syndrome    Restless leg syndrome    Visual impairment    glasses,contacts              Past Surgical History:   Procedure Laterality Date          Colonoscopy N/A 11/10/2017    Procedure: COLONOSCOPY;  Surgeon: Ayan Ayon DO;  Location:  ENDOSCOPY    Colonoscopy N/A 2018    Procedure: COLONOSCOPY, POSSIBLE BIOPSY, POSSIBLE POLYPECTOMY 59103;  Surgeon: Ayan Ayon DO;  Location: [facility]    Estrada biopsy stereo nodule 1 site right (cpt=19081)      benign     Estrada biopsy stereo nodule 2 site bilat (cpt=19081/52648)      benign     Estrada localization wire 1 site left (cpt=19281)      Estrada localization wire 1 site right (cpt=19281)      Other      left knee surgery- allograft    Other      left  ankle fracture     Other  08/2017    Anal exam under anesthesia    Other surgical history  10/2020    Port removal                 Social History     Socioeconomic History    Marital status:    Tobacco Use    Smoking status: Never    Smokeless tobacco: Never    Tobacco comments:     non-smoker   Vaping Use    Vaping status: Never Used   Substance and Sexual Activity    Alcohol use: Yes     Comment: rarely    Drug use: No    Sexual activity: Not Currently   Other Topics Concern    Caffeine Concern Yes     Comment: 1 cup coffee per day    Exercise Yes     Comment: walking    Seat Belt Yes              Review of Systems    Positive for stated Chief Complaint: Abdomen/Flank Pain    Other systems are as noted in HPI.  Constitutional and vital signs reviewed.      All other systems reviewed and negative except as noted above.    Physical Exam     ED Triage Vitals [08/23/24 1236]   BP (!) 173/95   Pulse 76   Resp 18   Temp 96.9 °F (36.1 °C)   Temp src Temporal   SpO2 98 %   O2 Device None (Room air)       Current Vitals:   No data recorded          Physical Exam    Pleasant well-developed well-nourished woman lying on emergency department bed she is in no acute distress she is mildly obese her HEENT exam reveals pupils are equal round reactive to light her extraocular movements are intact her neck is supple her lungs are clear to auscultation her heart has regular rate and rhythm she is tender to palpation over the epigastrium and the right upper quadrant, her further exam is actually fairly benign with no tenderness to palpation in the left lower quadrant her upper and lower extremities are benign with no edema.    ED Course     Labs Reviewed   COMP METABOLIC PANEL (14) - Abnormal; Notable for the following components:       Result Value    Calcium, Total 10.6 (*)      (*)      (*)     All other components within normal limits   CBC WITH DIFFERENTIAL WITH PLATELET - Abnormal; Notable for the following  components:    WBC 3.2 (*)     Lymphocyte Absolute 0.96 (*)     All other components within normal limits   LIPASE - Abnormal; Notable for the following components:    Lipase 54 (*)     All other components within normal limits   URINALYSIS WITH CULTURE REFLEX - Abnormal; Notable for the following components:    Leukocyte Esterase Urine 250 (*)     Bacteria Urine Rare (*)     Squamous Epi. Cells Few (*)     All other components within normal limits   HEPATITIS PANEL, ACUTE (4) - Normal   PROTHROMBIN TIME (PT) - Normal   PTT, ACTIVATED - Normal   RAINBOW DRAW LAVENDER   RAINBOW DRAW LIGHT GREEN   URINE CULTURE, ROUTINE          ED Course as of 08/26/24 1450  ------------------------------------------------------------  Time: 08/23 1553  Value: CALCIUM(!): 10.6  Comment: (Reviewed)  ------------------------------------------------------------  Time: 08/23 1553  Value: AST (SGOT)(!): 477  Comment: (Reviewed)  ------------------------------------------------------------  Time: 08/23 1553  Value: ALT (SGPT)(!): 406  Comment: History of anal and nd breast cancer  ------------------------------------------------------------  Time: 08/23 1808  Comment: Spoke with dr ronnie vela'manuela discharge today with plans for Gyn/Onc follow-up -   ------------------------------------------------------------  Time: 08/23 1809  Value: Lipase(!): 54  Comment: (Reviewed)  ------------------------------------------------------------  Time: 08/23 1809  Value: CALCIUM(!): 10.6  Comment: (Reviewed)  ------------------------------------------------------------  Time: 08/23 1809  Value: AST (SGOT)(!): 477  Comment: (Reviewed)  ------------------------------------------------------------  Time: 08/23 1809  Value: ALT (SGPT)(!): 406  Comment: (Reviewed)              MDM      Actually really delightful 64-year-old woman presents to the emergency department she has been having right upper quadrant and epigastric abdominal pain after eating she likes  to eat tomato sandwiches in the summertime and she did have some barbecue potato chips after a particularly intense episode of pain on Tuesday.  She does have a history of 10 years ago of rectal and anal cancer for which she has been in remission for an extended period of time.  There is concern that she is having biliary attacks given the history.  Labs were done  Gallbladder ultrasound was done hide unfortunately, the gallbladder itself appears normal the ducts appear normal.  Given that information and her history a CT of the abdomen pelvis was done and unfortunately may not be good news.  Patient does have diffuse lymph nodes and possibly a mass in her pelvis.  She is going to need a PET scan I discussed the case with oncology they have asked me to discharge the patient now and they will call her on Monday for an appointment.    I gave her pain medication, I do not see any signs of choledocholithiasis or ascending cholangitis unfortunately it may be something more insidious I suppose we can also help \"for a significantly atypical viral process                             Medical Decision Making      Disposition and Plan     Clinical Impression:  1. Elevated LFTs    2. History of cancer    3. Abdominal pain of unknown etiology    4. Uterine mass         Disposition:  Discharge  8/23/2024  7:01 pm    Follow-up:  Aimee Chappell MD  [address] [address] [phone]    Call today      Celio Trujillo MD  [address] [facility] [address] [phone]    Call            Medications Prescribed:  Discharge Medication List as of 8/23/2024  7:25 PM        START taking these medications    Details   HYDROcodone-acetaminophen 5-325 MG Oral Tab Take 1-2 tablets by mouth every 6 (six) hours as needed for Pain., Normal, Disp-10 tablet, R-0

## 2024-08-24 NOTE — DISCHARGE INSTRUCTIONS
Please follow-up with Dr. Trujillo this week.  Return to the emergency department symptoms should significantly worsen may use pain medications as needed

## 2024-08-26 ENCOUNTER — TELEPHONE (OUTPATIENT)
Dept: FAMILY MEDICINE CLINIC | Facility: CLINIC | Age: 64
End: 2024-08-26

## 2024-08-26 ENCOUNTER — TELEPHONE (OUTPATIENT)
Dept: HEMATOLOGY/ONCOLOGY | Facility: HOSPITAL | Age: 64
End: 2024-08-26

## 2024-08-26 DIAGNOSIS — R79.89 ELEVATED LFTS: Primary | ICD-10-CM

## 2024-08-26 NOTE — TELEPHONE ENCOUNTER
Patient of Dr. Trujillo. Patient states she got blood draw at ED visit and her enzymes were high like around 400. Patient states at her ED visit on 8/23, a mass was found in her uterus. Patient states she has been feeling nauseous, fatigued, back pain, abdominal soreness and hot since after leaving ED 8/23. Patient wants Dr. Trujillo's input and what her next steps would be. Please call patient at [phone]. Called 8/26/24 [place]

## 2024-08-26 NOTE — TELEPHONE ENCOUNTER
Future Appointments   Date Time Provider Department Center   8/30/2024  9:00 AM Deb Aranda APRN [facility] [place]   9/24/2024  4:15 PM Ayan Ayon DO [id number]     Scheduled with deb on 8/30/24

## 2024-08-26 NOTE — TELEPHONE ENCOUNTER
Patient was having some nausea, abdominal pain and went to ER on 8/23.  Was found to  have elevated LFT's, mass in uterus.   She was told to follow up with Dr Trujillo, get Gyne consult and get Colonoscopy.    She is still having nausea, no answers on what the problem is and asking to see Dr Trujillo asap and or get recommendations for what should cate do.

## 2024-08-28 ENCOUNTER — OFFICE VISIT (OUTPATIENT)
Dept: HEMATOLOGY/ONCOLOGY | Age: 64
End: 2024-08-28
Attending: INTERNAL MEDICINE
Payer: MEDICARE

## 2024-08-28 VITALS
WEIGHT: 198 LBS | DIASTOLIC BLOOD PRESSURE: 85 MMHG | TEMPERATURE: 98 F | RESPIRATION RATE: 18 BRPM | HEART RATE: 69 BPM | BODY MASS INDEX: 35.08 KG/M2 | OXYGEN SATURATION: 98 % | SYSTOLIC BLOOD PRESSURE: 138 MMHG | HEIGHT: 63 IN

## 2024-08-28 DIAGNOSIS — R10.13 EPIGASTRIC PAIN: ICD-10-CM

## 2024-08-28 DIAGNOSIS — R59.0 LYMPHADENOPATHY, RETROPERITONEAL: ICD-10-CM

## 2024-08-28 DIAGNOSIS — Z85.048 HISTORY OF ANAL CANCER: ICD-10-CM

## 2024-08-28 DIAGNOSIS — R19.09 OTHER INTRA-ABDOMINAL AND PELVIC SWELLING, MASS AND LUMP: ICD-10-CM

## 2024-08-28 DIAGNOSIS — R79.89 ELEVATED LFTS: Primary | ICD-10-CM

## 2024-08-28 DIAGNOSIS — N94.89 ENDOMETRIAL MASS: ICD-10-CM

## 2024-08-28 LAB
ALBUMIN SERPL-MCNC: 3.9 G/DL (ref 3.4–5)
ALBUMIN/GLOB SERPL: 1.1 {RATIO} (ref 1–2)
ALP LIVER SERPL-CCNC: 87 U/L
ALT SERPL-CCNC: 116 U/L
ANION GAP SERPL CALC-SCNC: 4 MMOL/L (ref 0–18)
AST SERPL-CCNC: 32 U/L (ref 15–37)
BASOPHILS # BLD AUTO: 0.03 X10(3) UL (ref 0–0.2)
BASOPHILS NFR BLD AUTO: 0.7 %
BILIRUB SERPL-MCNC: 0.6 MG/DL (ref 0.1–2)
BUN BLD-MCNC: 20 MG/DL (ref 9–23)
CALCIUM BLD-MCNC: 10.8 MG/DL (ref 8.5–10.1)
CANCER AG125 SERPL-ACNC: 423 U/ML (ref ?–30.2)
CHLORIDE SERPL-SCNC: 104 MMOL/L (ref 98–112)
CO2 SERPL-SCNC: 30 MMOL/L (ref 21–32)
CREAT BLD-MCNC: 0.9 MG/DL
EGFRCR SERPLBLD CKD-EPI 2021: 71 ML/MIN/1.73M2 (ref 60–?)
EOSINOPHIL # BLD AUTO: 0.14 X10(3) UL (ref 0–0.7)
EOSINOPHIL NFR BLD AUTO: 3.3 %
ERYTHROCYTE [DISTWIDTH] IN BLOOD BY AUTOMATED COUNT: 13.9 %
GLOBULIN PLAS-MCNC: 3.4 G/DL (ref 2.8–4.4)
GLUCOSE BLD-MCNC: 111 MG/DL (ref 70–99)
HCT VFR BLD AUTO: 42.3 %
HGB BLD-MCNC: 13.9 G/DL
IMM GRANULOCYTES # BLD AUTO: 0.02 X10(3) UL (ref 0–1)
IMM GRANULOCYTES NFR BLD: 0.5 %
LIPASE SERPL-CCNC: 36 U/L (ref 12–53)
LYMPHOCYTES # BLD AUTO: 1.31 X10(3) UL (ref 1–4)
LYMPHOCYTES NFR BLD AUTO: 31.1 %
MCH RBC QN AUTO: 28.6 PG (ref 26–34)
MCHC RBC AUTO-ENTMCNC: 32.9 G/DL (ref 31–37)
MCV RBC AUTO: 87 FL
MONOCYTES # BLD AUTO: 0.34 X10(3) UL (ref 0.1–1)
MONOCYTES NFR BLD AUTO: 8.1 %
NEUTROPHILS # BLD AUTO: 2.37 X10 (3) UL (ref 1.5–7.7)
NEUTROPHILS # BLD AUTO: 2.37 X10(3) UL (ref 1.5–7.7)
NEUTROPHILS NFR BLD AUTO: 56.3 %
OSMOLALITY SERPL CALC.SUM OF ELEC: 289 MOSM/KG (ref 275–295)
PLATELET # BLD AUTO: 248 10(3)UL (ref 150–450)
POTASSIUM SERPL-SCNC: 3.9 MMOL/L (ref 3.5–5.1)
PROT SERPL-MCNC: 7.3 G/DL (ref 6.4–8.2)
RBC # BLD AUTO: 4.86 X10(6)UL
SODIUM SERPL-SCNC: 138 MMOL/L (ref 136–145)
WBC # BLD AUTO: 4.2 X10(3) UL (ref 4–11)

## 2024-08-28 PROCEDURE — 99214 OFFICE O/P EST MOD 30 MIN: CPT | Performed by: INTERNAL MEDICINE

## 2024-08-28 NOTE — PROGRESS NOTES
Cancer Center Progress Note  Patient Name: Monica Holguin   YOB: 1960   Medical Record Number: [id number]   [facility]: [id number]   Attending Physician: Celio Trujillo M.D.       Date of Visit: 8/28/2024      Chief Complaint:  Chief Complaint   Patient presents with    Cancer    Follow - Up     Follow up with         Oncologic History:  Monica Holguin is a 64 year old female referred by Dr. 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  The patient was initially diagnosed with anal cancer in 2013 when she complained of rectal bleeding and inability to use a tampon during her menstrual periods.  She was seen by gynecology, who detected a vaginal mass and referred her to GI for colonoscopy and biopsy.  She also had severe Fe deficiency anemia, at the time, with a hgb around 4.  She was treated with chemoRT by Dr. Berumen at [facility] in [place].  She recalls Mitomycin C and 5FU as the chemo agents. She had a complete response.  She transferred her care to [facility], due to an insurance change, and has been following there with q6 month exams and annual CT.  She has been told that there has been no evidence of recurrence.  Her insurance has mandated a change, again, and she has been referred here for ongoing care.  She has also seen Dr. Ayon, Surgery, who has planned an exam under anesthesthesia to document the absence of recurrence.  She complains of intermittent BRBPR, that she attributes to an anal fissure that is aggravated by her intermittent constipation.  She has also been told that her Thyroid is \"off\" and that may be contributing to her constipation.  She reports a history of IBS with constipation prior to her cancer diagnosis.  Her colonoscopy in November, 2016 was reportedly normal.  Repeat C-scope in Nov, 2017 revealed no evidence of disease.        She saw gynecology for an exam on 8/9/17.        History of Present Illness:  Pt is here for an urgent visit.  She was seen in the ER on 24 complaining of abdominal pain. CT revealed a large endometrial mass and prominent but not enlarged retroperitoneal nodes. She was referred back for evaluation.   She complains of epigastric pain that was worse on the day she visited the ER. She states that it flared and then improved. She has back pain, as well.  She reports that she has abd cramping that is similar to past period pain.   She has an appointment with gynecology tomorrow.     Performance Status:  ECOG 1    Past Medical History:  Past Medical History:    Anal cancer (HCC)    Anal cancer (HCC)    Anesthesia complication    slow to arouse    Anxiety state    Asthma (HCC)    excerise induced    Back problem    upper back pain from radiation    Bipolar disorder (HCC)    Blood disorder    anemia resolved    Depression    Disorder of thyroid    Exposure to radiation    last dose 2014    Fibromyalgia    History of blood transfusion    Hx of motion sickness    Hypothyroidism (acquired)    Dx in 2017    Migraines    in the past    Neoplasm, breast    Personal history of antineoplastic chemotherapy     last dose 2014, patient also completed radiation therapy     PONV (postoperative nausea and vomiting)    Restless leg syndrome    Restless leg syndrome    Visual impairment    glasses,contacts       Past Surgical History:  Past Surgical History:   Procedure Laterality Date          Colonoscopy N/A 11/10/2017    Procedure: COLONOSCOPY;  Surgeon: Ayan Ayon DO;  Location:  ENDOSCOPY    Colonoscopy N/A 2018    Procedure: COLONOSCOPY, POSSIBLE BIOPSY, POSSIBLE POLYPECTOMY 39202;  Surgeon: Ayan Ayon DO;  Location: [facility]    Estrada biopsy stereo nodule 1 site right (cpt=19081)      benign     Estrada biopsy stereo nodule 2 site bilat (cpt=19081/99774)      benign     Estrada localization wire 1 site left (cpt=19281)      Estrada localization wire 1 site right (cpt=19281)      Other      left knee  surgery- allograft    Other      left ankle fracture     Other  08/2017    Anal exam under anesthesia    Other surgical history  10/2020    Port removal        Family History:  Family History   Problem Relation Age of Onset    Cancer Father         lung    Other (anuyrsem) Mother     Thyroid disease Sister         Hypothyroidism on [place] thyroid    Cancer Self        Social History:  Social History     Socioeconomic History    Marital status:      Spouse name: Not on file    Number of children: Not on file    Years of education: Not on file    Highest education level: Not on file   Occupational History    Not on file   Tobacco Use    Smoking status: Never    Smokeless tobacco: Never    Tobacco comments:     non-smoker   Vaping Use    Vaping status: Never Used   Substance and Sexual Activity    Alcohol use: Yes     Comment: rarely    Drug use: No    Sexual activity: Not Currently   Other Topics Concern     Service Not Asked    Blood Transfusions Not Asked    Caffeine Concern Yes     Comment: 1 cup coffee per day    Occupational Exposure Not Asked    Hobby Hazards Not Asked    Sleep Concern Not Asked    Stress Concern Not Asked    Weight Concern Not Asked    Special Diet Not Asked    Back Care Not Asked    Exercise Yes     Comment: walking    Bike Helmet Not Asked    Seat Belt Yes    Self-Exams Not Asked   Social History Narrative    Not on file     Social Determinants of Health     Financial Resource Strain: Not on file   Food Insecurity: Not on file   Transportation Needs: Not on file   Physical Activity: Not on file   Stress: Not on file   Social Connections: Not on file   Housing Stability: Not on file       Current Medications:    Current Outpatient Medications:     Mag Oxide-Vit D3-Turmeric (MAGNESIUM-VITAMIN D3-TURMERIC OR), Take 1 capsule by mouth daily., Disp: , Rfl:     HYDROcodone-acetaminophen 5-325 MG Oral Tab, Take 1-2 tablets by mouth every 6 (six) hours as needed for Pain., Disp: 10  tablet, Rfl: 0    levothyroxine 100 MCG Oral Tab, Take 1 tablet (100 mcg total) by mouth before breakfast., Disp: 90 tablet, Rfl: 0    Gabapentin Enacarbil ER (HORIZANT) 300 MG Oral Tab CR, Take 100 mg by mouth daily., Disp: , Rfl:     B Complex Vitamins (VITAMIN-B COMPLEX OR), Take by mouth daily. (Patient not taking: Reported on 8/23/2024), Disp: , Rfl:     omega-3 fatty acids 1000 MG Oral Cap, Take 1,000 mg by mouth 2 (two) times daily., Disp: , Rfl:     Flaxseed, Linseed, (FLAX SEED OIL OR), Take by mouth 2 (two) times a day. (Patient not taking: Reported on 6/4/2024), Disp: , Rfl:     NON FORMULARY, every other day. Apricot seeds 3 per day, Disp: , Rfl:     Allergies:  Allergies   Allergen Reactions    Chlorhexidine HIVES    Citalopram HIVES    Etomidate ANAPHYLAXIS    Gabapentin OTHER (SEE COMMENTS)     swelling    Lexapro [Escitalopram] SWELLING     Legs and ankles    Mold WHEEZING    Quetiapine Fumarate HIVES, ITCHING and INSOMNIA    Dust OTHER (SEE COMMENTS)     Sneezing, sinus infection    Ropinirole OTHER (SEE COMMENTS)     Restless legs    Adhesive Tape RASH     Need to use surgery glue    Promethazine NAUSEA ONLY        Review of Systems:    Constitutional No fevers, chills, night sweats, excessive fatigue or weight loss.   Eyes No significant visual difficulties. No diplopia. No yellowing of the eyes.   Hematologic/Lymphatic No easy bruising or bleeding.  No any tender or palpable lymph nodes.   Respiratory No dyspnea, Pleuritic chest pain, cough or hemoptysis.   Cardiovascular No anginal chest pain, palpitations or orthopnea.   Gastrointestinal No nausea, vomiting, diarrhea, GI bleeding, or constipation.   Genitorurinary  No hematuria, dysuria, or incontinence. No abnormal bleeding.   Integumentary No rashes or yellowing of the skin   Neurologic No headache, blurred vision, and no areas of focal weakness. Normal gait.   Psychiatric No insomnia, depression, phyllis or mood swings.         Vital  Signs:  Height: 160 cm (5' 3\") (08/28 0805)  Weight: 89.8 kg (198 lb) (08/28 0805)  BSA (Calculated - sq m): 1.93 sq meters (08/28 0805)  Pulse: 69 (08/28 0805)  BP: 138/85 (08/28 0805)  Temp: 98 °F (36.7 °C) (08/28 0805)  Do Not Use - Resp Rate: --  SpO2: 98 % (08/28 0805)    Physical Examination:    Laboratory:  Recent Labs     08/23/24  1310 08/28/24 0805   RBC 4.82 4.86   HGB 13.7 13.9   HCT 41.4 42.3   MCV 85.9 87.0   MCH 28.4 28.6   MCHC 33.1 32.9   RDW 13.6 13.9   NEPRELIM 1.85 2.37   WBC 3.2 L 4.2   .0 248.0     Recent Labs     08/28/24 0805    H   BUN 20   CREATSERUM 0.90   CA 10.8 H   ALB 3.9      K 3.9      CO2 30.0   ALKPHO 87   AST 32    H   BILT 0.6   TP 7.3         Radiology:  CT A/P:CONCLUSION:       1. There is a hypodense structure within the anterior uterine fundus/body measuring 8.4 x 8.8 x 12.4 cm.  This may represent an atypical degenerating uterine fibroid or a malignant process.  GYN follow-up is recommended.  Outpatient pelvic MRI with and   without intravenous contrast is recommended for further assessment.      2. There are mildly prominent retroperitoneal lymph nodes.  A reference measurement of a lymph node at the left aspect of the common iliac artery is 14 x 13 mm.  These lymph nodes may be reactive or metastatic.  PET scan is recommended for further   assessment.      3. Cholelithiasis without CT evidence of acute cholecystitis.         Pathology:  Final Diagnosis:   Right inferior parathyroid:  -Hypercellular parathyroid gland, 10 mm, with rim tissue.  -See comment.       Impression and Plan:    Endometrial mass: She is scheduled to see gynecology tomorrow. A review of the scans and comparison to a CT in 2019 reveals that there was a calcified fibroid in that area back then.     Elevated LFT: Repeat today is improved. I suspect that she has had issues with her gallstones. She is scheduled to see Dr. Ayon for colonoscopy in September. She will  discuss her gallbladder with him then.     Retroperitoneal adenopathy. Check PET.     History of Anal Canal cancer:  KASSANDRA.  Continue follow up with surgery. She is overdue for her colonoscopy. She is scheduled to see Dr. Ayon in September.     Planned Follow Up:  4 weeks.        Electronically Signed by:    Celio Trujillo M.D.  [facility]

## 2024-08-28 NOTE — PROGRESS NOTES
Patient is here today for follow up with  for Hx Anal Cancer. Last Visit 9/22/22. Seen in ED on 8/26/24.  Patient denies c/o pain rated a 3 on a scale of 0-10.  Patient stated has appointment with Gyne tomorrow. Medication list and medical history were reviewed and updated.     Education Record    Learner:  Patient      Disease / Diagnosis: Hx Anal Cancer    Barriers / Limitations:  None   Comments:    Method:  Brief focused, Discussion, Printed material and Reinforcement   Comments:    General Topics:  Medication, Pain, Procedure and Plan of care reviewed   Comments:    Outcome:  Shows understanding   Comments:      AVS provided and follow up reviewed.  Patient instructed to call as needed.

## 2024-10-18 ENCOUNTER — SOCIAL WORK SERVICES (OUTPATIENT)
Dept: HEMATOLOGY/ONCOLOGY | Facility: HOSPITAL | Age: 64
End: 2024-10-18

## 2024-10-18 ENCOUNTER — OFFICE VISIT (OUTPATIENT)
Dept: HEMATOLOGY/ONCOLOGY | Facility: HOSPITAL | Age: 64
End: 2024-10-18
Attending: INTERNAL MEDICINE
Payer: MEDICARE

## 2024-10-18 VITALS
DIASTOLIC BLOOD PRESSURE: 97 MMHG | HEIGHT: 62.99 IN | SYSTOLIC BLOOD PRESSURE: 175 MMHG | RESPIRATION RATE: 18 BRPM | HEART RATE: 70 BPM | WEIGHT: 190.63 LBS | BODY MASS INDEX: 33.78 KG/M2 | TEMPERATURE: 98 F | OXYGEN SATURATION: 98 %

## 2024-10-18 DIAGNOSIS — R79.89 ELEVATED LFTS: ICD-10-CM

## 2024-10-18 DIAGNOSIS — C54.1 ENDOMETRIAL CANCER, FIGO STAGE IIIC2 (HCC): Primary | ICD-10-CM

## 2024-10-18 DIAGNOSIS — E03.9 HYPOTHYROIDISM (ACQUIRED): ICD-10-CM

## 2024-10-18 LAB
ALBUMIN SERPL-MCNC: 5 G/DL (ref 3.2–4.8)
ALBUMIN/GLOB SERPL: 1.9 {RATIO} (ref 1–2)
ALP LIVER SERPL-CCNC: 92 U/L
ALT SERPL-CCNC: 26 U/L
ANION GAP SERPL CALC-SCNC: 5 MMOL/L (ref 0–18)
AST SERPL-CCNC: 16 U/L (ref ?–34)
BASOPHILS # BLD AUTO: 0.03 X10(3) UL (ref 0–0.2)
BASOPHILS NFR BLD AUTO: 0.7 %
BILIRUB SERPL-MCNC: 0.6 MG/DL (ref 0.2–1.1)
BUN BLD-MCNC: 15 MG/DL (ref 9–23)
CALCIUM BLD-MCNC: 11.1 MG/DL (ref 8.7–10.4)
CHLORIDE SERPL-SCNC: 109 MMOL/L (ref 98–112)
CO2 SERPL-SCNC: 27 MMOL/L (ref 21–32)
CREAT BLD-MCNC: 0.79 MG/DL
EGFRCR SERPLBLD CKD-EPI 2021: 83 ML/MIN/1.73M2 (ref 60–?)
EOSINOPHIL # BLD AUTO: 0.24 X10(3) UL (ref 0–0.7)
EOSINOPHIL NFR BLD AUTO: 5.5 %
ERYTHROCYTE [DISTWIDTH] IN BLOOD BY AUTOMATED COUNT: 14 %
GLOBULIN PLAS-MCNC: 2.6 G/DL (ref 2–3.5)
GLUCOSE BLD-MCNC: 97 MG/DL (ref 70–99)
HCT VFR BLD AUTO: 40.3 %
HGB BLD-MCNC: 13.7 G/DL
IMM GRANULOCYTES # BLD AUTO: 0.02 X10(3) UL (ref 0–1)
IMM GRANULOCYTES NFR BLD: 0.5 %
LYMPHOCYTES # BLD AUTO: 1.17 X10(3) UL (ref 1–4)
LYMPHOCYTES NFR BLD AUTO: 26.8 %
MCH RBC QN AUTO: 28.5 PG (ref 26–34)
MCHC RBC AUTO-ENTMCNC: 34 G/DL (ref 31–37)
MCV RBC AUTO: 83.8 FL
MONOCYTES # BLD AUTO: 0.38 X10(3) UL (ref 0.1–1)
MONOCYTES NFR BLD AUTO: 8.7 %
NEUTROPHILS # BLD AUTO: 2.53 X10 (3) UL (ref 1.5–7.7)
NEUTROPHILS # BLD AUTO: 2.53 X10(3) UL (ref 1.5–7.7)
NEUTROPHILS NFR BLD AUTO: 57.8 %
OSMOLALITY SERPL CALC.SUM OF ELEC: 293 MOSM/KG (ref 275–295)
PLATELET # BLD AUTO: 248 10(3)UL (ref 150–450)
POTASSIUM SERPL-SCNC: 3.8 MMOL/L (ref 3.5–5.1)
PROT SERPL-MCNC: 7.6 G/DL (ref 5.7–8.2)
RBC # BLD AUTO: 4.81 X10(6)UL
SODIUM SERPL-SCNC: 141 MMOL/L (ref 136–145)
WBC # BLD AUTO: 4.4 X10(3) UL (ref 4–11)

## 2024-10-18 PROCEDURE — 99215 OFFICE O/P EST HI 40 MIN: CPT | Performed by: INTERNAL MEDICINE

## 2024-10-18 PROCEDURE — G2211 COMPLEX E/M VISIT ADD ON: HCPCS | Performed by: INTERNAL MEDICINE

## 2024-10-18 RX ORDER — ACETAMINOPHEN 160 MG
2000 TABLET,DISINTEGRATING ORAL DAILY
COMMUNITY

## 2024-10-18 RX ORDER — MAGNESIUM AMINO ACID CHELATE 100 MG
100 TABLET ORAL DAILY
COMMUNITY

## 2024-10-18 RX ORDER — PSEUDOEPHEDRINE HCL 30 MG
100 TABLET ORAL DAILY
COMMUNITY
Start: 2024-09-26

## 2024-10-18 NOTE — PROGRESS NOTES
Cancer Center Progress Note  Patient Name: Monica Holguin   YOB: 1960   Medical Record Number: [id number]   [facility]: [id number]   Attending Physician: Celio Trujillo M.D.       Date of Visit: 10/18/2024    Chief Complaint:  No chief complaint on file.       Oncologic History:  Monica Holguin is a 64 year old female referred by Dr. 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  The patient was initially diagnosed with anal cancer in 2013 when she complained of rectal bleeding and inability to use a tampon during her menstrual periods.  She was seen by gynecology, who detected a vaginal mass and referred her to GI for colonoscopy and biopsy.  She also had severe Fe deficiency anemia, at the time, with a hgb around 4.  She was treated with chemoRT by Dr. Berumen at [facility] in [place].  She recalls Mitomycin C and 5FU as the chemo agents. She had a complete response.  She transferred her care to [facility], due to an insurance change, and has been following there with q6 month exams and annual CT.  She has been told that there has been no evidence of recurrence.  Her insurance has mandated a change, again, and she has been referred here for ongoing care.  She has also seen Dr. Ayon, Surgery, who has planned an exam under anesthesthesia to document the absence of recurrence.  She complains of intermittent BRBPR, that she attributes to an anal fissure that is aggravated by her intermittent constipation.  She has also been told that her Thyroid is \"off\" and that may be contributing to her constipation.  She reports a history of IBS with constipation prior to her cancer diagnosis.  Her colonoscopy in November, 2016 was reportedly normal.  Repeat C-scope in Nov, 2017 revealed no evidence of disease.        She saw gynecology for an exam on 8/9/17.        She was seen in the ER on 8/23/24 complaining of abdominal pain. CT revealed a large endometrial mass and prominent but not enlarged  retroperitoneal nodes. She was referred back for evaluation. She was referred to gyn onc and saw Dr. Valdivia    History of Present Illness:  Pt is here for follow up. On 24 she underwent an ex-lap, MONSE/BSO, periaortic lymph node dissection at [facility].   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 Tumor deposits were seen in the lymphatics. Tumor cells were positive for p16. The tumor involved the cervix and 3 of 5 periaortic nodes were positive for metastatic carcinoma. MMR was intact.  Stage IIIc2. Dr. Valdivia recommended 6 cycles of adjuvant Carbo/Taxol with a checkpoint inhibitor. This would possibly be followed by periaortic radiation.     She feels well today. She reports that she is healing and has been cleared for adjuvant chemo.    Performance Status:  ECOG 1    Past Medical History:  Past Medical History:    Anal cancer (HCC)    Anal cancer (HCC)    Anesthesia complication    slow to arouse    Anxiety state    Asthma (HCC)    excerise induced    Back problem    upper back pain from radiation    Bipolar disorder (HCC)    Blood disorder    anemia resolved    Depression    Disorder of thyroid    Exposure to radiation    last dose 2014    Fibromyalgia    History of blood transfusion    Hx of motion sickness    Hypothyroidism (acquired)    Dx in 2017    Migraines    in the past    Neoplasm, breast    Personal history of antineoplastic chemotherapy     last dose 2014, patient also completed radiation therapy     PONV (postoperative nausea and vomiting)    Restless leg syndrome    Restless leg syndrome    Visual impairment    glasses,contacts       Past Surgical History:  Past Surgical History:   Procedure Laterality Date          Colonoscopy N/A 11/10/2017    Procedure: COLONOSCOPY;  Surgeon: Ayan Ayon DO;  Location:  ENDOSCOPY    Colonoscopy N/A 2018    Procedure: COLONOSCOPY, POSSIBLE BIOPSY, POSSIBLE POLYPECTOMY 79610;  Surgeon:  Ayan Ayon, DO;  Location: [facility]    Estrada biopsy stereo nodule 1 site right (cpt=19081)      benign     Estrada biopsy stereo nodule 2 site bilat (cpt=19081/57139)      benign     Estrada localization wire 1 site left (cpt=19281)      Estrada localization wire 1 site right (cpt=19281)      Other      left knee surgery- allograft    Other      left ankle fracture     Other  08/2017    Anal exam under anesthesia    Other surgical history  10/2020    Port removal        Family History:  Family History   Problem Relation Age of Onset    Cancer Father         lung    Other (anuyrsem) Mother     Thyroid disease Sister         Hypothyroidism on [place] thyroid    Cancer Self        Social History:  Social History     Socioeconomic History    Marital status:      Spouse name: Not on file    Number of children: Not on file    Years of education: Not on file    Highest education level: Not on file   Occupational History    Not on file   Tobacco Use    Smoking status: Never    Smokeless tobacco: Never    Tobacco comments:     non-smoker   Vaping Use    Vaping status: Never Used   Substance and Sexual Activity    Alcohol use: Yes     Comment: rarely    Drug use: No    Sexual activity: Not Currently   Other Topics Concern     Service Not Asked    Blood Transfusions Not Asked    Caffeine Concern Yes     Comment: 1 cup coffee per day    Occupational Exposure Not Asked    Hobby Hazards Not Asked    Sleep Concern Not Asked    Stress Concern Not Asked    Weight Concern Not Asked    Special Diet Not Asked    Back Care Not Asked    Exercise Yes     Comment: walking    Bike Helmet Not Asked    Seat Belt Yes    Self-Exams Not Asked   Social History Narrative    Not on file     Social Drivers of Health     Financial Resource Strain: Not on file   Food Insecurity: Not on file   Transportation Needs: Not on file   Physical Activity: Not on file   Stress: Not on file   Social Connections: Not on file   Housing Stability: Not on  file       Current Medications:    Current Outpatient Medications:     Mag Oxide-Vit D3-Turmeric (MAGNESIUM-VITAMIN D3-TURMERIC OR), Take 1 capsule by mouth daily., Disp: , Rfl:     levothyroxine 100 MCG Oral Tab, Take 1 tablet (100 mcg total) by mouth before breakfast., Disp: 90 tablet, Rfl: 0    Gabapentin Enacarbil ER (HORIZANT) 300 MG Oral Tab CR, Take 100 mg by mouth daily., Disp: , Rfl:     omega-3 fatty acids 1000 MG Oral Cap, Take 1,000 mg by mouth 2 (two) times daily., Disp: , Rfl:     NON FORMULARY, every other day. Apricot seeds 3 per day, Disp: , Rfl:     Allergies:  Allergies   Allergen Reactions    Chlorhexidine HIVES    Citalopram HIVES    Etomidate ANAPHYLAXIS    Gabapentin OTHER (SEE COMMENTS)     swelling    Lexapro [Escitalopram] SWELLING     Legs and ankles    Mold WHEEZING    Quetiapine Fumarate HIVES, ITCHING and INSOMNIA    Dust OTHER (SEE COMMENTS)     Sneezing, sinus infection    Ropinirole OTHER (SEE COMMENTS)     Restless legs    Adhesive Tape RASH     Need to use surgery glue    Promethazine NAUSEA ONLY        Review of Systems:    Constitutional No fevers, chills, night sweats, excessive fatigue or weight loss.   Eyes No significant visual difficulties. No diplopia. No yellowing of the eyes.   Hematologic/Lymphatic No easy bruising or bleeding.  No any tender or palpable lymph nodes.   Respiratory No dyspnea, Pleuritic chest pain, cough or hemoptysis.   Cardiovascular No anginal chest pain, palpitations or orthopnea.   Gastrointestinal No nausea, vomiting, diarrhea, GI bleeding, or constipation.   Genitorurinary  No hematuria, dysuria, or incontinence. No abnormal bleeding.   Integumentary No rashes or yellowing of the skin   Neurologic No headache, blurred vision, and no areas of focal weakness. Normal gait.   Psychiatric No insomnia, depression, phyllis or mood swings.         Vital Signs:  Height: 160 cm (5' 2.99\") (10/18 1018)  Weight: 86.5 kg (190 lb 9.6 oz) (10/18 1018)  BSA  (Calculated - sq m): 1.89 sq meters (10/18 1018)  Pulse: 70 (10/18 1018)  BP: 175/97 (10/18 1018)  Temp: 98.1 °F (36.7 °C) (10/18 1018)  Do Not Use - Resp Rate: --  SpO2: 98 % (10/18 1018)    Physical Examination:    Laboratory:  Recent Labs     08/23/24  1310 08/28/24  0805   RBC 4.82 4.86   HGB 13.7 13.9   HCT 41.4 42.3   MCV 85.9 87.0   MCH 28.4 28.6   MCHC 33.1 32.9   RDW 13.6 13.9   NEPRELIM 1.85 2.37   WBC 3.2 L 4.2   .0 248.0     Recent Labs     08/28/24  0805    H   BUN 20   CREATSERUM 0.90   CA 10.8 H   ALB 3.9      K 3.9      CO2 30.0   ALKPHO 87   AST 32    H   BILT 0.6   TP 7.3         Radiology:  CT A/P:CONCLUSION:       1. There is a hypodense structure within the anterior uterine fundus/body measuring 8.4 x 8.8 x 12.4 cm.  This may represent an atypical degenerating uterine fibroid or a malignant process.  GYN follow-up is recommended.  Outpatient pelvic MRI with and   without intravenous contrast is recommended for further assessment.      2. There are mildly prominent retroperitoneal lymph nodes.  A reference measurement of a lymph node at the left aspect of the common iliac artery is 14 x 13 mm.  These lymph nodes may be reactive or metastatic.  PET scan is recommended for further   assessment.      3. Cholelithiasis without CT evidence of acute cholecystitis.         Pathology:  Final Diagnosis:   Right inferior parathyroid:  -Hypercellular parathyroid gland, 10 mm, with rim tissue.  -See comment.       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 Adjuvant chemo-immunotherapy has been recommended with Carbo Taxol + A checkpoint inhibitor x6 cycles. Radiation use is limited in her case due to the prior Rt for the anal canal cancer. Once she completes the adjuvant chemo-Immunotherapy, will plan for her to see Radiation to consider RT to the periaortic  nodes.     Elevated LFT: Repeat today is improved. I suspect that she has had issues with her gallstones. She is scheduled to see Dr. Ayon for colonoscopy in September. She will discuss her gallbladder with him then.     Retroperitoneal adenopathy. Check PET.     History of Anal Canal cancer:  KASSANDRA.  Continue follow up with surgery. She is overdue for her colonoscopy. She is scheduled to see Dr. Ayon in September.     Planned Follow Up:  4 weeks.        Electronically Signed by:    Celio Trujillo M.D.  [facility]

## 2024-10-18 NOTE — PROGRESS NOTES
Pt here for follow up and to discuss treatment.       Outpatient Oncology Care Plan  Problem list:  knowledge deficit    Problems related to:    disease/disease progression    Interventions:  provided general teaching    Expected outcomes:  understands plan of care    Progress towards outcome:  making progress    Education Record    Learner:  Patient and Family Member  Barriers / Limitations:  None  Method:  Brief focused  Outcome:  Shows understanding  Comments:

## 2024-10-18 NOTE — PAT NURSING NOTE
PreOp Instructions     You are scheduled for: an Interventional Radiology Procedure     Date of Procedure: 10/22/24. Check in at 1:00 pm.     Diet Instructions: Do not eat or drink anything for 6 hours before the procedure. No food or water after 8:00 am     Medications: Medications you are allowed to take can be taken with a sip of water the morning of your procedure     Medications to Stop: Hold herbal supplements and vitamins as of today     Skin Prep : Shower with antibacterial soap using a clean washcloth, prior to procedure. Once dried off, no lotions/powders/creams/ointments, etc.     Driving After Procedure: Sedation will be given so you WILL NOT be able to drive home. You will need a responsible adult  to drive you home. You can NOT take uber or taxi unless approved by your physician in advance.     Discharge Teaching: Your nurse will give you specific instructions before discharge, Most people can resume normal activities in 2-3 days, Any questions, please call the physician's office

## 2024-10-18 NOTE — PROGRESS NOTES
SW completed a PHQ9 screening (score 0 on #9) with a score 18. Chart reviewed.     SW spoke to pt to provide support and encouragement. Pt shared feelings and thoughts on new diagnosis.at this time. Pt identified in the screening that she does not have thoughts to harm herself or suicidal ideation.    Pt has a hx of psychiatry with Dr. Wong and states she can return to her if needed. SW encouraged pt to contact her and schedule an appt to have it scheduled as she can cancel if needed.  Pt open to this idea.     SW to remain available to assist if needed. SW sent a [employer]DidIt message with [facility] and resource packet.    SW to meet with pt at start of treatment.

## 2024-10-21 ENCOUNTER — OFFICE VISIT (OUTPATIENT)
Dept: HEMATOLOGY/ONCOLOGY | Age: 64
End: 2024-10-21
Attending: INTERNAL MEDICINE
Payer: MEDICARE

## 2024-10-21 DIAGNOSIS — C54.1 ENDOMETRIAL CANCER, FIGO STAGE IIIC2 (HCC): Primary | ICD-10-CM

## 2024-10-21 DIAGNOSIS — Z71.9 ENCOUNTER FOR HEALTH EDUCATION: ICD-10-CM

## 2024-10-21 PROBLEM — E21.0 PRIMARY HYPERPARATHYROIDISM (HCC): Status: ACTIVE | Noted: 2022-07-13

## 2024-10-21 PROCEDURE — G2212 PROLONG OUTPT/OFFICE VIS: HCPCS | Performed by: NURSE PRACTITIONER

## 2024-10-21 PROCEDURE — 99215 OFFICE O/P EST HI 40 MIN: CPT | Performed by: NURSE PRACTITIONER

## 2024-10-21 RX ORDER — LEVOTHYROXINE SODIUM 100 UG/1
100 TABLET ORAL
Qty: 90 TABLET | Refills: 0 | Status: SHIPPED | OUTPATIENT
Start: 2024-10-21

## 2024-10-21 NOTE — TELEPHONE ENCOUNTER
Thyroid Medication Protocol Uinnwc58/18/2024 06:15 PM   Protocol Details TSH in past 12 months    Last TSH value is normal    In person appointment or virtual visit in the past 12 mos or appointment in next 3 mos     Request for levothyroxine 100 MCG Oral Tab \    LOV 6/4/24 with    Last refill 7/20/24 - 90 tablets 0 refill   Future Appointments   Date Time Provider Department Center   10/21/2024 10:30 AM Hailey Fierro APRN PF HEM ONC [place]   10/22/2024  9:30 AM EH IVS IR EH IVS [facility]   10/24/2024 10:00 AM PF TX RN5 PF CHEMO I [place]   Labs 6/4/24 TSH 0.469

## 2024-10-21 NOTE — PROGRESS NOTES
IV Chemotherapy and Immunotherapy Education    Learner:  Patient, Spouse, and Family Member (sister participating via phone)    Barriers / Limitations:  None    Chemotherapy education goals:  Learn the drug names  Administration schedule  Routes of administration  Treatment setting    Drug names:  carboplatin + paclitaxel + pembrolizumab      Chemotherapy action on cancer / normal cells:  Achieved   Immunotherapy action on cancer / normal cells:  Achieved      Treatment Effects on Mucous Membranes: Achieved     Appropriate oral hygiene / signs of stomatitis and thrush  Nausea and vomiting / use of antiemetics  Patient previously received chemoRT for anal cancer and reports significant nausea that with treatment that was difficult to control. Many of the \"typical\" antiemetics were not helpful for her at that time. Unfortunately, she received treatment in 2013 and records are not available via [facility]. She may have records at home and will contact the office to notify us of the medications that were most helpful. Will also intensify pre-treatment antiemetics to help minimize the need for home antiemetic use.   Potential for immune related colitis (Diarrhea / Mucus/ blood)  Constipation / dietary changes   Notify MD/RN of above symptoms if they persist or do not improve with supportive care >24 hrs      Treatment Effects on Nutritional Status: Achieved    Changes in taste perception / appetite  Notify MD/RN of weight loss or gain and any appetite changes      Treatment Effects on Hair: Achieved    Possible hair loss / how to obtain a wig      Treatment Effects on the Skin: Achieved    Potential nail changes  Potential for rash / itching  Notify MD/RN of any new rash or itching      Immunotherapy effects on Hormone Glands:  Achieved    Potential for changes in function of Thyroid, Pituitary, Adrenal, or pancreas  Notify MD or RN of rapid heart rate, weight loss or gain, increased sweating, feeling more hungry or  thirsty, urinating more frequently, hair loss, feeling cold, constipation, deeper voice, muscle aches, dizziness or fainting, headache       Treatment Effects on Bone Marrow: Achieved    Function of white blood cells / signs of infection  Function of red blood cells / signs of anemia  Function of platelets / signs of bleeding  Notify MD/RN of any chills or fever 100.5 and above  Notify MD/RN of any bleeding      Treatment Effects on the Musculoskeletal System: Achieved    Potential for bone pain and body aches from chemo, worsened if GCSF is added  Use of OTC antihistamine starting the day before chemo to help minimize pain  Use of OTC analgesics for pain control  Notify MD/RN if pain is not adequately controlled.       Immunotherapy Effects on the Liver: Achieved  Potential for hepatitis  Notify MD/RN of any yellowing of skin or eyes, abdominal pain on right, dark urine, excessive bruising or bleeding        Treatment Effects on Neurological System: Achieved    Potential numbness / tingling in hands or feet  Notify MD/RN of any numbness / tingling at next visit      Immunotherapy effects on the Lung: Achieved  Potential for pneumonitis  Notify MD/RN of any new cough or shortness of breath       Treatment Effects on the Bladder and Kidneys: Achieved    Function of the kidneys   Potential for nephritis  Suggested fluid intake  Notify MD/RN if blood appears in urine or if you have a decreased urine output      Infusion Reactions:  Achieved    Notify RN immediately of any chills, shaking, shortness of breath or wheezing, itching or rash, flushing, dizziness, fever, feeling like passing out        Teaching Materials Provided:     Chemotherapy and immunotherapy information sheets from [employer] provided  Dietician information sheet  When to contact the Treatment Team Information Sheet  Side Effect Management Information Sheet  [facility] Services Sheet  National Resources Information sheet    Patient and care  partner were given ample opportunity to ask questions. All questions and concerns addressed. We discussed self care techniques, symptom management, fluid, diet, and activities.     Chemotherapy Consent Form signed by the patient.    I spent a total of 75 minutes with the patient, 100 % of that time was spent counseling patient regarding the above documented side effects and management, when to call provider and contact information.     Encounter Times  PreCharting: 3 minutes    Reviewing/Obtaining:   minutes      Medical Exam:   minutes    Plan:   minutes      Notes: 5 minutes    Counseling/Education: 75 minutes      Referring/Communicating:   minutes    Ind Interpretation:   minutes      Care Coordination: 10 minutes       My total time spent caring for the patient on the day of the encounter: 93 minutes.     Electronically signed:    Hailey Fierro DNP, NP-C  Nurse Practitioner  [facility]

## 2024-10-22 ENCOUNTER — HOSPITAL ENCOUNTER (OUTPATIENT)
Dept: INTERVENTIONAL RADIOLOGY/VASCULAR | Facility: HOSPITAL | Age: 64
Discharge: HOME | End: 2024-10-22
Attending: INTERNAL MEDICINE | Admitting: INTERNAL MEDICINE
Payer: MEDICARE

## 2024-10-22 VITALS
OXYGEN SATURATION: 95 % | DIASTOLIC BLOOD PRESSURE: 80 MMHG | TEMPERATURE: 96 F | SYSTOLIC BLOOD PRESSURE: 125 MMHG | HEART RATE: 75 BPM | RESPIRATION RATE: 18 BRPM

## 2024-10-22 DIAGNOSIS — C54.1 ENDOMETRIAL CANCER, FIGO STAGE IIIC2 (HCC): ICD-10-CM

## 2024-10-22 LAB — INR: 1 (ref 0.8–1.3)

## 2024-10-22 PROCEDURE — 77001 FLUOROGUIDE FOR VEIN DEVICE: CPT | Performed by: RADIOLOGY

## 2024-10-22 PROCEDURE — 99152 MOD SED SAME PHYS/QHP 5/>YRS: CPT | Performed by: RADIOLOGY

## 2024-10-22 PROCEDURE — 36561 INSERT TUNNELED CV CATH: CPT | Performed by: RADIOLOGY

## 2024-10-22 PROCEDURE — 76937 US GUIDE VASCULAR ACCESS: CPT | Performed by: RADIOLOGY

## 2024-10-22 PROCEDURE — 85610 PROTHROMBIN TIME: CPT | Performed by: RADIOLOGY

## 2024-10-22 RX ORDER — LIDOCAINE HYDROCHLORIDE 10 MG/ML
INJECTION, SOLUTION INFILTRATION; PERINEURAL
Status: COMPLETED
Start: 2024-10-22 | End: 2024-10-22

## 2024-10-22 RX ORDER — LIDOCAINE HYDROCHLORIDE AND EPINEPHRINE BITARTRATE 20; .01 MG/ML; MG/ML
INJECTION, SOLUTION SUBCUTANEOUS
Status: COMPLETED
Start: 2024-10-22 | End: 2024-10-22

## 2024-10-22 RX ORDER — MIDAZOLAM HYDROCHLORIDE 1 MG/ML
INJECTION INTRAMUSCULAR; INTRAVENOUS
Status: COMPLETED
Start: 2024-10-22 | End: 2024-10-22

## 2024-10-22 RX ORDER — SODIUM CHLORIDE 9 MG/ML
INJECTION, SOLUTION INTRAVENOUS CONTINUOUS
Status: DISCONTINUED | OUTPATIENT
Start: 2024-10-22 | End: 2024-10-22

## 2024-10-22 RX ORDER — HEPARIN SODIUM 5000 [USP'U]/ML
INJECTION, SOLUTION INTRAVENOUS; SUBCUTANEOUS
Status: COMPLETED
Start: 2024-10-22 | End: 2024-10-22

## 2024-10-22 RX ORDER — CEFAZOLIN SODIUM 1 G/3ML
INJECTION, POWDER, FOR SOLUTION INTRAMUSCULAR; INTRAVENOUS
Status: COMPLETED
Start: 2024-10-22 | End: 2024-10-22

## 2024-10-22 NOTE — PROCEDURES
[facility]   part of [facility]  Procedure Note    Monica Holguin Patient Status:  Outpatient    1960 MRN [id number]   Location [facility] INTERVENTIONAL SUITES Attending Celio Trujillo MD    Day # 0 PCP Aimee Chappell MD     Procedure: Port placement    Pre-Procedure Diagnosis:  Anal cancer    Post-Procedure Diagnosis: Same    Anesthesia:  Sedation    Findings:  patent right internal jugular vein    Specimens: None    Blood Loss:  < 5 cc    Tourniquet Time: None  Complications:  None  Drains:  None    Secondary Diagnosis:  N/A    Florida Jackson MD  10/22/2024

## 2024-10-22 NOTE — DISCHARGE INSTRUCTIONS
~ Follow up with MDs as scheduled.   ~ Rest today and resume regular activity in the am as tolerated.   ~ No driving or drinking alcohol for 24hrs.   ~ Resume diet and medications.   ~ No lifting more than 10 pounds for 48hrs. Avoid lifting heavy objects such as a purse or a backpack from the shoulder of which the port was placed for 48hrs.   ~ Avoid strenuous activity with the arm of which th port was placed for 48hrs.   ~ Keep bandage completely dry and intact. Small white bandage can be removed tomorrow. Leave large brown bandage on and cancer nurses will remove that on and give further bathing instructions.   ~ Notify MD if any signs of infection... Fever, chills, redness, swelling or drainage.   ~ It is normal to have some discomfort at the incisions ite for the first 24-48hrs. You make take over the counter Tylenol if needed.   ~ Keep port card in your wallet.

## 2024-10-22 NOTE — PROGRESS NOTES
Pt s/p port implant in IR today. Pt recovered in holding. Pt  at bedside. HOB elevated. Right neck dressing with padded tegaderm and right chest with mepilex dressing both cdi, soft. Pt ate and drank. Discharge instructions reviewed with pt and copy given. Pt given port card. After recovery pt iv removed and pt dressed. Pt discharged to [facility] via wheelchair by volunteer. Pt left with belongings. Pt  drove pt home.

## 2024-10-22 NOTE — H&P
[facility]   part of [facility]   History & Physical    Monica Holguin Patient Status:  Outpatient    1960 MRN [id number]   Location [facility] INTERVENTIONAL SUITES Attending Celio Trujillo MD   Hosp Day # 0 PCP Aimee Chappell MD     Admitting Diagnosis:   64 year-old female with anal cancer    History of Present Illness:   64 year-old female with anal cancer    History   Past Medical History:  Past Medical History:    Anal cancer (HCC)    Anal cancer (HCC)    Anesthesia complication    slow to arouse    Anxiety state    Asthma (HCC)    excerise induced    Back problem    upper back pain from radiation    Bipolar disorder (HCC)    Blood disorder    anemia resolved    Depression    Disorder of thyroid    Exposure to radiation    last dose 2014    Fibromyalgia    History of blood transfusion    Hx of motion sickness    Hypothyroidism (acquired)    Dx in 2017    Migraines    in the past    Neoplasm, breast    Personal history of antineoplastic chemotherapy     last dose 2014, patient also completed radiation therapy     PONV (postoperative nausea and vomiting)    Restless leg syndrome    Restless leg syndrome    Visual impairment    glasses,contacts       Past Surgical History:  Past Surgical History:   Procedure Laterality Date          Colonoscopy N/A 11/10/2017    Procedure: COLONOSCOPY;  Surgeon: Ayan Ayon DO;  Location:  ENDOSCOPY    Colonoscopy N/A 2018    Procedure: COLONOSCOPY, POSSIBLE BIOPSY, POSSIBLE POLYPECTOMY 54458;  Surgeon: Ayan Ayon DO;  Location: [facility]    Hysterectomy      Estrada biopsy stereo nodule 1 site right (cpt=19081)      benign     Estrada biopsy stereo nodule 2 site bilat (cpt=19081/57238)      benign     Estrada localization wire 1 site left (cpt=19281)      Estrada localization wire 1 site right (cpt=19281)      Other      left knee surgery- allograft    Other      left ankle fracture     Other  2017    Anal exam  under anesthesia    Other surgical history  10/2020    Port removal        Social History:  Social History     Tobacco Use    Smoking status: Never    Smokeless tobacco: Never    Tobacco comments:     non-smoker   Substance Use Topics    Alcohol use: Yes     Comment: rarely        Family History:  Family History   Problem Relation Age of Onset    Cancer Father         lung    Other (anuyrsem) Mother     Thyroid disease Sister         Hypothyroidism on [place] thyroid    Cancer Self        Allergies/Medications:   Allergies:  Allergies[1]    Medications:  No current outpatient medications on file.    Physical Exam & Review of Systems:   Physical Exam:    /89 (BP Location: Right arm)   Pulse 73   Temp (!) 96.3 °F (35.7 °C) (Temporal)   Resp 11   SpO2 96%     General: NAD  Neck: No JVD  Cardiac: Normal  Rate  Lungs: Non-labored respirations  Abdomen: Nondistended  Neuro: Alert and oriented    ASA: III  Mallampati: II    Results:   Labs:  Recent Labs   Lab 10/18/24  1013   RBC 4.81   HGB 13.7   HCT 40.3   MCV 83.8   MCH 28.5   MCHC 34.0   RDW 14.0   NEPRELIM 2.53   WBC 4.4   .0     Recent Labs   Lab 10/22/24  0859   INR 1.0     Recent Labs   Lab 10/18/24  1013   GLU 97   BUN 15   CREATSERUM 0.79   CA 11.1*      K 3.8      CO2 27.0       Assessment/Plan:   Impression: 64 year-old female with anal cancer    I have discussed with the patient and/or legal representative the potential benefits, risks, and side effects of this procedure, the likelihood of the patient achieving goals; and the potential problems that might occur during recuperation.  I discussed reasonable alternatives to the procedure, including risks, benefits and side effects related to the alternatives, and risks related to not receiving this procedure.    Recommendations: Port placement    [place] MD  10/22/2024  9:05 AM           [1]   Allergies  Allergen Reactions    Chlorhexidine HIVES    Citalopram HIVES    Etomidate  ANAPHYLAXIS    Gabapentin OTHER (SEE COMMENTS)     swelling    Lexapro [Escitalopram] SWELLING     Legs and ankles    Mold WHEEZING    Pramipexole SWELLING    Pregabalin SWELLING    Quetiapine Fumarate HIVES, ITCHING and INSOMNIA    Dust OTHER (SEE COMMENTS)     Sneezing, sinus infection    Ropinirole OTHER (SEE COMMENTS)     Restless legs    Adhesive Tape RASH     Need to use surgery glue    Promethazine NAUSEA ONLY

## 2024-10-24 ENCOUNTER — NURSE ONLY (OUTPATIENT)
Dept: HEMATOLOGY/ONCOLOGY | Age: 64
End: 2024-10-24
Attending: INTERNAL MEDICINE
Payer: MEDICARE

## 2024-10-24 VITALS
WEIGHT: 190.5 LBS | HEIGHT: 62.99 IN | TEMPERATURE: 98 F | HEART RATE: 78 BPM | SYSTOLIC BLOOD PRESSURE: 136 MMHG | BODY MASS INDEX: 33.75 KG/M2 | DIASTOLIC BLOOD PRESSURE: 85 MMHG | RESPIRATION RATE: 18 BRPM | OXYGEN SATURATION: 98 %

## 2024-10-24 DIAGNOSIS — C54.1 ENDOMETRIAL CANCER, FIGO STAGE IIIC2 (HCC): Primary | ICD-10-CM

## 2024-10-24 LAB
ALBUMIN SERPL-MCNC: 3.9 G/DL (ref 3.4–5)
ALBUMIN/GLOB SERPL: 1.1 {RATIO} (ref 1–2)
ALP LIVER SERPL-CCNC: 87 U/L
ALT SERPL-CCNC: 30 U/L
ANION GAP SERPL CALC-SCNC: 4 MMOL/L (ref 0–18)
AST SERPL-CCNC: 13 U/L (ref 15–37)
BASOPHILS # BLD AUTO: 0.02 X10(3) UL (ref 0–0.2)
BASOPHILS NFR BLD AUTO: 0.5 %
BILIRUB SERPL-MCNC: 0.6 MG/DL (ref 0.1–2)
BUN BLD-MCNC: 17 MG/DL (ref 9–23)
CALCIUM BLD-MCNC: 10.8 MG/DL (ref 8.5–10.1)
CHLORIDE SERPL-SCNC: 106 MMOL/L (ref 98–112)
CO2 SERPL-SCNC: 27 MMOL/L (ref 21–32)
CREAT BLD-MCNC: 0.75 MG/DL
EGFRCR SERPLBLD CKD-EPI 2021: 89 ML/MIN/1.73M2 (ref 60–?)
EOSINOPHIL # BLD AUTO: 0.19 X10(3) UL (ref 0–0.7)
EOSINOPHIL NFR BLD AUTO: 5 %
ERYTHROCYTE [DISTWIDTH] IN BLOOD BY AUTOMATED COUNT: 14.2 %
GLOBULIN PLAS-MCNC: 3.5 G/DL (ref 2.8–4.4)
GLUCOSE BLD-MCNC: 85 MG/DL (ref 70–99)
HCT VFR BLD AUTO: 39.8 %
HGB BLD-MCNC: 13.6 G/DL
IMM GRANULOCYTES # BLD AUTO: 0.01 X10(3) UL (ref 0–1)
IMM GRANULOCYTES NFR BLD: 0.3 %
LYMPHOCYTES # BLD AUTO: 1.03 X10(3) UL (ref 1–4)
LYMPHOCYTES NFR BLD AUTO: 27.1 %
MCH RBC QN AUTO: 29.2 PG (ref 26–34)
MCHC RBC AUTO-ENTMCNC: 34.2 G/DL (ref 31–37)
MCV RBC AUTO: 85.4 FL
MONOCYTES # BLD AUTO: 0.32 X10(3) UL (ref 0.1–1)
MONOCYTES NFR BLD AUTO: 8.4 %
NEUTROPHILS # BLD AUTO: 2.23 X10 (3) UL (ref 1.5–7.7)
NEUTROPHILS # BLD AUTO: 2.23 X10(3) UL (ref 1.5–7.7)
NEUTROPHILS NFR BLD AUTO: 58.7 %
OSMOLALITY SERPL CALC.SUM OF ELEC: 285 MOSM/KG (ref 275–295)
PLATELET # BLD AUTO: 223 10(3)UL (ref 150–450)
POTASSIUM SERPL-SCNC: 3.8 MMOL/L (ref 3.5–5.1)
PROT SERPL-MCNC: 7.4 G/DL (ref 6.4–8.2)
RBC # BLD AUTO: 4.66 X10(6)UL
SODIUM SERPL-SCNC: 137 MMOL/L (ref 136–145)
T4 FREE SERPL-MCNC: 1.7 NG/DL (ref 0.8–1.7)
TSI SER-ACNC: 0.53 MIU/ML (ref 0.55–4.78)
WBC # BLD AUTO: 3.8 X10(3) UL (ref 4–11)

## 2024-10-24 PROCEDURE — S0028 INJECTION, FAMOTIDINE, 20 MG: HCPCS | Performed by: NURSE PRACTITIONER

## 2024-10-24 PROCEDURE — 96367 TX/PROPH/DG ADDL SEQ IV INF: CPT

## 2024-10-24 PROCEDURE — 84439 ASSAY OF FREE THYROXINE: CPT

## 2024-10-24 PROCEDURE — 96415 CHEMO IV INFUSION ADDL HR: CPT

## 2024-10-24 PROCEDURE — 96417 CHEMO IV INFUS EACH ADDL SEQ: CPT

## 2024-10-24 PROCEDURE — 96413 CHEMO IV INFUSION 1 HR: CPT

## 2024-10-24 PROCEDURE — 85025 COMPLETE CBC W/AUTO DIFF WBC: CPT

## 2024-10-24 PROCEDURE — 80053 COMPREHEN METABOLIC PANEL: CPT

## 2024-10-24 PROCEDURE — 84443 ASSAY THYROID STIM HORMONE: CPT

## 2024-10-24 RX ORDER — DIPHENHYDRAMINE HYDROCHLORIDE 50 MG/ML
50 INJECTION INTRAMUSCULAR; INTRAVENOUS ONCE
Status: CANCELLED | OUTPATIENT
Start: 2024-10-25

## 2024-10-24 RX ORDER — FAMOTIDINE 10 MG/ML
20 INJECTION, SOLUTION INTRAVENOUS ONCE
Status: COMPLETED | OUTPATIENT
Start: 2024-10-24 | End: 2024-10-24

## 2024-10-24 RX ORDER — LIDOCAINE/PRILOCAINE 2.5 %-2.5%
CREAM (GRAM) TOPICAL
Qty: 1 EACH | Refills: 0 | Status: SHIPPED | OUTPATIENT
Start: 2024-10-24

## 2024-10-24 RX ORDER — PALONOSETRON 0.05 MG/ML
0.25 INJECTION, SOLUTION INTRAVENOUS ONCE
Status: COMPLETED | OUTPATIENT
Start: 2024-10-24 | End: 2024-10-24

## 2024-10-24 RX ORDER — PALONOSETRON 0.05 MG/ML
0.25 INJECTION, SOLUTION INTRAVENOUS ONCE
Status: CANCELLED
Start: 2024-10-25 | End: 2024-10-25

## 2024-10-24 RX ORDER — DIPHENHYDRAMINE HYDROCHLORIDE 50 MG/ML
50 INJECTION INTRAMUSCULAR; INTRAVENOUS ONCE
Status: COMPLETED | OUTPATIENT
Start: 2024-10-24 | End: 2024-10-24

## 2024-10-24 RX ORDER — FAMOTIDINE 10 MG/ML
20 INJECTION, SOLUTION INTRAVENOUS ONCE
Status: CANCELLED | OUTPATIENT
Start: 2024-10-25

## 2024-10-24 RX ADMIN — PALONOSETRON 0.25 MG: 0.05 INJECTION, SOLUTION INTRAVENOUS at 11:33:00

## 2024-10-24 RX ADMIN — FAMOTIDINE 20 MG: 10 INJECTION, SOLUTION INTRAVENOUS at 11:35:00

## 2024-10-24 RX ADMIN — DIPHENHYDRAMINE HYDROCHLORIDE 50 MG: 50 INJECTION INTRAMUSCULAR; INTRAVENOUS at 11:39:00

## 2024-10-24 NOTE — PROGRESS NOTES
Pt here for C1D1 Drug(s)taxol/carbo/keytruda.  Arrives Ambulating independently, accompanied by Spouse     Patient was evaluated today by Treatment Nurse.    Oral medications included in this regimen:  no    Patient confirms comprehension of cancer treatment schedule:  yes    Pregnancy screening:  Denies possibility of pregnancy    Modifications in dose or schedule:  No    Medications appearance and physical integrity checked by RN: yes.    Chemotherapy IV pump settings verified by 2 RNs:  Yes.  Frequency of blood return and site check throughout administration: Prior to administration, Prior to each drug, and At completion of therapy     Infusion/treatment outcome:  patient tolerated treatment without incident    Education Record    Learner:  Patient  Barriers / Limitations:  None  Method:  Brief focused and Discussion  Education / instructions given:  schedule, antinausea meds, emla cream, chemo SE  Outcome:  Shows understanding    Discharged Home, Ambulating independently, accompanied by:Spouse    Patient/family verbalized understanding of future appointments: by printed AVS

## 2024-10-24 NOTE — PATIENT INSTRUCTIONS
ANT[place] SCHEDULE:    1. You may take reglan today around 5:00 pm.  2. You may take ondansetron (zofran) tonight prior to bedtime.   3. Alternate between zofran and reglan every 4 hours for the next 2-3 days.   For example: zofran at 6am, reglan 10am, zofran 2pm, reglan at 6pm, etc.  4. Then you may take antinausea medication as needed.      You do NOT need to wake yourself up in the middle of the night to take anti-nausea medications.    Zofran side effects: headaches and constipation.     Emla Cream   Apply generous to port and cover with clear plastic wrap 1 hour prior to appointment.

## 2024-10-25 ENCOUNTER — TELEPHONE (OUTPATIENT)
Dept: HEMATOLOGY/ONCOLOGY | Facility: HOSPITAL | Age: 64
End: 2024-10-25

## 2024-10-25 NOTE — TELEPHONE ENCOUNTER
Toxicities: C1 D1 Paclitaxel/Carboplatin/Pembrolizumab on 10/24/2024    I attempted to reach Monica to see how she is feeling after her first treatment. I left a voice mail message asking her to please return my call at her earliest convenience.

## 2024-10-25 NOTE — TELEPHONE ENCOUNTER
Monica reports she feels \"good.\" No side effects at this time. I encouraged her to please call the office if she is not feeling well or she has any questions or concerns. She agreed and thanked me for checking on her.

## 2024-10-29 ENCOUNTER — HOSPITAL ENCOUNTER (OUTPATIENT)
Dept: NUCLEAR MEDICINE | Facility: HOSPITAL | Age: 64
Discharge: HOME | End: 2024-10-29
Attending: INTERNAL MEDICINE
Payer: MEDICARE

## 2024-10-29 DIAGNOSIS — R59.0 LYMPHADENOPATHY, RETROPERITONEAL: ICD-10-CM

## 2024-10-29 DIAGNOSIS — R19.09 OTHER INTRA-ABDOMINAL AND PELVIC SWELLING, MASS AND LUMP: ICD-10-CM

## 2024-10-29 DIAGNOSIS — Z85.048 HISTORY OF ANAL CANCER: ICD-10-CM

## 2024-10-29 LAB — GLUCOSE BLD-MCNC: 105 MG/DL (ref 70–99)

## 2024-10-29 PROCEDURE — 78815 PET IMAGE W/CT SKULL-THIGH: CPT | Performed by: INTERNAL MEDICINE

## 2024-10-29 PROCEDURE — 82962 GLUCOSE BLOOD TEST: CPT

## 2024-10-31 ENCOUNTER — OFFICE VISIT (OUTPATIENT)
Dept: HEMATOLOGY/ONCOLOGY | Age: 64
End: 2024-10-31
Attending: INTERNAL MEDICINE
Payer: MEDICARE

## 2024-10-31 VITALS
DIASTOLIC BLOOD PRESSURE: 85 MMHG | WEIGHT: 187.31 LBS | SYSTOLIC BLOOD PRESSURE: 142 MMHG | BODY MASS INDEX: 33.19 KG/M2 | HEART RATE: 100 BPM | HEIGHT: 62.99 IN | RESPIRATION RATE: 18 BRPM | TEMPERATURE: 97 F | OXYGEN SATURATION: 97 %

## 2024-10-31 DIAGNOSIS — C54.1 ENDOMETRIAL CANCER, FIGO STAGE IIIC2 (HCC): Primary | ICD-10-CM

## 2024-10-31 DIAGNOSIS — L53.9 REDNESS OF SKIN: ICD-10-CM

## 2024-10-31 PROCEDURE — G2211 COMPLEX E/M VISIT ADD ON: HCPCS | Performed by: NURSE PRACTITIONER

## 2024-10-31 PROCEDURE — 99215 OFFICE O/P EST HI 40 MIN: CPT | Performed by: NURSE PRACTITIONER

## 2024-10-31 NOTE — PROGRESS NOTES
Cancer Center Progress Note    Patient Name: Monica Holguin   YOB: 1960   Medical Record Number: [id number]   [facility]: [id number]   Date of visit: 10/31/2024     Chief Complaint/Reason for Visit:  Chief Complaint   Patient presents with    Follow - Up      History of Present Illness: Monica presents today for follow up of endometrial cancer. She started adjuvant chemotherapy and immunotherapy (carboplatin, paclitaxel and pembrolizumab) last week on 10/24/2024. Her medical oncologist is Dr. Celio Trujillo, additional oncology history below.    Patient reports that she had bone pain for 1-2 days after chemotherapy. She also noticed worsening fatigue for several days. She has noticed several small areas of itchy redness on scattered areas of her chest. She first noticed this the day after right sided port-a-cath was placed on 10/22/2024. She denies other complaints. She is accompanied by her  today.     Oncology History:    Initially diagnosed with anal cancer in 2013 when she complained of rectal bleeding and inability to use a tampon during her menstrual periods.  She was seen by gynecology, who detected a vaginal mass and referred her to GI for colonoscopy and biopsy.  She also had severe Fe deficiency anemia, at the time, with a hgb around 4.  She was treated with chemoRT by Dr. Berumen at [facility] in [place].  She recalls Mitomycin C and 5FU as the chemo agents. She had a complete response.  She transferred her care to [facility], due to an insurance change, and had been following there with q6 month exams and annual CT.  She had been told that there has been no evidence of recurrence.  Her insurance has mandated a change, again, and she has been referred here for ongoing care.  She had also seen Dr. Ayon, Surgery, who has planned an exam under anesthesthesia to document the absence of recurrence. Repeat C-scope in Nov, 2017 revealed no evidence of disease.        She saw gynecology  for an exam on 8/9/17.         She was seen in the ER on 8/23/24 complaining of abdominal pain. CT revealed a large endometrial mass and prominent but not enlarged retroperitoneal nodes. She was referred back for evaluation. She was referred to gyn onc and saw Dr. Valdivia.     On 9/26/24 she underwent an ex-lap, MONSE/BSO, periaortic lymph node dissection at [facility].   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 Tumor deposits were seen in the lymphatics. Tumor cells were positive for p16. The tumor involved the cervix and 3 of 5 periaortic nodes were positive for metastatic carcinoma. MMR was intact.  Stage IIIc2. Dr. Valdivia recommended 6 cycles of adjuvant Carbo/Taxol with a checkpoint inhibitor. This would possibly be followed by periaortic radiation.     Problem List:  Patient Active Problem List   Diagnosis    Hypothyroidism (acquired)    Class 1 obesity with serious comorbidity and body mass index (BMI) of 33.0 to 33.9 in adult    Restless leg syndrome    Cervical radiculopathy    Hypercalcemia    Hyperlipidemia    History of anal cancer    S/P parathyroidectomy    Endometrial cancer, FIGO stage IIIC2 (HCC)    Primary hyperparathyroidism (HCC)        Medical History:  Past Medical History:    Anal cancer (HCC)    Anal cancer (HCC)    Anesthesia complication    slow to arouse    Anxiety state    Asthma (HCC)    excerise induced    Back problem    upper back pain from radiation    Bipolar disorder (HCC)    Blood disorder    anemia resolved    Depression    Disorder of thyroid    Exposure to radiation    last dose February 2014    Fibromyalgia    History of blood transfusion    Hx of motion sickness    Hypothyroidism (acquired)    Dx in 8/2017    Migraines    in the past    Neoplasm, breast    Personal history of antineoplastic chemotherapy     last dose 2/2014, patient also completed radiation therapy 2014    PONV (postoperative nausea and vomiting)    Restless leg syndrome     Restless leg syndrome    Visual impairment    glasses,contacts       Surgical History:  Past Surgical History:   Procedure Laterality Date          Colonoscopy N/A 11/10/2017    Procedure: COLONOSCOPY;  Surgeon: Ayan Ayon DO;  Location:  ENDOSCOPY    Colonoscopy N/A 2018    Procedure: COLONOSCOPY, POSSIBLE BIOPSY, POSSIBLE POLYPECTOMY 32233;  Surgeon: Ayan Ayon DO;  Location: [facility]    Hysterectomy      Estrada biopsy stereo nodule 1 site right (cpt=19081)      benign     Estrada biopsy stereo nodule 2 site bilat (cpt=19081/26919)      benign     Estrada localization wire 1 site left (cpt=19281)      Estrada localization wire 1 site right (cpt=19281)      Other      left knee surgery- allograft    Other      left ankle fracture     Other  2017    Anal exam under anesthesia    Other surgical history  10/2020    Port removal        Allergies:  Allergies[1]    Family History:  Family History   Problem Relation Age of Onset    Cancer Father         lung    Other (anuyrsem) Mother     Thyroid disease Sister         Hypothyroidism on [place] thyroid    Cancer Self        Social History:  Social History     Socioeconomic History    Marital status:      Spouse name: Not on file    Number of children: Not on file    Years of education: Not on file    Highest education level: Not on file   Occupational History    Not on file   Tobacco Use    Smoking status: Never    Smokeless tobacco: Never    Tobacco comments:     non-smoker   Vaping Use    Vaping status: Never Used   Substance and Sexual Activity    Alcohol use: Yes     Comment: rarely    Drug use: No    Sexual activity: Not Currently   Other Topics Concern     Service Not Asked    Blood Transfusions Not Asked    Caffeine Concern Yes     Comment: 1 cup coffee per day    Occupational Exposure Not Asked    Hobby Hazards Not Asked    Sleep Concern Not Asked    Stress Concern Not Asked    Weight Concern Not Asked    Special Diet Not  Asked    Back Care Not Asked    Exercise Yes     Comment: walking    Bike Helmet Not Asked    Seat Belt Yes    Self-Exams Not Asked   Social History Narrative    Not on file     Social Drivers of Health     Financial Resource Strain: Not on file   Food Insecurity: Not on file   Transportation Needs: Not on file   Physical Activity: Not on file   Stress: Not on file   Social Connections: Not on file   Housing Stability: Not on file       Medications:    Current Outpatient Medications:     ondansetron 8 MG Oral Tablet Dispersible, Take 1 tablet (8 mg total) by mouth every 8 (eight) hours as needed for Nausea., Disp: 30 tablet, Rfl: 3    lidocaine-prilocaine 2.5-2.5 % External Cream, Apply to site 1 hour prior to port a cath needle insertion, Disp: 1 each, Rfl: 0    levothyroxine 100 MCG Oral Tab, Take 1 tablet (100 mcg total) by mouth before breakfast., Disp: 90 tablet, Rfl: 0    docusate sodium 100 MG Oral Cap, Take 100 mg by mouth daily., Disp: , Rfl:     Gabapentin Enacarbil ER (HORIZANT) 300 MG Oral Tab CR, Take 100 mg by mouth daily., Disp: , Rfl:     metoclopramide 10 MG Oral Tab, Take 1 tablet (10 mg total) by mouth 4 (four) times daily as needed (nausea). (Patient not taking: Reported on 10/31/2024), Disp: 60 tablet, Rfl: 3    cholecalciferol 50 MCG (2000 UT) Oral Cap, Take 1 capsule (2,000 Units total) by mouth daily. (Patient not taking: Reported on 10/31/2024), Disp: , Rfl:     Chelated Magnesium 100 MG Oral Tab, Take 100 mg by mouth daily. (Patient not taking: Reported on 10/31/2024), Disp: , Rfl:     omega-3 fatty acids 1000 MG Oral Cap, Take 1,000 mg by mouth daily. (Patient not taking: Reported on 10/31/2024), Disp: , Rfl:     NON FORMULARY, every other day. Apricot seeds 3 per day (Patient not taking: Reported on 10/31/2024), Disp: , Rfl:     Review of Systems:  A comprehensive 14 point review of systems was completed.  Pertinent positives and negatives noted in the HPI.    Performance Status: ECOG 1 -  No physically strenuous activity, but ambulatory and able to carry out light or sedentary work (e.g. office work, light house work).    Physical Examination:  General: Patient is alert and oriented x 3, not in acute distress.  Vital Signs: Height: 160 cm (5' 2.99\") (10/31 1120)  Weight: 85 kg (187 lb 4.8 oz) (10/31 1120)  BSA (Calculated - sq m): 1.88 sq meters (10/31 1120)  Pulse: 100 (10/31 1120)  BP: 142/85 (10/31 1120)  Temp: 97.3 °F (36.3 °C) (10/31 1120)  Do Not Use - Resp Rate: --  SpO2: 97 % (10/31 1120)  HEENT: Anicteric, conjunctivae and sclerae clear, no oropharyngeal lesion/thrush, mucous membranes are moist   Chest: Clear to auscultation. Respirations unlabored.   Heart: Regular rate and rhythm.   Abdomen: Soft, non-distended, non-tender with present bowel sounds.  Extremities: No edema.  Neurological: Grossly intact.   Lymphatics: There is no palpable lymphadenopathy throughout in the cervical or supraclavicular regions.   Skin: 6 scattered areas of excoriated flat macular redness  Psych/Depression: mood and affect are appropriate.       Impression/Plan    Endometrial cancer: s/p MONSE/BSO on 9/26/24, pathology was positive for  started adjuvant chemotherapy and immunotherapy (carboplatin, paclitaxel and pembrolizumab) last week on 10/24/2024. She tolerated with expected side effects. Plan for 6 cycles and then refer to radiation oncology for possible RT to periaortic nodes.She is limited for RT due to history of RT for anal cancer.     Redness to skin: pruritic, likely contact dermatitis from adhesive. Start hydrocortisone 1% BID    A total of 30 minutes were spent with the patient of which 90% minutes were spent counseling on plan of care.    Planned Follow Up: 2 weeks follow up with Dr. Trujillo, labs and chemo    Risk Level: HIGH endometrial cancer receiving chemotherapy and immunotherapy requiring close monitoring     The 21st Century Cures Act makes medical notes like these available to patients  in the interest of transparency. Please be advised this is a medical document. Medical documents are intended to carry relevant information, facts as evident, and the clinical opinion of the practitioner. The medical note is intended as peer to peer communication and may appear blunt or direct. It is written in medical language and may contain abbreviations or verbiage that are unfamiliar.     Electronically Signed by:    Valeria Christina, USMAN, APRN, NP-C, AOCNP  Nurse Practitioner  [facility]         [1]   Allergies  Allergen Reactions    Chlorhexidine HIVES    Citalopram HIVES    Etomidate ANAPHYLAXIS    Gabapentin OTHER (SEE COMMENTS)     swelling    Lexapro [Escitalopram] SWELLING     Legs and ankles    Mold WHEEZING    Pramipexole SWELLING    Pregabalin SWELLING    Quetiapine Fumarate HIVES, ITCHING and INSOMNIA    Dust OTHER (SEE COMMENTS)     Sneezing, sinus infection    Ropinirole OTHER (SEE COMMENTS)     Restless legs    Adhesive Tape RASH     Need to use surgery glue    Promethazine NAUSEA ONLY

## 2024-10-31 NOTE — PROGRESS NOTES
Pt here for day 8 APN visit.   PORT area red and other areas on her chest are red.   Pt states on Sunday she had bone and joint pain.  She complains of fatigue.  She has altered taste.   She continues to go to work.      Outpatient Oncology Care Plan  Problem list:  knowledge deficit    Problems related to:    disease/disease progression    Interventions:  provided general teaching    Expected outcomes:  understands plan of care    Progress towards outcome:  making progress    Education Record    Learner:  Patient and Spouse  Barriers / Limitations:  None  Method:  Brief focused  Outcome:  Shows understanding  Comments:

## 2024-11-06 ENCOUNTER — TELEPHONE (OUTPATIENT)
Dept: FAMILY MEDICINE CLINIC | Facility: CLINIC | Age: 64
End: 2024-11-06

## 2024-11-06 NOTE — TELEPHONE ENCOUNTER
Due for mammogram  No future appointment  Last patient outreach mychart message not read  Letter sent

## 2024-11-14 ENCOUNTER — OFFICE VISIT (OUTPATIENT)
Dept: HEMATOLOGY/ONCOLOGY | Age: 64
End: 2024-11-14
Attending: INTERNAL MEDICINE
Payer: MEDICARE

## 2024-11-14 VITALS
RESPIRATION RATE: 16 BRPM | TEMPERATURE: 99 F | HEART RATE: 112 BPM | OXYGEN SATURATION: 96 % | WEIGHT: 187.5 LBS | BODY MASS INDEX: 33.22 KG/M2 | DIASTOLIC BLOOD PRESSURE: 86 MMHG | HEIGHT: 62.99 IN | SYSTOLIC BLOOD PRESSURE: 137 MMHG

## 2024-11-14 DIAGNOSIS — C54.1 ENDOMETRIAL CANCER, FIGO STAGE IIIC2 (HCC): Primary | ICD-10-CM

## 2024-11-14 DIAGNOSIS — Z85.048 HISTORY OF ANAL CANCER: ICD-10-CM

## 2024-11-14 DIAGNOSIS — R59.0 LYMPHADENOPATHY, RETROPERITONEAL: ICD-10-CM

## 2024-11-14 DIAGNOSIS — Z51.11 ENCOUNTER FOR CHEMOTHERAPY MANAGEMENT: ICD-10-CM

## 2024-11-14 LAB
ALBUMIN SERPL-MCNC: 3.8 G/DL (ref 3.4–5)
ALBUMIN/GLOB SERPL: 1.1 {RATIO} (ref 1–2)
ALP LIVER SERPL-CCNC: 91 U/L
ALT SERPL-CCNC: 39 U/L
ANION GAP SERPL CALC-SCNC: 6 MMOL/L (ref 0–18)
AST SERPL-CCNC: 16 U/L (ref 15–37)
BASOPHILS # BLD AUTO: 0.03 X10(3) UL (ref 0–0.2)
BASOPHILS NFR BLD AUTO: 0.5 %
BILIRUB SERPL-MCNC: 0.5 MG/DL (ref 0.1–2)
BUN BLD-MCNC: 17 MG/DL (ref 9–23)
CALCIUM BLD-MCNC: 10.1 MG/DL (ref 8.5–10.1)
CHLORIDE SERPL-SCNC: 108 MMOL/L (ref 98–112)
CO2 SERPL-SCNC: 24 MMOL/L (ref 21–32)
CREAT BLD-MCNC: 0.93 MG/DL
EGFRCR SERPLBLD CKD-EPI 2021: 69 ML/MIN/1.73M2 (ref 60–?)
EOSINOPHIL # BLD AUTO: 0.05 X10(3) UL (ref 0–0.7)
EOSINOPHIL NFR BLD AUTO: 0.9 %
ERYTHROCYTE [DISTWIDTH] IN BLOOD BY AUTOMATED COUNT: 14.9 %
GLOBULIN PLAS-MCNC: 3.4 G/DL (ref 2.8–4.4)
GLUCOSE BLD-MCNC: 158 MG/DL (ref 70–99)
HCT VFR BLD AUTO: 37.7 %
HGB BLD-MCNC: 13 G/DL
IMM GRANULOCYTES # BLD AUTO: 0.02 X10(3) UL (ref 0–1)
IMM GRANULOCYTES NFR BLD: 0.4 %
LYMPHOCYTES # BLD AUTO: 0.86 X10(3) UL (ref 1–4)
LYMPHOCYTES NFR BLD AUTO: 15.2 %
MCH RBC QN AUTO: 29.6 PG (ref 26–34)
MCHC RBC AUTO-ENTMCNC: 34.5 G/DL (ref 31–37)
MCV RBC AUTO: 85.9 FL
MONOCYTES # BLD AUTO: 0.47 X10(3) UL (ref 0.1–1)
MONOCYTES NFR BLD AUTO: 8.3 %
NEUTROPHILS # BLD AUTO: 4.21 X10 (3) UL (ref 1.5–7.7)
NEUTROPHILS # BLD AUTO: 4.21 X10(3) UL (ref 1.5–7.7)
NEUTROPHILS NFR BLD AUTO: 74.7 %
OSMOLALITY SERPL CALC.SUM OF ELEC: 291 MOSM/KG (ref 275–295)
PLATELET # BLD AUTO: 185 10(3)UL (ref 150–450)
POTASSIUM SERPL-SCNC: 3.4 MMOL/L (ref 3.5–5.1)
PROT SERPL-MCNC: 7.2 G/DL (ref 6.4–8.2)
RBC # BLD AUTO: 4.39 X10(6)UL
SODIUM SERPL-SCNC: 138 MMOL/L (ref 136–145)
T4 FREE SERPL-MCNC: 1.5 NG/DL (ref 0.8–1.7)
TSI SER-ACNC: 0.56 UIU/ML (ref 0.55–4.78)
WBC # BLD AUTO: 5.6 X10(3) UL (ref 4–11)

## 2024-11-14 PROCEDURE — 80053 COMPREHEN METABOLIC PANEL: CPT

## 2024-11-14 PROCEDURE — 84443 ASSAY THYROID STIM HORMONE: CPT

## 2024-11-14 PROCEDURE — 96413 CHEMO IV INFUSION 1 HR: CPT

## 2024-11-14 PROCEDURE — G2211 COMPLEX E/M VISIT ADD ON: HCPCS | Performed by: INTERNAL MEDICINE

## 2024-11-14 PROCEDURE — 85025 COMPLETE CBC W/AUTO DIFF WBC: CPT

## 2024-11-14 PROCEDURE — 96415 CHEMO IV INFUSION ADDL HR: CPT

## 2024-11-14 PROCEDURE — 96375 TX/PRO/DX INJ NEW DRUG ADDON: CPT

## 2024-11-14 PROCEDURE — 84439 ASSAY OF FREE THYROXINE: CPT

## 2024-11-14 PROCEDURE — 96417 CHEMO IV INFUS EACH ADDL SEQ: CPT

## 2024-11-14 PROCEDURE — 99215 OFFICE O/P EST HI 40 MIN: CPT | Performed by: INTERNAL MEDICINE

## 2024-11-14 PROCEDURE — S0028 INJECTION, FAMOTIDINE, 20 MG: HCPCS | Performed by: INTERNAL MEDICINE

## 2024-11-14 PROCEDURE — 96367 TX/PROPH/DG ADDL SEQ IV INF: CPT

## 2024-11-14 RX ORDER — DIPHENHYDRAMINE HYDROCHLORIDE 50 MG/ML
50 INJECTION INTRAMUSCULAR; INTRAVENOUS ONCE
Status: CANCELLED | OUTPATIENT
Start: 2024-11-15

## 2024-11-14 RX ORDER — PALONOSETRON 0.05 MG/ML
0.25 INJECTION, SOLUTION INTRAVENOUS ONCE
Status: COMPLETED | OUTPATIENT
Start: 2024-11-14 | End: 2024-11-14

## 2024-11-14 RX ORDER — FAMOTIDINE 10 MG/ML
20 INJECTION, SOLUTION INTRAVENOUS ONCE
Status: COMPLETED | OUTPATIENT
Start: 2024-11-14 | End: 2024-11-14

## 2024-11-14 RX ORDER — DIPHENHYDRAMINE HYDROCHLORIDE 50 MG/ML
50 INJECTION INTRAMUSCULAR; INTRAVENOUS ONCE
Status: COMPLETED | OUTPATIENT
Start: 2024-11-14 | End: 2024-11-14

## 2024-11-14 RX ORDER — FAMOTIDINE 10 MG/ML
20 INJECTION, SOLUTION INTRAVENOUS ONCE
Status: CANCELLED | OUTPATIENT
Start: 2024-11-15

## 2024-11-14 RX ORDER — POLYETHYLENE GLYCOL 3350 17 G/17G
17 POWDER, FOR SOLUTION ORAL DAILY PRN
COMMUNITY

## 2024-11-14 RX ORDER — PALONOSETRON 0.05 MG/ML
0.25 INJECTION, SOLUTION INTRAVENOUS ONCE
Status: CANCELLED
Start: 2024-11-15 | End: 2024-11-15

## 2024-11-14 RX ADMIN — DIPHENHYDRAMINE HYDROCHLORIDE 50 MG: 50 INJECTION INTRAMUSCULAR; INTRAVENOUS at 11:28:00

## 2024-11-14 RX ADMIN — FAMOTIDINE 20 MG: 10 INJECTION, SOLUTION INTRAVENOUS at 11:30:00

## 2024-11-14 RX ADMIN — PALONOSETRON 0.25 MG: 0.05 INJECTION, SOLUTION INTRAVENOUS at 11:32:00

## 2024-11-14 NOTE — PROGRESS NOTES
Cancer Center Progress Note  Patient Name: Monica Holguin   YOB: 1960   Medical Record Number: [id number]   [facility]: [id number]   Attending Physician: Celio Trujillo M.D.       Date of Visit: 11/14/2024      Chief Complaint:  Chief Complaint   Patient presents with    Follow - Up        Oncologic History:  Monica Holguin is a 64 year old female referred by Dr. 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  The patient was initially diagnosed with anal cancer in 2013 when she complained of rectal bleeding and inability to use a tampon during her menstrual periods.  She was seen by gynecology, who detected a vaginal mass and referred her to GI for colonoscopy and biopsy.  She also had severe Fe deficiency anemia, at the time, with a hgb around 4.  She was treated with chemoRT by Dr. Berumen at [facility] in [place].  She recalls Mitomycin C and 5FU as the chemo agents. She had a complete response.  She transferred her care to [facility], due to an insurance change, and has been following there with q6 month exams and annual CT.  She has been told that there has been no evidence of recurrence.  Her insurance has mandated a change, again, and she has been referred here for ongoing care.  She has also seen Dr. Ayon, Surgery, who has planned an exam under anesthesthesia to document the absence of recurrence.  She complains of intermittent BRBPR, that she attributes to an anal fissure that is aggravated by her intermittent constipation.  She has also been told that her Thyroid is \"off\" and that may be contributing to her constipation.  She reports a history of IBS with constipation prior to her cancer diagnosis.  Her colonoscopy in November, 2016 was reportedly normal.  Repeat C-scope in Nov, 2017 revealed no evidence of disease.        She saw gynecology for an exam on 8/9/17.        She was seen in the ER on 8/23/24 complaining of abdominal pain. CT revealed a large endometrial mass  and prominent but not enlarged retroperitoneal nodes. She was referred back for evaluation. She was referred to gyn onc and saw Dr. Valdivia    On 24 she underwent an ex-lap, MONSE/BSO, periaortic lymph node dissection at [facility].   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 Tumor deposits were seen in the lymphatics. Tumor cells were positive for p16. The tumor involved the cervix and 3 of 5 periaortic nodes were positive for metastatic carcinoma. MMR was intact.  Stage IIIc2. Dr. Valdivia recommended 6 cycles of adjuvant Carbo/Taxol with a checkpoint inhibitor. This would possibly be followed by periaortic radiation.     History of Present Illness:  Pt is here for follow up. She tolerated cycle 1 with fatigue and bone/joint pain. Her hair is falling out.     Performance Status:  ECOG 1    Past Medical History:  Past Medical History:    Anal cancer (HCC)    Anal cancer (HCC)    Anesthesia complication    slow to arouse    Anxiety state    Asthma (HCC)    excerise induced    Back problem    upper back pain from radiation    Bipolar disorder (HCC)    Blood disorder    anemia resolved    Depression    Disorder of thyroid    Exposure to radiation    last dose 2014    Fibromyalgia    History of blood transfusion    Hx of motion sickness    Hypothyroidism (acquired)    Dx in 2017    Migraines    in the past    Neoplasm, breast    Personal history of antineoplastic chemotherapy     last dose 2014, patient also completed radiation therapy     PONV (postoperative nausea and vomiting)    Restless leg syndrome    Restless leg syndrome    Visual impairment    glasses,contacts       Past Surgical History:  Past Surgical History:   Procedure Laterality Date          Colonoscopy N/A 11/10/2017    Procedure: COLONOSCOPY;  Surgeon: Ayan Ayon DO;  Location:  ENDOSCOPY    Colonoscopy N/A 2018    Procedure: COLONOSCOPY, POSSIBLE BIOPSY, POSSIBLE  POLYPECTOMY 74304;  Surgeon: Ayan Ayon DO;  Location: [facility]    Hysterectomy      Estrada biopsy stereo nodule 1 site right (cpt=19081)      benign     Estrada biopsy stereo nodule 2 site bilat (cpt=19081/62012)      benign     Estrada localization wire 1 site left (cpt=19281)      Estrada localization wire 1 site right (cpt=19281)      Other      left knee surgery- allograft    Other      left ankle fracture     Other  08/2017    Anal exam under anesthesia    Other surgical history  10/2020    Port removal        Family History:  Family History   Problem Relation Age of Onset    Cancer Father         lung    Other (anuyrsem) Mother     Thyroid disease Sister         Hypothyroidism on [place] thyroid    Cancer Self        Social History:  Social History     Socioeconomic History    Marital status:      Spouse name: Not on file    Number of children: Not on file    Years of education: Not on file    Highest education level: Not on file   Occupational History    Not on file   Tobacco Use    Smoking status: Never    Smokeless tobacco: Never    Tobacco comments:     non-smoker   Vaping Use    Vaping status: Never Used   Substance and Sexual Activity    Alcohol use: Yes     Comment: rarely    Drug use: No    Sexual activity: Not Currently   Other Topics Concern     Service Not Asked    Blood Transfusions Not Asked    Caffeine Concern Yes     Comment: 1 cup coffee per day    Occupational Exposure Not Asked    Hobby Hazards Not Asked    Sleep Concern Not Asked    Stress Concern Not Asked    Weight Concern Not Asked    Special Diet Not Asked    Back Care Not Asked    Exercise Yes     Comment: walking    Bike Helmet Not Asked    Seat Belt Yes    Self-Exams Not Asked   Social History Narrative    Not on file     Social Drivers of Health     Financial Resource Strain: Not on file   Food Insecurity: Not on file   Transportation Needs: Not on file   Physical Activity: Not on file   Stress: Not on file   Social  Connections: Not on file   Housing Stability: Not on file       Current Medications:    Current Outpatient Medications:     metoclopramide 10 MG Oral Tab, Take 1 tablet (10 mg total) by mouth 4 (four) times daily as needed (nausea). (Patient not taking: Reported on 10/31/2024), Disp: 60 tablet, Rfl: 3    ondansetron 8 MG Oral Tablet Dispersible, Take 1 tablet (8 mg total) by mouth every 8 (eight) hours as needed for Nausea., Disp: 30 tablet, Rfl: 3    lidocaine-prilocaine 2.5-2.5 % External Cream, Apply to site 1 hour prior to port a cath needle insertion, Disp: 1 each, Rfl: 0    levothyroxine 100 MCG Oral Tab, Take 1 tablet (100 mcg total) by mouth before breakfast., Disp: 90 tablet, Rfl: 0    cholecalciferol 50 MCG (2000 UT) Oral Cap, Take 1 capsule (2,000 Units total) by mouth daily. (Patient not taking: Reported on 10/31/2024), Disp: , Rfl:     Chelated Magnesium 100 MG Oral Tab, Take 100 mg by mouth daily. (Patient not taking: Reported on 10/31/2024), Disp: , Rfl:     docusate sodium 100 MG Oral Cap, Take 100 mg by mouth daily., Disp: , Rfl:     Gabapentin Enacarbil ER (HORIZANT) 300 MG Oral Tab CR, Take 100 mg by mouth daily., Disp: , Rfl:     omega-3 fatty acids 1000 MG Oral Cap, Take 1,000 mg by mouth daily. (Patient not taking: Reported on 10/31/2024), Disp: , Rfl:     NON FORMULARY, every other day. Apricot seeds 3 per day (Patient not taking: Reported on 10/31/2024), Disp: , Rfl:     Allergies:  Allergies   Allergen Reactions    Chlorhexidine HIVES    Citalopram HIVES    Etomidate ANAPHYLAXIS    Gabapentin OTHER (SEE COMMENTS)     swelling    Lexapro [Escitalopram] SWELLING     Legs and ankles    Mold WHEEZING    Pramipexole SWELLING    Pregabalin SWELLING    Quetiapine Fumarate HIVES, ITCHING and INSOMNIA    Dust OTHER (SEE COMMENTS)     Sneezing, sinus infection    Ropinirole OTHER (SEE COMMENTS)     Restless legs    Adhesive Tape RASH     Need to use surgery glue    Promethazine NAUSEA ONLY         Review of Systems:    Constitutional No fevers, chills, night sweats, excessive fatigue or weight loss.   Eyes No significant visual difficulties. No diplopia. No yellowing of the eyes.   Hematologic/Lymphatic No easy bruising or bleeding.  No any tender or palpable lymph nodes.   Respiratory No dyspnea, Pleuritic chest pain, cough or hemoptysis.   Cardiovascular No anginal chest pain, palpitations or orthopnea.   Gastrointestinal No nausea, vomiting, diarrhea, GI bleeding, or constipation.   Genitorurinary  No hematuria, dysuria, or incontinence. No abnormal bleeding.   Integumentary No rashes or yellowing of the skin   Neurologic No headache, blurred vision, and no areas of focal weakness. Normal gait.   Psychiatric No insomnia, depression, phyllis or mood swings.         Vital Signs:  Height: 160 cm (5' 2.99\") (11/14 0930)  Weight: 85 kg (187 lb 8 oz) (11/14 0930)  BSA (Calculated - sq m): 1.88 sq meters (11/14 0930)  Pulse: 112 (11/14 0930)  BP: 137/86 (11/14 0930)  Temp: 99.4 °F (37.4 °C) (11/14 0930)  Do Not Use - Resp Rate: --  SpO2: 96 % (11/14 0930)    Physical Examination:    Laboratory:  Recent Labs     11/14/24  0920   RBC 4.39   HGB 13.0   HCT 37.7   MCV 85.9   MCH 29.6   MCHC 34.5   RDW 14.9   NEPRELIM 4.21   WBC 5.6   .0     Recent Labs     11/14/24  0920    H   BUN 17   CREATSERUM 0.93   CA 10.1   ALB 3.8      K 3.4 L      CO2 24.0   ALKPHO 91   AST 16   ALT 39   BILT 0.5   TP 7.2       Radiology:  PET 10/29/24:  CONCLUSION:    1. Multifocal retroperitoneal lymphadenopathy with abnormal radiotracer uptake as described above.   2. Bilateral shotty inguinal lymph nodes with abnormal radiotracer uptake involving a 8 mm lymph node in the left inguinal region with a max SUV of 8.93 consistent with lymphadenopathy.  Right inguinal lymph node is equivocal with max SUV of 2.82.   3. Focus of lymphadenopathy within the posterior mediastinum, involving the left paraesophageal region  at the level of the midesophagus with a max SUV of 8.27.  Additionally, at the GE junction there is a softened JOSEFA wall thickening with abnormal   increased radiotracer uptake of 7.08.  The possibility of a distal esophageal neoplasm cannot be excluded.             Pathology:  Final Diagnosis:   Right inferior parathyroid:  -Hypercellular parathyroid gland, 10 mm, with rim tissue.  -See comment.       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 Adjuvant chemo-immunotherapy has been recommended with Carbo Taxol + A checkpoint inhibitor x6 cycles. Radiation use is limited in her case due to the prior Rt for the anal canal cancer. Once she completes the adjuvant chemo-Immunotherapy, will plan for her to see Radiation to consider RT to the periaortic nodes, though I am concerned about the esophageal findings and mediastinal node. Will ask her to see GI. We may be able to have an EGD done between cycles of chemotherapy.     Retroperitoneal adenopathy. Consider RT upon completion of adjuvant chemo.     History of Anal Canal cancer:  KASSANDRA.  Continue follow up with surgery. She is overdue for her colonoscopy. She is scheduled to see Dr. Ayon in September.     Planned Follow Up:  3 weeks.    The patient and I have a longitudinal relationship to address/treat this serious and complex condition      Electronically Signed by:    Celio Trujillo M.D.  [facility]

## 2024-11-14 NOTE — PROGRESS NOTES
Pt here for follow up and treatment.  Pt has sinus pressure and headache.  Port site healing but on top of port continues to be red.

## 2024-11-14 NOTE — PROGRESS NOTES
Pt here for C2 D1 Drug(s): Keytruda/Taxol/Carboplatin.  Arrives Ambulating independently, accompanied by Self     Patient was evaluated today by MD and Treatment Nurse.    Oral medications included in this regimen:  no    Patient confirms comprehension of cancer treatment schedule:  yes    Pregnancy screening:  Denies possibility of pregnancy    Modifications in dose or schedule:  No    Medications appearance and physical integrity checked by RN: yes.    Chemotherapy IV pump settings verified by 2 RNs:  Yes.  Frequency of blood return and site check throughout administration: Prior to administration, Prior to each drug, and At completion of therapy     Infusion/treatment outcome:  patient tolerated treatment without incident    Education Record    Learner:  Patient  Barriers / Limitations:  None  Method:  Discussion  Education / instructions given:  Reviewed plan of care and follow up appts.  Outcome:  Shows understanding    Discharged Home, Ambulating independently, accompanied by:Self    Patient/family verbalized understanding of future appointments: by [employer] messaging

## 2024-11-22 PROBLEM — J44.89 ASTHMA WITH COPD (CHRONIC OBSTRUCTIVE PULMONARY DISEASE) (HCC): Chronic | Status: ACTIVE | Noted: 2024-11-22

## 2024-11-25 PROBLEM — R93.3 ABNORMAL FINDING ON GI TRACT IMAGING: Status: ACTIVE | Noted: 2024-11-25

## 2024-11-27 ENCOUNTER — HOSPITAL ENCOUNTER (OUTPATIENT)
Facility: HOSPITAL | Age: 64
Setting detail: HOSPITAL OUTPATIENT SURGERY
Discharge: HOME | End: 2024-11-27
Attending: INTERNAL MEDICINE | Admitting: INTERNAL MEDICINE
Payer: MEDICARE

## 2024-11-27 ENCOUNTER — ANESTHESIA EVENT (OUTPATIENT)
Dept: ENDOSCOPY | Facility: HOSPITAL | Age: 64
End: 2024-11-27
Payer: MEDICARE

## 2024-11-27 ENCOUNTER — ANESTHESIA (OUTPATIENT)
Dept: ENDOSCOPY | Facility: HOSPITAL | Age: 64
End: 2024-11-27
Payer: MEDICARE

## 2024-11-27 VITALS
OXYGEN SATURATION: 98 % | BODY MASS INDEX: 33.13 KG/M2 | WEIGHT: 187 LBS | SYSTOLIC BLOOD PRESSURE: 122 MMHG | DIASTOLIC BLOOD PRESSURE: 70 MMHG | TEMPERATURE: 98 F | HEART RATE: 73 BPM | HEIGHT: 63 IN | RESPIRATION RATE: 16 BRPM

## 2024-11-27 PROCEDURE — BD47ZZZ ULTRASONOGRAPHY OF GASTROINTESTINAL TRACT: ICD-10-PCS | Performed by: INTERNAL MEDICINE

## 2024-11-27 RX ORDER — ONDANSETRON 2 MG/ML
4 INJECTION INTRAMUSCULAR; INTRAVENOUS ONCE AS NEEDED
Status: DISCONTINUED | OUTPATIENT
Start: 2024-11-27 | End: 2024-11-27

## 2024-11-27 RX ORDER — SODIUM CHLORIDE, SODIUM LACTATE, POTASSIUM CHLORIDE, CALCIUM CHLORIDE 600; 310; 30; 20 MG/100ML; MG/100ML; MG/100ML; MG/100ML
INJECTION, SOLUTION INTRAVENOUS CONTINUOUS
Status: DISCONTINUED | OUTPATIENT
Start: 2024-11-27 | End: 2024-11-27

## 2024-11-27 RX ORDER — ONDANSETRON 2 MG/ML
INJECTION INTRAMUSCULAR; INTRAVENOUS AS NEEDED
Status: DISCONTINUED | OUTPATIENT
Start: 2024-11-27 | End: 2024-11-27 | Stop reason: SURG

## 2024-11-27 RX ORDER — NALOXONE HYDROCHLORIDE 0.4 MG/ML
0.08 INJECTION, SOLUTION INTRAMUSCULAR; INTRAVENOUS; SUBCUTANEOUS ONCE AS NEEDED
Status: DISCONTINUED | OUTPATIENT
Start: 2024-11-27 | End: 2024-11-27

## 2024-11-27 RX ADMIN — SODIUM CHLORIDE, SODIUM LACTATE, POTASSIUM CHLORIDE, CALCIUM CHLORIDE: 600; 310; 30; 20 INJECTION, SOLUTION INTRAVENOUS at 11:06:00

## 2024-11-27 RX ADMIN — ONDANSETRON 4 MG: 2 INJECTION INTRAMUSCULAR; INTRAVENOUS at 11:08:00

## 2024-11-27 NOTE — OPERATIVE REPORT
Monica Holguin Patient Status:  Hospital Outpatient Surgery    1960 MRN [id number]   [facility] ENDOSCOPY PAIN CENTER Attending Fred Pena MD   Date 2024 PCP Aimee Chappell MD     PREOPERATIVE DIAGNOSIS/INDICATION: Abnormal PET: Focus of lymphadenopathy within the posterior mediastinum, involving the left paraesophageal region at the level of the midesophagus with a max SUV of 8.27.  Additionally, at the GE junction there is a softened JOSEFA wall thickening with abnormal increased radiotracer uptake of 7.08.  The possibility of a distal esophageal neoplasm cannot be excluded   POSTOPERTATIVE DIAGNOSIS: Hiatal hernia, Esophagitis, Schatzki's ring  PROCEDURE PERFORMED: EUS/EGD   TIME OUT WAS PERFORMED    SEDATION: MAC sedation provided by General Anesthesia    INFORMED CONSENT: Risks, benefits and alternatives to the procedure were explained to the patient including but not limited to bleeding, infection, perforation, adverse drug reactions, pancreatitis and the need for hospitalization and surgery if this occurs, the patient understands and agrees to procedure.  PROCEDURE DESCRIPTION: The therapeutic side viewing echoendoscope was introduced into the patient’s mouth, hypo pharynx, esophagus, stomach and the first and second portion of the duodenum, straightening of the endoscope was performed to obtain a direct view of the major ampulla. Careful but limited examination of the above described areas was performed on withdrawal of the endoscope. The patient tolerated the procedure well and there were no immediate complications noted during the procedure, the patient was transported to the recovery area in stable condition.  FINDINGS:  ESOPHAGUS: LA class A erosive esophagitis, Schatzki's ring  GEJ: 3 cm hiatal hernia, Hill grade 3  STOMACH: Normal  DUODENUM: Normal  ECHO: Imaging was performed through the esophagus, stomach and duodenum. The subcarinal space and the aortopulmonary  window appear wnl, the celiac axis and the left adrenal gland appear wnl, the visualized portions of the left hepatic lobe showed fatty infiltration, the visualized pancreatic parenchyma appear wnl, the GB showed sludge and gallstones, the confluence of the portal vein, superior mesenteric vein and splenic vein appear wnl  THERAPEUTICS:  None  RECOMMENDATIONS:   Post EUS/FNAprecautions, watch for bleeding, infection, perforation, adverse drug reactions and pancreatitis.  Pantoprazole 40 mg PO q day x 6 weeks  Call status report post procedure.    Fred Pena MD  11/27/2024  11:31 AM

## 2024-11-27 NOTE — ANESTHESIA PREPROCEDURE EVALUATION
PRE-OP EVALUATION    Patient Name: Monica Holguin    Admit Diagnosis: Abnormal finding on GI tract imaging [R93.3]  Abnormal findings on diagnostic imaging of other parts of digestive tract [R93.3]  Endometrial cancer (HCC) [C54.1]    Pre-op Diagnosis: Abnormal finding on GI tract imaging [R93.3]  Abnormal findings on diagnostic imaging of other parts of digestive tract [R93.3]  Endometrial cancer (HCC) [C54.1]    ESOPHAGOGASTRODUODENOSCOPY (EGD), ENDOSCOPIC ULTRASOUND (EUS) UPPER    Anesthesia Procedure: ESOPHAGOGASTRODUODENOSCOPY (EGD), ENDOSCOPIC ULTRASOUND (EUS) UPPER  .    Surgeons and Role:     * Fred Pena MD - Primary    Pre-op vitals reviewed.  Temp: 97.8 °F (36.6 °C)  Pulse: 73  Resp: 16  BP: 119/74  SpO2: 99 %  Body mass index is 33.13 kg/m².    Current medications reviewed.  Hospital Medications:   lactated ringers infusion   Intravenous Continuous       Outpatient Medications:   Prescriptions Prior to Admission[1]    Allergies: Chlorhexidine, Citalopram, Etomidate, Gabapentin, Lexapro [escitalopram], Mold, Pramipexole, Pregabalin, Quetiapine fumarate, Dust, Ropinirole, Adhesive tape, and Promethazine      Anesthesia Evaluation    Patient summary reviewed.    Anesthetic Complications  (+) history of anesthetic complications  History of: PONV       GI/Hepatic/Renal    Negative GI/hepatic/renal ROS.                             Cardiovascular        Exercise tolerance: good     MET: >4    (+) obesity         (-) past MI                              Endo/Other    Negative endo/other ROS.                              Pulmonary      (+) asthma  (+) COPD            (-) sleep apnea       Neuro/Psych      (+) depression    (-) CVA       (+) neuromuscular disease                     Past Surgical History:   Procedure Laterality Date          Colonoscopy N/A 11/10/2017    Procedure: COLONOSCOPY;  Surgeon: Ayan Ayon DO;  Location:  ENDOSCOPY    Colonoscopy N/A 2018    Procedure:  COLONOSCOPY, POSSIBLE BIOPSY, POSSIBLE POLYPECTOMY 87363;  Surgeon: Ayan Ayon DO;  Location: [facility]    Hysterectomy      Estrada biopsy stereo nodule 1 site right (cpt=19081)      benign     Estrada biopsy stereo nodule 2 site bilat (cpt=19081/11166)      benign     Estrada localization wire 1 site left (cpt=19281)      Estrada localization wire 1 site right (cpt=19281)      Other      left knee surgery- allograft    Other      left ankle fracture     Other  08/2017    Anal exam under anesthesia    Other surgical history  10/2020    Port removal     Port, indwelling, imp      Sigmoidoscopy,diagnostic      Tonsillectomy  1963    Total abdom hysterectomy  9/26/24     Social History     Socioeconomic History    Marital status:    Tobacco Use    Smoking status: Never    Smokeless tobacco: Never    Tobacco comments:     non-smoker   Vaping Use    Vaping status: Never Used   Substance and Sexual Activity    Alcohol use: Yes     Comment: rare    Drug use: No    Sexual activity: Not Currently   Other Topics Concern    Caffeine Concern Yes     Comment: 1 cup coffee per day    Exercise Yes     Comment: walking    Seat Belt Yes     History   Drug Use No     Available pre-op labs reviewed.  Lab Results   Component Value Date    WBC 5.6 11/14/2024    RBC 4.39 11/14/2024    HGB 13.0 11/14/2024    HCT 37.7 11/14/2024    MCV 85.9 11/14/2024    MCH 29.6 11/14/2024    MCHC 34.5 11/14/2024    RDW 14.9 11/14/2024    .0 11/14/2024     Lab Results   Component Value Date     11/14/2024    K 3.4 (L) 11/14/2024     11/14/2024    CO2 24.0 11/14/2024    BUN 17 11/14/2024    CREATSERUM 0.93 11/14/2024     (H) 11/14/2024    CA 10.1 11/14/2024            Airway      Mallampati: II  Mouth opening: >3 FB    Neck ROM: full Cardiovascular    Cardiovascular exam normal.         Dental             Pulmonary    Pulmonary exam normal.                 Other findings              ASA: 2   Plan: MAC  NPO status verified and            Plan/risks discussed with: patient                Present on Admission:  **None**             [1]   Medications Prior to Admission   Medication Sig Dispense Refill Last Dose/Taking    metoclopramide 10 MG Oral Tab Take 1 tablet (10 mg total) by mouth 4 (four) times daily as needed (nausea). 60 tablet 3 Past Week    ondansetron 8 MG Oral Tablet Dispersible Take 1 tablet (8 mg total) by mouth every 8 (eight) hours as needed for Nausea. 30 tablet 3 Past Week    lidocaine-prilocaine 2.5-2.5 % External Cream Apply to site 1 hour prior to port a cath needle insertion 1 each 0 Taking    levothyroxine 100 MCG Oral Tab Take 1 tablet (100 mcg total) by mouth before breakfast. 90 tablet 0 11/26/2024    Gabapentin Enacarbil ER (HORIZANT) 300 MG Oral Tab CR Take 100 mg by mouth daily.   11/26/2024

## 2024-11-27 NOTE — DISCHARGE INSTRUCTIONS
Home Care Instructions for EGD/EUS With Sedation    Diet:  - Resume your regular diet as tolerated unless otherwise instructed.  - start with light meals to minimize bloating.  - Do not drink alcohol today.    Medication:  - If you have questions about resuming your normal medications, please contact your Primary Care Physician.    Activities:  - Take it easy today. Do not return to work today.  - Do not drive today.  - Do not operate any machinery today (including kitchen equipment).    Colonoscopy:  - You may notice some rectal \"spotting\" (a little blood on the toilet tissue) for a day or two after the exam. This is normal.  - If you experience any rectal bleeding (not spotting), persistent tenderness or sharp severe abdominal pains, oral temperature over 100 degrees Farenheit, light-headedness or dizziness, or any other problems, contact your doctor.    EGD/EUS:  - You may have a sore throat for 2-3 days following the exam. This is normal. Gargling with warm salt water (1/2 tsp salt to 1 glass warm water) or using throat lozenges will help.  - If you experience any sharp pain in your neck, abdomen or chest, vomiting of blood, oral temperature over 100 degrees Farenheit, light-headedness or dizziness, or any other problems, contact your doctor.    **If unable to reach your doctor, please go to the [facility] Emergency Room**    - Your referring physician will receive a full report of your examination.  - If you do not hear from your doctor's office within two weeks of your biopsy, please call them for your results.    Additional Comments/Instructions (if applicable):

## 2024-11-27 NOTE — ANESTHESIA POSTPROCEDURE EVALUATION
[facility]    Monica Holguin Patient Status:  Hospital Outpatient Surgery   Age/Gender 64 year old female MRN [id number]   Location [facility] ENDOSCOPY PAIN CENTER Attending Fred Pena MD   Hosp Day # 0 PCP Aimee Chappell MD       Anesthesia Post-op Note    ESOPHAGOGASTRODUODENOSCOPY (EGD), ENDOSCOPIC ULTRASOUND (EUS) UPPER    Procedure Summary       Date: 11/27/24 Room / Location:  ENDOSCOPY 02 /  ENDOSCOPY    Anesthesia Start: 1106 Anesthesia Stop: 1135    Procedures:       ESOPHAGOGASTRODUODENOSCOPY (EGD), ENDOSCOPIC ULTRASOUND (EUS) UPPER      ENDOSCOPIC ULTRASOUND (EUS) Diagnosis:       Abnormal finding on GI tract imaging      Abnormal findings on diagnostic imaging of other parts of digestive tract      Endometrial cancer (HCC)      (gallstones, esophagitis)    Surgeons: Fred Pena MD Anesthesiologist: Devyn Lopez MD    Anesthesia Type: MAC ASA Status: 2            Anesthesia Type: MAC    Vitals Value Taken Time   / 73 11/27/24 1135   Temp  11/27/24 1135   Pulse 84 11/27/24 1135   Resp 16 11/27/24 1135   SpO2 97 11/27/24 1135       Patient Location: Endoscopy    Anesthesia Type: MAC    Airway Patency: patent    Postop Pain Control: adequate    Mental Status: mildly sedated but able to meaningfully participate in the post-anesthesia evaluation    Nausea/Vomiting: none    Cardiopulmonary/Hydration status: stable euvolemic    Complications: no apparent anesthesia related complications    Postop vital signs: stable    Comments: Report given to RN    Dental Exam: Unchanged from Preop    Patient to be discharged home when criteria met.

## 2024-11-27 NOTE — H&P
[facility]  Pre-op H and P    Monica Holguin Patient Status:  Hospital Outpatient Surgery    1960 MRN [id number]   Location [facility] Attending Fred Pena MD   Date 2024 PCP Aimee Chappell MD     CC: Abnormal imaging mediastinum and esophagus per PET  Endometrial carcinoma  H/o anal cancer    History of Present Illness:  Monica Holguin is a a(n) 64 year old female. Abnormal imaging mediastinum and esophagus per PET  Endometrial carcinoma  H/o anal cancer    History:  Past Medical History:    Anal cancer (HCC)    Anal cancer (HCC)    Anesthesia complication    slow to arouse    Anxiety state    Asthma (HCC)    excerise induced    Back problem    upper back pain from radiation    Bipolar disorder (HCC)    Bloating    Blood disorder    anemia resolved    Constipation    Years    Depression    Disorder of thyroid    Exposure to radiation    last dose 2014    Fatigue    Fibromyalgia    Frequent urination    Headache disorder    Neck problems    History of blood transfusion    History of depression    Hx of motion sickness    Hypothyroidism (acquired)    Dx in 2017    Irregular bowel habits    Leaking of urine    After surgery    Migraines    in the past    Nausea    Gallbladder attacks    Neoplasm, breast    Pain in joints    Chemo    Personal history of antineoplastic chemotherapy     last dose 2014, patient also completed radiation therapy     PONV (postoperative nausea and vomiting)    Presence of other cardiac implants and grafts    Left ankle    Restless leg syndrome    Restless leg syndrome    Sleep disturbance    Stress    Uncomfortable fullness after meals    Visual impairment    glasses,contacts    Wears glasses     Past Surgical History:   Procedure Laterality Date          Colonoscopy N/A 11/10/2017    Procedure: COLONOSCOPY;  Surgeon: Ayan Ayon DO;  Location:  ENDOSCOPY    Colonoscopy N/A 2018    Procedure:  COLONOSCOPY, POSSIBLE BIOPSY, POSSIBLE POLYPECTOMY 06958;  Surgeon: Ayan Ayon DO;  Location: [facility]    Hysterectomy      Estrada biopsy stereo nodule 1 site right (cpt=19081)      benign     Estrada biopsy stereo nodule 2 site bilat (cpt=19081/01091)      benign     Estrada localization wire 1 site left (cpt=19281)      Estrada localization wire 1 site right (cpt=19281)      Other      left knee surgery- allograft    Other      left ankle fracture     Other  08/2017    Anal exam under anesthesia    Other surgical history  10/2020    Port removal     Port, indwelling, imp      Sigmoidoscopy,diagnostic      Tonsillectomy  1963    Total abdom hysterectomy  9/26/24     Family History   Problem Relation Age of Onset    Cancer Father         lung    Other (anuyrsem) Mother     Thyroid disease Sister         Hypothyroidism on [place] thyroid    Cancer Self       reports that she has never smoked. She has never used smokeless tobacco. She reports current alcohol use. She reports that she does not use drugs.    Allergies:  Allergies[1]    Medications:  No current facility-administered medications for this encounter.    Physical Exam:    Height 5' 3\" (1.6 m), weight 187 lb (84.8 kg), not currently breastfeeding.    General: Appears alert, oriented x3 and in no acute distress.  CV: Normal rate   Lungs: Normal effort   Skin: Warm and dry.  Laboratory Data:       Imaging:      Assessment/Plan/Procedure:  Patient Active Problem List   Diagnosis    Hypothyroidism (acquired)    Class 1 obesity with serious comorbidity and body mass index (BMI) of 33.0 to 33.9 in adult    Restless leg syndrome    Cervical radiculopathy    Hypercalcemia    Hyperlipidemia    History of anal cancer    S/P parathyroidectomy    Endometrial cancer, FIGO stage IIIC2 (HCC)    Primary hyperparathyroidism (HCC)    Asthma with COPD (chronic obstructive pulmonary disease) (HCC)    Abnormal finding on GI tract imaging       Abnormal imaging mediastinum and  esophagus per PET  Endometrial carcinoma  H/o anal cancer    Plan:  GABRIELA Pena MD  11/27/2024  10:03 AM       [1]   Allergies  Allergen Reactions    Chlorhexidine HIVES    Citalopram HIVES    Etomidate ANAPHYLAXIS    Gabapentin OTHER (SEE COMMENTS)     swelling    Lexapro [Escitalopram] SWELLING     Legs and ankles    Mold WHEEZING    Pramipexole SWELLING    Pregabalin SWELLING    Quetiapine Fumarate HIVES, ITCHING and INSOMNIA    Dust OTHER (SEE COMMENTS)     Sneezing, sinus infection    Ropinirole OTHER (SEE COMMENTS)     Restless legs    Adhesive Tape RASH     Need to use surgery glue    Promethazine NAUSEA ONLY

## 2024-12-05 ENCOUNTER — OFFICE VISIT (OUTPATIENT)
Dept: HEMATOLOGY/ONCOLOGY | Age: 64
End: 2024-12-05
Attending: INTERNAL MEDICINE
Payer: MEDICARE

## 2024-12-05 VITALS
TEMPERATURE: 98 F | HEIGHT: 62.99 IN | HEART RATE: 97 BPM | BODY MASS INDEX: 33.66 KG/M2 | RESPIRATION RATE: 16 BRPM | OXYGEN SATURATION: 98 % | DIASTOLIC BLOOD PRESSURE: 82 MMHG | SYSTOLIC BLOOD PRESSURE: 136 MMHG | WEIGHT: 190 LBS

## 2024-12-05 DIAGNOSIS — C54.1 ENDOMETRIAL CANCER, FIGO STAGE IIIC2 (HCC): Primary | ICD-10-CM

## 2024-12-05 DIAGNOSIS — R59.0 LYMPHADENOPATHY, RETROPERITONEAL: ICD-10-CM

## 2024-12-05 DIAGNOSIS — Z51.11 ENCOUNTER FOR CHEMOTHERAPY MANAGEMENT: ICD-10-CM

## 2024-12-05 LAB
ALBUMIN SERPL-MCNC: 4.5 G/DL (ref 3.2–4.8)
ALBUMIN/GLOB SERPL: 1.8 {RATIO} (ref 1–2)
ALP LIVER SERPL-CCNC: 90 U/L
ALT SERPL-CCNC: 31 U/L
ANION GAP SERPL CALC-SCNC: 4 MMOL/L (ref 0–18)
AST SERPL-CCNC: 18 U/L (ref ?–34)
BASOPHILS # BLD AUTO: 0.02 X10(3) UL (ref 0–0.2)
BASOPHILS NFR BLD AUTO: 0.4 %
BILIRUB SERPL-MCNC: 0.4 MG/DL (ref 0.2–1.1)
BUN BLD-MCNC: 16 MG/DL (ref 9–23)
CALCIUM BLD-MCNC: 11.1 MG/DL (ref 8.7–10.4)
CHLORIDE SERPL-SCNC: 108 MMOL/L (ref 98–112)
CO2 SERPL-SCNC: 25 MMOL/L (ref 21–32)
CREAT BLD-MCNC: 0.81 MG/DL
EGFRCR SERPLBLD CKD-EPI 2021: 81 ML/MIN/1.73M2 (ref 60–?)
EOSINOPHIL # BLD AUTO: 0.04 X10(3) UL (ref 0–0.7)
EOSINOPHIL NFR BLD AUTO: 0.8 %
ERYTHROCYTE [DISTWIDTH] IN BLOOD BY AUTOMATED COUNT: 15.4 %
GLOBULIN PLAS-MCNC: 2.5 G/DL (ref 2–3.5)
GLUCOSE BLD-MCNC: 102 MG/DL (ref 70–99)
HCT VFR BLD AUTO: 33.5 %
HGB BLD-MCNC: 11.6 G/DL
IMM GRANULOCYTES # BLD AUTO: 0.04 X10(3) UL (ref 0–1)
IMM GRANULOCYTES NFR BLD: 0.8 %
LYMPHOCYTES # BLD AUTO: 0.87 X10(3) UL (ref 1–4)
LYMPHOCYTES NFR BLD AUTO: 16.9 %
MCH RBC QN AUTO: 30.4 PG (ref 26–34)
MCHC RBC AUTO-ENTMCNC: 34.6 G/DL (ref 31–37)
MCV RBC AUTO: 87.7 FL
MONOCYTES # BLD AUTO: 0.46 X10(3) UL (ref 0.1–1)
MONOCYTES NFR BLD AUTO: 8.9 %
NEUTROPHILS # BLD AUTO: 3.72 X10 (3) UL (ref 1.5–7.7)
NEUTROPHILS # BLD AUTO: 3.72 X10(3) UL (ref 1.5–7.7)
NEUTROPHILS NFR BLD AUTO: 72.2 %
OSMOLALITY SERPL CALC.SUM OF ELEC: 285 MOSM/KG (ref 275–295)
PLATELET # BLD AUTO: 226 10(3)UL (ref 150–450)
POTASSIUM SERPL-SCNC: 3.8 MMOL/L (ref 3.5–5.1)
PROT SERPL-MCNC: 7 G/DL (ref 5.7–8.2)
RBC # BLD AUTO: 3.82 X10(6)UL
SODIUM SERPL-SCNC: 137 MMOL/L (ref 136–145)
T4 FREE SERPL-MCNC: 1.4 NG/DL (ref 0.8–1.7)
TSI SER-ACNC: 1.24 UIU/ML (ref 0.55–4.78)
WBC # BLD AUTO: 5.2 X10(3) UL (ref 4–11)

## 2024-12-05 PROCEDURE — 99215 OFFICE O/P EST HI 40 MIN: CPT | Performed by: INTERNAL MEDICINE

## 2024-12-05 PROCEDURE — 80053 COMPREHEN METABOLIC PANEL: CPT

## 2024-12-05 PROCEDURE — 96415 CHEMO IV INFUSION ADDL HR: CPT

## 2024-12-05 PROCEDURE — G2211 COMPLEX E/M VISIT ADD ON: HCPCS | Performed by: INTERNAL MEDICINE

## 2024-12-05 PROCEDURE — 96417 CHEMO IV INFUS EACH ADDL SEQ: CPT

## 2024-12-05 PROCEDURE — 96413 CHEMO IV INFUSION 1 HR: CPT

## 2024-12-05 PROCEDURE — 96367 TX/PROPH/DG ADDL SEQ IV INF: CPT

## 2024-12-05 PROCEDURE — 84439 ASSAY OF FREE THYROXINE: CPT

## 2024-12-05 PROCEDURE — 96375 TX/PRO/DX INJ NEW DRUG ADDON: CPT

## 2024-12-05 PROCEDURE — 85025 COMPLETE CBC W/AUTO DIFF WBC: CPT

## 2024-12-05 PROCEDURE — S0028 INJECTION, FAMOTIDINE, 20 MG: HCPCS | Performed by: INTERNAL MEDICINE

## 2024-12-05 PROCEDURE — 84443 ASSAY THYROID STIM HORMONE: CPT

## 2024-12-05 PROCEDURE — 96549 UNLISTED CHEMOTHERAPY PX: CPT

## 2024-12-05 RX ORDER — FAMOTIDINE 10 MG/ML
20 INJECTION, SOLUTION INTRAVENOUS ONCE
Status: CANCELLED | OUTPATIENT
Start: 2024-12-06

## 2024-12-05 RX ORDER — DIPHENHYDRAMINE HYDROCHLORIDE 50 MG/ML
50 INJECTION INTRAMUSCULAR; INTRAVENOUS ONCE
Status: COMPLETED | OUTPATIENT
Start: 2024-12-05 | End: 2024-12-05

## 2024-12-05 RX ORDER — PALONOSETRON 0.05 MG/ML
0.25 INJECTION, SOLUTION INTRAVENOUS ONCE
Status: COMPLETED | OUTPATIENT
Start: 2024-12-05 | End: 2024-12-05

## 2024-12-05 RX ORDER — DIPHENHYDRAMINE HYDROCHLORIDE 50 MG/ML
50 INJECTION INTRAMUSCULAR; INTRAVENOUS ONCE
Status: CANCELLED | OUTPATIENT
Start: 2024-12-06

## 2024-12-05 RX ORDER — FAMOTIDINE 10 MG/ML
20 INJECTION, SOLUTION INTRAVENOUS ONCE
Status: COMPLETED | OUTPATIENT
Start: 2024-12-05 | End: 2024-12-05

## 2024-12-05 RX ORDER — PALONOSETRON 0.05 MG/ML
0.25 INJECTION, SOLUTION INTRAVENOUS ONCE
Status: CANCELLED
Start: 2024-12-06 | End: 2024-12-06

## 2024-12-05 RX ADMIN — PALONOSETRON 0.25 MG: 0.05 INJECTION, SOLUTION INTRAVENOUS at 11:08:00

## 2024-12-05 RX ADMIN — DIPHENHYDRAMINE HYDROCHLORIDE 50 MG: 50 INJECTION INTRAMUSCULAR; INTRAVENOUS at 11:12:00

## 2024-12-05 RX ADMIN — FAMOTIDINE 20 MG: 10 INJECTION, SOLUTION INTRAVENOUS at 11:10:00

## 2024-12-05 NOTE — PROGRESS NOTES
Cancer Center Progress Note  Patient Name: Monica Holguin   YOB: 1960   Medical Record Number: [id number]   [facility]: [id number]   Attending Physician: Celio Trujillo M.D.       Date of Visit: 12/5/2024      Chief Complaint:  Chief Complaint   Patient presents with    Follow - Up        Oncologic History:  Monica Holguin is a 64 year old female referred by Dr. 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  The patient was initially diagnosed with anal cancer in 2013 when she complained of rectal bleeding and inability to use a tampon during her menstrual periods.  She was seen by gynecology, who detected a vaginal mass and referred her to GI for colonoscopy and biopsy.  She also had severe Fe deficiency anemia, at the time, with a hgb around 4.  She was treated with chemoRT by Dr. Berumen at [facility] in [place].  She recalls Mitomycin C and 5FU as the chemo agents. She had a complete response.  She transferred her care to [facility], due to an insurance change, and has been following there with q6 month exams and annual CT.  She has been told that there has been no evidence of recurrence.  Her insurance has mandated a change, again, and she has been referred here for ongoing care.  She has also seen Dr. Ayon, Surgery, who has planned an exam under anesthesthesia to document the absence of recurrence.  She complains of intermittent BRBPR, that she attributes to an anal fissure that is aggravated by her intermittent constipation.  She has also been told that her Thyroid is \"off\" and that may be contributing to her constipation.  She reports a history of IBS with constipation prior to her cancer diagnosis.  Her colonoscopy in November, 2016 was reportedly normal.  Repeat C-scope in Nov, 2017 revealed no evidence of disease.        She saw gynecology for an exam on 8/9/17.        She was seen in the ER on 8/23/24 complaining of abdominal pain. CT revealed a large endometrial mass  and prominent but not enlarged retroperitoneal nodes. She was referred back for evaluation. She was referred to gyn onc and saw Dr. Valdivia    On 9/26/24 she underwent an ex-lap, MONSE/BSO, periaortic lymph node dissection at [facility].   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 Tumor deposits were seen in the lymphatics. Tumor cells were positive for p16. The tumor involved the cervix and 3 of 5 periaortic nodes were positive for metastatic carcinoma. MMR was intact.  Stage IIIc2. Dr. Valdivia recommended 6 cycles of adjuvant Carbo/Taxol with a checkpoint inhibitor. This would possibly be followed by periaortic radiation.     History of Present Illness:  Pt is here for follow up. She tolerated cycle 2 with fatigue and a \"weird feeling\" in her fingers and toes.     Performance Status:  ECOG 1    Past Medical History:  Past Medical History:    Anal cancer (HCC)    Anal cancer (HCC)    Anesthesia complication    slow to arouse    Anxiety state    Asthma (HCC)    excerise induced    Back problem    upper back pain from radiation    Bipolar disorder (HCC)    Bloating    Blood disorder    anemia resolved    Constipation    Years    Depression    Disorder of thyroid    Exposure to radiation    last dose February 2014    Fatigue    Fibromyalgia    Frequent urination    Headache disorder    Neck problems    History of blood transfusion    History of depression    Hx of motion sickness    Hypothyroidism (acquired)    Dx in 8/2017    Irregular bowel habits    Leaking of urine    After surgery    Migraines    in the past    Nausea    Gallbladder attacks    Neoplasm, breast    Pain in joints    Chemo    Personal history of antineoplastic chemotherapy     last dose 2/2014, patient also completed radiation therapy 2014    PONV (postoperative nausea and vomiting)    Presence of other cardiac implants and grafts    Left ankle    Restless leg syndrome    Restless leg syndrome    Sleep disturbance     Stress    Uncomfortable fullness after meals    Visual impairment    glasses,contacts    Wears glasses       Past Surgical History:  Past Surgical History:   Procedure Laterality Date          Colonoscopy N/A 11/10/2017    Procedure: COLONOSCOPY;  Surgeon: Ayan Ayon DO;  Location:  ENDOSCOPY    Colonoscopy N/A 2018    Procedure: COLONOSCOPY, POSSIBLE BIOPSY, POSSIBLE POLYPECTOMY 50944;  Surgeon: Ayan Ayon DO;  Location: [facility]    Hysterectomy      Estrada biopsy stereo nodule 1 site right (cpt=19081)      benign     Estrada biopsy stereo nodule 2 site bilat (cpt=19081/01987)      benign     Estrada localization wire 1 site left (cpt=19281)      Estrada localization wire 1 site right (cpt=19281)      Other      left knee surgery- allograft    Other      left ankle fracture     Other  2017    Anal exam under anesthesia    Other surgical history  10/2020    Port removal     Port, indwelling, imp      Sigmoidoscopy,diagnostic      Tonsillectomy  1963    Total abdom hysterectomy  24       Family History:  Family History   Problem Relation Age of Onset    Cancer Father         lung    Other (anuyrsem) Mother     Thyroid disease Sister         Hypothyroidism on [place] thyroid    Cancer Self        Social History:  Social History     Socioeconomic History    Marital status:      Spouse name: Not on file    Number of children: Not on file    Years of education: Not on file    Highest education level: Not on file   Occupational History    Not on file   Tobacco Use    Smoking status: Never    Smokeless tobacco: Never    Tobacco comments:     non-smoker   Vaping Use    Vaping status: Never Used   Substance and Sexual Activity    Alcohol use: Yes     Comment: rare    Drug use: No    Sexual activity: Not Currently   Other Topics Concern     Service Not Asked    Blood Transfusions Not Asked    Caffeine Concern Yes     Comment: 1 cup coffee per day    Occupational Exposure Not Asked     Hobby Hazards Not Asked    Sleep Concern Not Asked    Stress Concern Not Asked    Weight Concern Not Asked    Special Diet Not Asked    Back Care Not Asked    Exercise Yes     Comment: walking    Bike Helmet Not Asked    Seat Belt Yes    Self-Exams Not Asked   Social History Narrative    Not on file     Social Drivers of Health     Financial Resource Strain: Not on file   Food Insecurity: Not on file   Transportation Needs: Not on file   Physical Activity: Not on file   Stress: Not on file   Social Connections: Not on file   Housing Stability: Not on file       Current Medications:    Current Outpatient Medications:     pantoprazole 40 MG Oral Tab EC, TAKE 1 TABLET(40 MG) BY MOUTH DAILY 30 MINUTES BEFORE BREAKFAST, Disp: 90 tablet, Rfl: 0    metoclopramide 10 MG Oral Tab, Take 1 tablet (10 mg total) by mouth 4 (four) times daily as needed (nausea)., Disp: 60 tablet, Rfl: 3    ondansetron 8 MG Oral Tablet Dispersible, Take 1 tablet (8 mg total) by mouth every 8 (eight) hours as needed for Nausea., Disp: 30 tablet, Rfl: 3    lidocaine-prilocaine 2.5-2.5 % External Cream, Apply to site 1 hour prior to port a cath needle insertion, Disp: 1 each, Rfl: 0    levothyroxine 100 MCG Oral Tab, Take 1 tablet (100 mcg total) by mouth before breakfast., Disp: 90 tablet, Rfl: 0    Gabapentin Enacarbil ER (HORIZANT) 300 MG Oral Tab CR, Take 100 mg by mouth daily., Disp: , Rfl:     Allergies:  Allergies   Allergen Reactions    Chlorhexidine HIVES    Citalopram HIVES    Etomidate ANAPHYLAXIS    Gabapentin OTHER (SEE COMMENTS)     swelling    Lexapro [Escitalopram] SWELLING     Legs and ankles    Mold WHEEZING    Pramipexole SWELLING    Pregabalin SWELLING    Quetiapine Fumarate HIVES, ITCHING and INSOMNIA    Dust OTHER (SEE COMMENTS)     Sneezing, sinus infection    Ropinirole OTHER (SEE COMMENTS)     Restless legs    Adhesive Tape RASH     Need to use surgery glue    Promethazine NAUSEA ONLY        Review of  Systems:    Constitutional No fevers, chills, night sweats, excessive fatigue or weight loss.   Eyes No significant visual difficulties. No diplopia. No yellowing of the eyes.   Hematologic/Lymphatic No easy bruising or bleeding.  No any tender or palpable lymph nodes.   Respiratory No dyspnea, Pleuritic chest pain, cough or hemoptysis.   Cardiovascular No anginal chest pain, palpitations or orthopnea.   Gastrointestinal No nausea, vomiting, diarrhea, GI bleeding, or constipation.   Genitorurinary  No hematuria, dysuria, or incontinence. No abnormal bleeding.   Integumentary No rashes or yellowing of the skin   Neurologic No headache, blurred vision, and no areas of focal weakness. Normal gait.   Psychiatric No insomnia, depression, phyllis or mood swings.         Vital Signs:  Height: 160 cm (5' 2.99\") (12/05 0900)  Weight: 86.2 kg (190 lb) (12/05 0900)  BSA (Calculated - sq m): 1.89 sq meters (12/05 0900)  Pulse: 97 (12/05 0900)  BP: 136/82 (12/05 0900)  Temp: 97.9 °F (36.6 °C) (12/05 0900)  Do Not Use - Resp Rate: --  SpO2: 98 % (12/05 0900)    Physical Examination:    Laboratory:  Recent Labs     12/05/24  0850   RBC 3.82   HGB 11.6 L   HCT 33.5 L   MCV 87.7   MCH 30.4   MCHC 34.6   RDW 15.4   NEPRELIM 3.72   WBC 5.2   .0     Recent Labs     12/05/24  0850    H   BUN 16   CREATSERUM 0.81   CA 11.1 H   ALB 4.5      K 3.8      CO2 25.0   ALKPHO 90   AST 18   ALT 31   BILT 0.4   TP 7.0     Radiology:  PET 10/29/24:  CONCLUSION:    1. Multifocal retroperitoneal lymphadenopathy with abnormal radiotracer uptake as described above.   2. Bilateral shotty inguinal lymph nodes with abnormal radiotracer uptake involving a 8 mm lymph node in the left inguinal region with a max SUV of 8.93 consistent with lymphadenopathy.  Right inguinal lymph node is equivocal with max SUV of 2.82.   3. Focus of lymphadenopathy within the posterior mediastinum, involving the left paraesophageal region at the level of  the midesophagus with a max SUV of 8.27.  Additionally, at the GE junction there is a softened JOSEFA wall thickening with abnormal   increased radiotracer uptake of 7.08.  The possibility of a distal esophageal neoplasm cannot be excluded.             Pathology:  Final Diagnosis:   Right inferior parathyroid:  -Hypercellular parathyroid gland, 10 mm, with rim tissue.  -See comment.       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 Adjuvant chemo-immunotherapy has been recommended with Carbo Taxol + A checkpoint inhibitor x6 cycles. Radiation use is limited in her case due to the prior Rt for the anal canal cancer. Once she completes the adjuvant chemo-Immunotherapy, will plan for her to see Radiation to consider RT to the periaortic nodes, though I was concerned about the esophageal findings and mediastinal node. EGD revealed only esophagitis.  Proceed with Cycle 3 of Carbo/Taxol/Keytruda.    Retroperitoneal adenopathy. Consider RT upon completion of adjuvant chemo.     History of Anal Canal cancer:  KASSANDRA.  Continue follow up with surgery. She is overdue for her colonoscopy. She is scheduled to see Dr. Ayon in September.     Planned Follow Up:  3 weeks.    The patient and I have a longitudinal relationship to address/treat this serious and complex condition      Electronically Signed by:    Celio Trujillo M.D.  [facility]

## 2024-12-05 NOTE — PROGRESS NOTES
Pt here for C3 D1 Drug(s)Keytruda, Taxol, Carboplatin.  Arrives Ambulating independently, accompanied by Spouse     Patient was evaluated today by MD.    Oral medications included in this regimen:  no    Patient confirms comprehension of cancer treatment schedule:  yes    Pregnancy screening:  Denies possibility of pregnancy    Modifications in dose or schedule:  No    Medications appearance and physical integrity checked by RN: yes.    Chemotherapy IV pump settings verified by 2 RNs:  Yes.  Frequency of blood return and site check throughout administration: Prior to administration, Prior to each drug, and At completion of therapy     Infusion/treatment outcome:  patient tolerated treatment without incident    Education Record    Learner:  Patient  Barriers / Limitations:  None  Method:  Discussion  Education / instructions given:  Medication, plan of care  Outcome:  Shows understanding    Discharged Home, Ambulating independently, accompanied by:Spouse    Patient/family verbalized understanding of future appointments: by [employer]

## 2024-12-05 NOTE — PROGRESS NOTES
Pt here for follow up and treatment.   She states she has a dry cough and a bit of a runny nose.  She complains of joint pain.

## 2024-12-16 ENCOUNTER — HOSPITAL ENCOUNTER (OUTPATIENT)
Age: 64
Discharge: HOME | End: 2024-12-16
Payer: MEDICARE

## 2024-12-16 ENCOUNTER — APPOINTMENT (OUTPATIENT)
Dept: GENERAL RADIOLOGY | Age: 64
End: 2024-12-16
Attending: NURSE PRACTITIONER
Payer: MEDICARE

## 2024-12-16 ENCOUNTER — TELEPHONE (OUTPATIENT)
Age: 64
End: 2024-12-16

## 2024-12-16 VITALS
RESPIRATION RATE: 18 BRPM | DIASTOLIC BLOOD PRESSURE: 87 MMHG | OXYGEN SATURATION: 97 % | SYSTOLIC BLOOD PRESSURE: 152 MMHG | HEART RATE: 85 BPM | TEMPERATURE: 98 F

## 2024-12-16 DIAGNOSIS — R05.1 ACUTE COUGH: ICD-10-CM

## 2024-12-16 DIAGNOSIS — J40 SINOBRONCHITIS: Primary | ICD-10-CM

## 2024-12-16 DIAGNOSIS — J32.9 SINOBRONCHITIS: Primary | ICD-10-CM

## 2024-12-16 LAB
#MXD IC: 0.4 X10ˆ3/UL (ref 0.1–1)
BUN BLD-MCNC: 13 MG/DL (ref 7–18)
CHLORIDE BLD-SCNC: 105 MMOL/L (ref 98–112)
CO2 BLD-SCNC: 25 MMOL/L (ref 21–32)
CREAT BLD-MCNC: 0.7 MG/DL
EGFRCR SERPLBLD CKD-EPI 2021: 97 ML/MIN/1.73M2 (ref 60–?)
GLUCOSE BLD-MCNC: 89 MG/DL (ref 70–99)
HCT VFR BLD AUTO: 35.9 %
HCT VFR BLD CALC: 34 %
HGB BLD-MCNC: 11.7 G/DL
ISTAT IONIZED CALCIUM FOR CHEM 8: 1.31 MMOL/L (ref 1.12–1.32)
LYMPHOCYTES # BLD AUTO: 1.2 X10ˆ3/UL (ref 1–4)
LYMPHOCYTES NFR BLD AUTO: 43.3 %
MCH RBC QN AUTO: 28.9 PG (ref 26–34)
MCHC RBC AUTO-ENTMCNC: 32.6 G/DL (ref 31–37)
MCV RBC AUTO: 88.6 FL (ref 80–100)
MIXED CELL %: 16 %
NEUTROPHILS # BLD AUTO: 1.2 X10ˆ3/UL (ref 1.5–7.7)
NEUTROPHILS NFR BLD AUTO: 40.7 %
PLATELET # BLD AUTO: 255 X10ˆ3/UL (ref 150–450)
POCT INFLUENZA A: NEGATIVE
POCT INFLUENZA B: NEGATIVE
POTASSIUM BLD-SCNC: 4.1 MMOL/L (ref 3.6–5.1)
RBC # BLD AUTO: 4.05 X10ˆ6/UL
S PYO AG THROAT QL: NEGATIVE
SARS-COV-2 RNA RESP QL NAA+PROBE: NOT DETECTED
SODIUM BLD-SCNC: 139 MMOL/L (ref 136–145)
WBC # BLD AUTO: 2.8 X10ˆ3/UL (ref 4–11)

## 2024-12-16 PROCEDURE — 87502 INFLUENZA DNA AMP PROBE: CPT | Performed by: NURSE PRACTITIONER

## 2024-12-16 PROCEDURE — 99214 OFFICE O/P EST MOD 30 MIN: CPT | Performed by: NURSE PRACTITIONER

## 2024-12-16 PROCEDURE — 85025 COMPLETE CBC W/AUTO DIFF WBC: CPT | Performed by: NURSE PRACTITIONER

## 2024-12-16 PROCEDURE — 87880 STREP A ASSAY W/OPTIC: CPT | Performed by: NURSE PRACTITIONER

## 2024-12-16 PROCEDURE — 80047 BASIC METABLC PNL IONIZED CA: CPT | Performed by: NURSE PRACTITIONER

## 2024-12-16 PROCEDURE — 71046 X-RAY EXAM CHEST 2 VIEWS: CPT | Performed by: NURSE PRACTITIONER

## 2024-12-16 PROCEDURE — U0002 COVID-19 LAB TEST NON-CDC: HCPCS | Performed by: NURSE PRACTITIONER

## 2024-12-16 RX ORDER — BENZONATATE 100 MG/1
100 CAPSULE ORAL 3 TIMES DAILY PRN
Qty: 30 CAPSULE | Refills: 0 | Status: SHIPPED | OUTPATIENT
Start: 2024-12-16 | End: 2024-12-16 | Stop reason: SDUPTHER

## 2024-12-16 RX ORDER — AMOXICILLIN 875 MG/1
875 TABLET, COATED ORAL 2 TIMES DAILY
Qty: 14 TABLET | Refills: 0 | Status: SHIPPED | OUTPATIENT
Start: 2024-12-16 | End: 2024-12-16 | Stop reason: SDUPTHER

## 2024-12-16 RX ORDER — AMOXICILLIN 875 MG/1
875 TABLET, COATED ORAL 2 TIMES DAILY
Qty: 14 TABLET | Refills: 0 | Status: SHIPPED | OUTPATIENT
Start: 2024-12-16 | End: 2024-12-23

## 2024-12-16 RX ORDER — FLUTICASONE PROPIONATE 50 MCG
2 SPRAY, SUSPENSION (ML) NASAL DAILY
Qty: 16 G | Refills: 0 | Status: SHIPPED | OUTPATIENT
Start: 2024-12-16 | End: 2025-01-15

## 2024-12-16 RX ORDER — BENZONATATE 100 MG/1
100 CAPSULE ORAL 3 TIMES DAILY PRN
Qty: 30 CAPSULE | Refills: 0 | Status: SHIPPED | OUTPATIENT
Start: 2024-12-16 | End: 2025-01-15

## 2024-12-16 RX ORDER — FLUTICASONE PROPIONATE 50 MCG
2 SPRAY, SUSPENSION (ML) NASAL DAILY
Qty: 16 G | Refills: 0 | Status: SHIPPED | OUTPATIENT
Start: 2024-12-16 | End: 2024-12-16 | Stop reason: SDUPTHER

## 2024-12-16 NOTE — ED PROVIDER NOTES
Patient Seen in: [facility]      History     Chief Complaint   Patient presents with    Cough/URI    Fever    Sinus Problem     Stated Complaint: coughing fever sinus drainage    Subjective:   64-year-old female (with past medical history of anal cancer and now uterine cancer receiving chemotherapy) presents with complaint of a cough that began about 2 weeks ago.  Cough seemed to improve but never completely resolved and now seems to be getting worse in the last 2 days.    Cough worse at night.   Next chemo treatment scheduled for 12/26/24.   States she had a \"fever\" of 98.7.  OTC's include Dayquil, Mucinex.     Pt denies chills, sweats, shortness of breath, hemoptysis, chest pain, dizziness, weakness, nausea, vomiting, diarrhea, dysuria, frequency.     The history is provided by the patient.         Objective:     Past Medical History:    Anal cancer (HCC)    Anal cancer (HCC)    Anesthesia complication    slow to arouse    Anxiety state    Asthma (HCC)    excerise induced    Back problem    upper back pain from radiation    Bipolar disorder (HCC)    Bloating    Blood disorder    anemia resolved    Constipation    Years    Depression    Disorder of thyroid    Exposure to radiation    last dose February 2014    Fatigue    Fibromyalgia    Frequent urination    Headache disorder    Neck problems    History of blood transfusion    History of depression    Hx of motion sickness    Hypothyroidism (acquired)    Dx in 8/2017    Irregular bowel habits    Leaking of urine    After surgery    Migraines    in the past    Nausea    Gallbladder attacks    Neoplasm, breast    Pain in joints    Chemo    Personal history of antineoplastic chemotherapy     last dose 2/2014, patient also completed radiation therapy 2014    PONV (postoperative nausea and vomiting)    Presence of other cardiac implants and grafts    Left ankle    Restless leg syndrome    Restless leg syndrome    Sleep disturbance    Stress    Uncomfortable  fullness after meals    Visual impairment    glasses,contacts    Wears glasses              Past Surgical History:   Procedure Laterality Date          Colonoscopy N/A 11/10/2017    Procedure: COLONOSCOPY;  Surgeon: Ayan Ayon DO;  Location:  ENDOSCOPY    Colonoscopy N/A 2018    Procedure: COLONOSCOPY, POSSIBLE BIOPSY, POSSIBLE POLYPECTOMY 56870;  Surgeon: Ayan Ayon DO;  Location: [facility]    Hysterectomy      Estrada biopsy stereo nodule 1 site right (cpt=19081)      benign     Estrada biopsy stereo nodule 2 site bilat (cpt=19081/55395)      benign     Estrada localization wire 1 site left (cpt=19281)      Estrada localization wire 1 site right (cpt=19281)      Other      left knee surgery- allograft    Other      left ankle fracture     Other  2017    Anal exam under anesthesia    Other surgical history  10/2020    Port removal     Port, indwelling, imp      Sigmoidoscopy,diagnostic      Tonsillectomy  1963    Total abdom hysterectomy  24                Social History     Socioeconomic History    Marital status:    Tobacco Use    Smoking status: Never    Smokeless tobacco: Never    Tobacco comments:     non-smoker   Vaping Use    Vaping status: Never Used   Substance and Sexual Activity    Alcohol use: Yes     Comment: rare    Drug use: No    Sexual activity: Not Currently   Other Topics Concern    Caffeine Concern Yes     Comment: 1 cup coffee per day    Exercise Yes     Comment: walking    Seat Belt Yes              Review of Systems   Constitutional:  Negative for appetite change, chills, diaphoresis, fatigue and fever.   HENT:  Positive for congestion and rhinorrhea. Negative for sore throat and trouble swallowing.    Respiratory:  Positive for cough. Negative for chest tightness, shortness of breath and wheezing.    Cardiovascular:  Negative for chest pain, palpitations and leg swelling.   Gastrointestinal:  Negative for abdominal pain, blood in stool, diarrhea, nausea and  vomiting.   Genitourinary:  Negative for dysuria, flank pain and frequency.   Musculoskeletal:  Negative for gait problem and neck pain.   Skin:  Negative for rash.   Neurological:  Negative for dizziness, syncope, weakness and light-headedness.       Positive for stated complaint: coughing fever sinus drainage  Other systems are as noted in HPI.  Constitutional and vital signs reviewed.      All other systems reviewed and negative except as noted above.    Physical Exam     ED Triage Vitals   BP 12/16/24 1704 (!) 145/97   Pulse 12/16/24 1704 88   Resp 12/16/24 1704 16   Temp 12/16/24 1704 98.7 °F (37.1 °C)   Temp src 12/16/24 1704 Oral   SpO2 12/16/24 1704 99 %   O2 Device 12/16/24 1846 None (Room air)       Current Vitals:   No data recorded      Physical Exam  Vitals and nursing note reviewed.   Constitutional:       General: She is not in acute distress.     Appearance: Normal appearance. She is not ill-appearing, toxic-appearing or diaphoretic.   HENT:      Head: Normocephalic.      Right Ear: Tympanic membrane normal.      Left Ear: Tympanic membrane normal.      Nose: Congestion present.      Mouth/Throat:      Mouth: Mucous membranes are moist.      Pharynx: Oropharynx is clear.   Eyes:      General: No scleral icterus.     Conjunctiva/sclera: Conjunctivae normal.   Cardiovascular:      Rate and Rhythm: Normal rate and regular rhythm.      Heart sounds: No murmur heard.  Pulmonary:      Effort: Pulmonary effort is normal. No respiratory distress.      Breath sounds: No stridor. No wheezing, rhonchi or rales.   Chest:      Chest wall: No tenderness.   Abdominal:      General: Abdomen is flat. Bowel sounds are normal.      Palpations: Abdomen is soft.      Tenderness: There is no abdominal tenderness. There is no right CVA tenderness, left CVA tenderness or guarding.   Musculoskeletal:         General: Normal range of motion.      Cervical back: Normal range of motion and neck supple. No tenderness.    Lymphadenopathy:      Cervical: No cervical adenopathy.   Skin:     General: Skin is warm and dry.      Coloration: Skin is not pale.      Findings: No bruising or rash.   Neurological:      General: No focal deficit present.      Mental Status: She is alert.   Psychiatric:         Mood and Affect: Mood normal.            ED Course     Labs Reviewed   POCT CBC - Abnormal; Notable for the following components:       Result Value    WBC IC 2.8 (*)     HGB IC 11.7 (*)     # Neutrophil 1.2 (*)     All other components within normal limits   POCT RAPID STREP - Normal   POCT ISTAT CHEM8 CARTRIDGE - Normal   POCT FLU TEST - Normal    Narrative:     This assay is a rapid molecular in vitro test utilizing nucleic acid amplification of influenza A and B viral RNA.   RAPID SARS-COV-2 BY PCR - Normal          MDM        Medical Decision Making  64-year-old female (with past medical history of anal cancer and now uterine cancer receiving chemotherapy) presents with complaint of a cough that began about 2 weeks ago.  Cough seemed to improve but never completely resolved and now seems to be getting worse in the last 2 days.    Diff dx include pneumonia, bronchitis, strep, Flu/Covid.   On exam, pt is in NAD, vitals normal, afebrile, lungs clear bilaterally.   Flu/Covid/ Strep results all negative.   CXR without acute findings.   Reviewed labs with pt; WBC decreased to 2.8 from 5.2 in the last 11 days. Also had chemo tx on 12/5 slight decrease may be expected at this point.    Discussed with pt going to ED vs follow up with Oncology tomorrow. Pt prefers to contact MD tomorrow to discuss.    Will treat for sinobronchitis with Amoxicillin, Flonase, Tessalon.   ED precautions reemphasized denton for fever, shaking chills, lethargy, nausea, vomiting, headache.    Oncology follow up tomorrow.   Pt agreeable to plan.     Amount and/or Complexity of Data Reviewed  External Data Reviewed: labs and notes.  Labs: ordered.  Radiology:  ordered.    Risk  OTC drugs.  Prescription drug management.        Disposition and Plan     Clinical Impression:  1. Sinobronchitis    2. Acute cough         Disposition:  Discharge  12/16/2024  6:48 pm    Follow-up:  Aimee Chappell MD  [address] [id number] [phone]      As needed    Celio Trujillo MD  [address] [facility]  [facility] [id number] [phone]    Call in 1 day  call tomorrow morning.          Medications Prescribed:  Discharge Medication List as of 12/16/2024  6:54 PM        START taking these medications    Details   amoxicillin 875 MG Oral Tab Take 1 tablet (875 mg total) by mouth 2 (two) times daily for 7 days., Normal, Disp-14 tablet, R-0      benzonatate 100 MG Oral Cap Take 1 capsule (100 mg total) by mouth 3 (three) times daily as needed for cough., Normal, Disp-30 capsule, R-0      fluticasone propionate 50 MCG/ACT Nasal Suspension 2 sprays by Nasal route daily., Normal, Disp-16 g, R-0                 Supplementary Documentation:

## 2024-12-16 NOTE — ED INITIAL ASSESSMENT (HPI)
Cough started after thanksgiving. Unsure if it really resolved. Cough is getting worse since Sunday. Fever initially but no longer having them. Pt is currently in chemo. Next treatment is scheduled for 12/26.

## 2024-12-16 NOTE — TELEPHONE ENCOUNTER
Toxicities: C3 D1 Paclitaxel/Carboplatin/Pembrolizumab on 12/5/2024    Runny Nose/Post Nasal Drip/Head Congestion/Dry Cough    Monica reports that she has a family get together on 12/7/2024 & 12/8/2024. The next day she started to have low grade fever, runny nose, post nasal drip, head congestion and a dry cough that gets worse at night. The fevers stopped three days ago. She denies sore throat, chest congestion, dyspnea at rest or with exertion, generalized body aches, nausea, vomiting or diarrhea. No urinary urgency, frequency or burning.     I explained we can't test for flu, covid, RSV or strep in the office. I asked her to present to the [facility] for evaluation and testing. She said she will go to the one in [place] this afternoon.

## 2024-12-16 NOTE — TELEPHONE ENCOUNTER
Patient of Dr. Trujillo. Patient states she has had a cough for one week and patient states she had a low grade fever but it broke about three days ago. Patient states she is so worn out from coughing. Patient doesn't know if she should see someone, and she doesn't want to miss her treatment is 12/26. Patient's phone is [phone]. Called 12/16/24 KM

## 2024-12-17 ENCOUNTER — TELEPHONE (OUTPATIENT)
Age: 64
End: 2024-12-17

## 2024-12-17 NOTE — DISCHARGE INSTRUCTIONS
We recommend the following for your condition:  Amoxicillin: take 1 tab twice a day for 7 days.   Flonase 2 sprays to each side of nose once a day - con't for 2-4 weeks  Nasal saline - either spray (\"Ocean\" brand) or [facility] Med Sinus Rinse 3 times a day  Tessalon: may take 1 tab up to three times a day IF NEEDED for coughing.   Rest and push fluids  Hot lemon tea with honey  Steam twice a day (either in shower or with cup of hot water and a towel over your head)     Call your Oncologist tomorrow to discuss plan.     If any fever, chills, shortness of breath, weakness, vomiting, diarrhea or other concerning symptoms please go to the Emergency room.

## 2024-12-17 NOTE — TELEPHONE ENCOUNTER
12/5/2024 - C3D1 - Carbo/Taxol/Keytruda    Patient was at Urgent care yesterday for cough.  Tested Covid, Strep, Flu, Cxr all negative. Dx with bronchitis and prescribed    Amoxicillin 875 mg, Benzonatate 100 mg, ans fluticasone Propionate 50 MCG.  Was told to call to be sure okay to take per Dr Trujillo.    Requesting call back with instructions.  Can leave message if needed currently at work.

## 2024-12-26 ENCOUNTER — OFFICE VISIT (OUTPATIENT)
Age: 64
End: 2024-12-26
Attending: INTERNAL MEDICINE
Payer: MEDICARE

## 2024-12-26 ENCOUNTER — APPOINTMENT (OUTPATIENT)
Age: 64
End: 2024-12-26
Attending: INTERNAL MEDICINE
Payer: MEDICARE

## 2024-12-26 VITALS
OXYGEN SATURATION: 98 % | RESPIRATION RATE: 16 BRPM | TEMPERATURE: 98 F | HEIGHT: 62.99 IN | HEART RATE: 82 BPM | SYSTOLIC BLOOD PRESSURE: 122 MMHG | WEIGHT: 191 LBS | BODY MASS INDEX: 33.84 KG/M2 | DIASTOLIC BLOOD PRESSURE: 87 MMHG

## 2024-12-26 DIAGNOSIS — C54.1 ENDOMETRIAL CANCER, FIGO STAGE IIIC2 (HCC): Primary | ICD-10-CM

## 2024-12-26 LAB
ALBUMIN SERPL-MCNC: 4.5 G/DL (ref 3.2–4.8)
ALBUMIN/GLOB SERPL: 2 {RATIO} (ref 1–2)
ALP LIVER SERPL-CCNC: 91 U/L
ALT SERPL-CCNC: 30 U/L
ANION GAP SERPL CALC-SCNC: 6 MMOL/L (ref 0–18)
AST SERPL-CCNC: 19 U/L (ref ?–34)
BASOPHILS # BLD AUTO: 0.02 X10(3) UL (ref 0–0.2)
BASOPHILS NFR BLD AUTO: 0.5 %
BILIRUB SERPL-MCNC: 0.4 MG/DL (ref 0.2–1.1)
BUN BLD-MCNC: 15 MG/DL (ref 9–23)
CALCIUM BLD-MCNC: 10.5 MG/DL (ref 8.7–10.4)
CHLORIDE SERPL-SCNC: 106 MMOL/L (ref 98–112)
CO2 SERPL-SCNC: 27 MMOL/L (ref 21–32)
CREAT BLD-MCNC: 0.85 MG/DL
EGFRCR SERPLBLD CKD-EPI 2021: 76 ML/MIN/1.73M2 (ref 60–?)
EOSINOPHIL # BLD AUTO: 0.06 X10(3) UL (ref 0–0.7)
EOSINOPHIL NFR BLD AUTO: 1.5 %
ERYTHROCYTE [DISTWIDTH] IN BLOOD BY AUTOMATED COUNT: 16.2 %
GLOBULIN PLAS-MCNC: 2.2 G/DL (ref 2–3.5)
GLUCOSE BLD-MCNC: 85 MG/DL (ref 70–99)
HCT VFR BLD AUTO: 33.5 %
HGB BLD-MCNC: 11.3 G/DL
IMM GRANULOCYTES # BLD AUTO: 0.05 X10(3) UL (ref 0–1)
IMM GRANULOCYTES NFR BLD: 1.2 %
LYMPHOCYTES # BLD AUTO: 0.99 X10(3) UL (ref 1–4)
LYMPHOCYTES NFR BLD AUTO: 24 %
MCH RBC QN AUTO: 30.2 PG (ref 26–34)
MCHC RBC AUTO-ENTMCNC: 33.7 G/DL (ref 31–37)
MCV RBC AUTO: 89.6 FL
MONOCYTES # BLD AUTO: 0.41 X10(3) UL (ref 0.1–1)
MONOCYTES NFR BLD AUTO: 10 %
NEUTROPHILS # BLD AUTO: 2.59 X10 (3) UL (ref 1.5–7.7)
NEUTROPHILS # BLD AUTO: 2.59 X10(3) UL (ref 1.5–7.7)
NEUTROPHILS NFR BLD AUTO: 62.8 %
OSMOLALITY SERPL CALC.SUM OF ELEC: 288 MOSM/KG (ref 275–295)
PLATELET # BLD AUTO: 189 10(3)UL (ref 150–450)
POTASSIUM SERPL-SCNC: 3.9 MMOL/L (ref 3.5–5.1)
PROT SERPL-MCNC: 6.7 G/DL (ref 5.7–8.2)
RBC # BLD AUTO: 3.74 X10(6)UL
SODIUM SERPL-SCNC: 139 MMOL/L (ref 136–145)
T4 FREE SERPL-MCNC: 1.5 NG/DL (ref 0.8–1.7)
TSI SER-ACNC: 0.73 UIU/ML (ref 0.55–4.78)
WBC # BLD AUTO: 4.1 X10(3) UL (ref 4–11)

## 2024-12-26 RX ORDER — PALONOSETRON 0.05 MG/ML
0.25 INJECTION, SOLUTION INTRAVENOUS ONCE
Status: COMPLETED | OUTPATIENT
Start: 2024-12-26 | End: 2024-12-26

## 2024-12-26 RX ORDER — FAMOTIDINE 10 MG/ML
20 INJECTION, SOLUTION INTRAVENOUS ONCE
Status: COMPLETED | OUTPATIENT
Start: 2024-12-26 | End: 2024-12-26

## 2024-12-26 RX ORDER — FAMOTIDINE 10 MG/ML
20 INJECTION, SOLUTION INTRAVENOUS ONCE
Status: CANCELLED | OUTPATIENT
Start: 2024-12-27

## 2024-12-26 RX ORDER — DIPHENHYDRAMINE HYDROCHLORIDE 50 MG/ML
50 INJECTION INTRAMUSCULAR; INTRAVENOUS ONCE
Status: COMPLETED | OUTPATIENT
Start: 2024-12-26 | End: 2024-12-26

## 2024-12-26 RX ORDER — DIPHENHYDRAMINE HYDROCHLORIDE 50 MG/ML
50 INJECTION INTRAMUSCULAR; INTRAVENOUS ONCE
Status: CANCELLED | OUTPATIENT
Start: 2024-12-27

## 2024-12-26 RX ORDER — PALONOSETRON 0.05 MG/ML
0.25 INJECTION, SOLUTION INTRAVENOUS ONCE
Status: CANCELLED
Start: 2024-12-27 | End: 2024-12-27

## 2024-12-26 RX ADMIN — PALONOSETRON 0.25 MG: 0.05 INJECTION, SOLUTION INTRAVENOUS at 13:03:00

## 2024-12-26 RX ADMIN — FAMOTIDINE 20 MG: 10 INJECTION, SOLUTION INTRAVENOUS at 13:07:00

## 2024-12-26 RX ADMIN — DIPHENHYDRAMINE HYDROCHLORIDE 50 MG: 50 INJECTION INTRAMUSCULAR; INTRAVENOUS at 13:05:00

## 2024-12-26 NOTE — PROGRESS NOTES
Cancer Center Progress Note    Patient Name: Moinca Holguin   YOB: 1960   Medical Record Number: [id number]   Date of visit: 12/26/2024    Chief Complaint/Reason for Visit:  Chief Complaint   Patient presents with    Follow - Up      History of Present Illness: Monica presents today for follow up of endometrial cancer. She started adjuvant chemotherapy and immunotherapy (carboplatin, paclitaxel and pembrolizumab) on 10/24/2024, today is cycle 4. Her medical oncologist is Dr. Celio Trujillo, additional oncology history below.    Patient presented to urgent care on 12/16/2024 for upper respiratory symptoms, she started Augmentin, Flonase, and Tessalon. She denies any fevers. She reports cough is improved. She denies any new apparent side effects from chemo.     Oncology History:    Initially diagnosed with anal cancer in 2013 when she complained of rectal bleeding and inability to use a tampon during her menstrual periods.  She was seen by gynecology, who detected a vaginal mass and referred her to GI for colonoscopy and biopsy.  She also had severe Fe deficiency anemia, at the time, with a hgb around 4.  She was treated with chemoRT by Dr. Berumen at [facility] in [place].  She recalls Mitomycin C and 5FU as the chemo agents. She had a complete response.  She transferred her care to [facility], due to an insurance change, and had been following there with q6 month exams and annual CT.  She had been told that there has been no evidence of recurrence.  Her insurance has mandated a change, again, and she has been referred here for ongoing care.  She had also seen Dr. Ayon, Surgery, who has planned an exam under anesthesthesia to document the absence of recurrence. Repeat C-scope in Nov, 2017 revealed no evidence of disease.        She saw gynecology for an exam on 8/9/17.         She was seen in the ER on 8/23/24 complaining of abdominal pain. CT revealed a large endometrial mass and prominent  but not enlarged retroperitoneal nodes. She was referred back for evaluation. She was referred to gyn onc and saw Dr. Valdivia.     On 9/26/24 she underwent an ex-lap, MONSE/BSO, periaortic lymph node dissection at [facility].   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 Tumor deposits were seen in the lymphatics. Tumor cells were positive for p16. The tumor involved the cervix and 3 of 5 periaortic nodes were positive for metastatic carcinoma. MMR was intact.  Stage IIIc2. Dr. Valdivia recommended 6 cycles of adjuvant Carbo/Taxol with a checkpoint inhibitor. This would possibly be followed by periaortic radiation.     Problem List:  Patient Active Problem List   Diagnosis    Hypothyroidism (acquired)    Class 1 obesity with serious comorbidity and body mass index (BMI) of 33.0 to 33.9 in adult    Restless leg syndrome    Cervical radiculopathy    Hypercalcemia    Hyperlipidemia    History of anal cancer    S/P parathyroidectomy    Endometrial cancer, FIGO stage IIIC2 (HCC)    Primary hyperparathyroidism (HCC)    Asthma with COPD (chronic obstructive pulmonary disease) (HCC)    Abnormal finding on GI tract imaging        Medical History:  Past Medical History:    Anal cancer (HCC)    Anal cancer (HCC)    Anesthesia complication    slow to arouse    Anxiety state    Asthma (HCC)    excerise induced    Back problem    upper back pain from radiation    Bipolar disorder (HCC)    Bloating    Blood disorder    anemia resolved    Constipation    Years    Depression    Disorder of thyroid    Exposure to radiation    last dose February 2014    Fatigue    Fibromyalgia    Frequent urination    Headache disorder    Neck problems    History of blood transfusion    History of depression    Hx of motion sickness    Hypothyroidism (acquired)    Dx in 8/2017    Irregular bowel habits    Leaking of urine    After surgery    Migraines    in the past    Nausea    Gallbladder attacks    Neoplasm, breast     Pain in joints    Chemo    Personal history of antineoplastic chemotherapy     last dose 2014, patient also completed radiation therapy     PONV (postoperative nausea and vomiting)    Presence of other cardiac implants and grafts    Left ankle    Restless leg syndrome    Restless leg syndrome    Sleep disturbance    Stress    Uncomfortable fullness after meals    Visual impairment    glasses,contacts    Wears glasses       Surgical History:  Past Surgical History:   Procedure Laterality Date          Colonoscopy N/A 11/10/2017    Procedure: COLONOSCOPY;  Surgeon: Ayan Ayon DO;  Location:  ENDOSCOPY    Colonoscopy N/A 2018    Procedure: COLONOSCOPY, POSSIBLE BIOPSY, POSSIBLE POLYPECTOMY 99939;  Surgeon: Ayan Ayon DO;  Location: [facility]    Hysterectomy      Estrada biopsy stereo nodule 1 site right (cpt=19081)      benign     Estrada biopsy stereo nodule 2 site bilat (cpt=19081/64915)      benign     Estrada localization wire 1 site left (cpt=19281)      Estrada localization wire 1 site right (cpt=19281)      Other      left knee surgery- allograft    Other      left ankle fracture     Other  2017    Anal exam under anesthesia    Other surgical history  10/2020    Port removal     Port, indwelling, imp      Sigmoidoscopy,diagnostic      Tonsillectomy  1963    Total abdom hysterectomy  24       Allergies:  Allergies[1]    Family History:  Family History   Problem Relation Age of Onset    Cancer Father         lung    Other (anuyrsem) Mother     Thyroid disease Sister         Hypothyroidism on [place] thyroid    Cancer Self        Social History:  Social History     Socioeconomic History    Marital status:      Spouse name: Not on file    Number of children: Not on file    Years of education: Not on file    Highest education level: Not on file   Occupational History    Not on file   Tobacco Use    Smoking status: Never    Smokeless tobacco: Never    Tobacco comments:      non-smoker   Vaping Use    Vaping status: Never Used   Substance and Sexual Activity    Alcohol use: Yes     Comment: rare    Drug use: No    Sexual activity: Not Currently   Other Topics Concern     Service Not Asked    Blood Transfusions Not Asked    Caffeine Concern Yes     Comment: 1 cup coffee per day    Occupational Exposure Not Asked    Hobby Hazards Not Asked    Sleep Concern Not Asked    Stress Concern Not Asked    Weight Concern Not Asked    Special Diet Not Asked    Back Care Not Asked    Exercise Yes     Comment: walking    Bike Helmet Not Asked    Seat Belt Yes    Self-Exams Not Asked   Social History Narrative    Not on file     Social Drivers of Health     Financial Resource Strain: Not on file   Food Insecurity: Not on file   Transportation Needs: Not on file   Physical Activity: Not on file   Stress: Not on file   Social Connections: Not on file   Housing Stability: Not on file       Medications:    Current Outpatient Medications:     benzonatate 100 MG Oral Cap, Take 1 capsule (100 mg total) by mouth 3 (three) times daily as needed for cough., Disp: 30 capsule, Rfl: 0    fluticasone propionate 50 MCG/ACT Nasal Suspension, 2 sprays by Nasal route daily., Disp: 16 g, Rfl: 0    Wheat Dextrin (BENEFIBER OR), Take by mouth., Disp: , Rfl:     pantoprazole 40 MG Oral Tab EC, TAKE 1 TABLET(40 MG) BY MOUTH DAILY 30 MINUTES BEFORE BREAKFAST, Disp: 90 tablet, Rfl: 0    metoclopramide 10 MG Oral Tab, Take 1 tablet (10 mg total) by mouth 4 (four) times daily as needed (nausea)., Disp: 60 tablet, Rfl: 3    ondansetron 8 MG Oral Tablet Dispersible, Take 1 tablet (8 mg total) by mouth every 8 (eight) hours as needed for Nausea., Disp: 30 tablet, Rfl: 3    lidocaine-prilocaine 2.5-2.5 % External Cream, Apply to site 1 hour prior to port a cath needle insertion, Disp: 1 each, Rfl: 0    levothyroxine 100 MCG Oral Tab, Take 1 tablet (100 mcg total) by mouth before breakfast., Disp: 90 tablet, Rfl: 0     Gabapentin Enacarbil ER (HORIZANT) 300 MG Oral Tab CR, Take 100 mg by mouth daily., Disp: , Rfl:     Review of Systems:  A comprehensive 14 point review of systems was completed.  Pertinent positives and negatives noted in the HPI.    Performance Status: ECOG 1 - No physically strenuous activity, but ambulatory and able to carry out light or sedentary work (e.g. office work, light house work).    Physical Examination:  General: Patient is alert and oriented x 3, not in acute distress.  Vital Signs: Height: 160 cm (5' 2.99\") (12/26 1052)  Weight: 86.6 kg (191 lb) (12/26 1052)  BSA (Calculated - sq m): 1.9 sq meters (12/26 1052)  Pulse: 82 (12/26 1052)  BP: 122/87 (12/26 1052)  Temp: 98 °F (36.7 °C) (12/26 1052)  Do Not Use - Resp Rate: --  SpO2: 98 % (12/26 1052)  HEENT: Anicteric, conjunctivae and sclerae clear, no oropharyngeal lesion/thrush, mucous membranes are moist   Chest: Clear to auscultation. Respirations unlabored.   Heart: Regular rate and rhythm.   Abdomen: Soft, non-distended, non-tender with present bowel sounds.  Extremities: No edema.  Neurological: Grossly intact.   Lymphatics: There is no palpable lymphadenopathy throughout in the cervical or supraclavicular regions.   Skin: no apparent rashes or lesions  Psych/Depression: mood and affect are appropriate.     Labs:      Recent Results (from the past 72 hours)   CBC W Differential W Platelet    Collection Time: 12/26/24 10:44 AM   Result Value Ref Range    WBC 4.1 4.0 - 11.0 x10(3) uL    RBC 3.74 (L) 3.80 - 5.30 x10(6)uL    HGB 11.3 (L) 12.0 - 16.0 g/dL    HCT 33.5 (L) 35.0 - 48.0 %    .0 150.0 - 450.0 10(3)uL    MCV 89.6 80.0 - 100.0 fL    MCH 30.2 26.0 - 34.0 pg    MCHC 33.7 31.0 - 37.0 g/dL    RDW 16.2 %    Neutrophil Absolute Prelim 2.59 1.50 - 7.70 x10 (3) uL    Neutrophil Absolute 2.59 1.50 - 7.70 x10(3) uL    Lymphocyte Absolute 0.99 (L) 1.00 - 4.00 x10(3) uL    Monocyte Absolute 0.41 0.10 - 1.00 x10(3) uL    Eosinophil Absolute 0.06  0.00 - 0.70 x10(3) uL    Basophil Absolute 0.02 0.00 - 0.20 x10(3) uL    Immature Granulocyte Absolute 0.05 0.00 - 1.00 x10(3) uL    Neutrophil % 62.8 %    Lymphocyte % 24.0 %    Monocyte % 10.0 %    Eosinophil % 1.5 %    Basophil % 0.5 %    Immature Granulocyte % 1.2 %   Comp Metabolic Panel (14)    Collection Time: 12/26/24 10:44 AM   Result Value Ref Range    Glucose 85 70 - 99 mg/dL    Sodium 139 136 - 145 mmol/L    Potassium 3.9 3.5 - 5.1 mmol/L    Chloride 106 98 - 112 mmol/L    CO2 27.0 21.0 - 32.0 mmol/L    Anion Gap 6 0 - 18 mmol/L    BUN 15 9 - 23 mg/dL    Creatinine 0.85 0.55 - 1.02 mg/dL    Calcium, Total 10.5 (H) 8.7 - 10.4 mg/dL    Calculated Osmolality 288 275 - 295 mOsm/kg    eGFR-Cr 76 >=60 mL/min/1.73m2    AST 19 <34 U/L    ALT 30 10 - 49 U/L    Alkaline Phosphatase 91 50 - 130 U/L    Bilirubin, Total 0.4 0.2 - 1.1 mg/dL    Total Protein 6.7 5.7 - 8.2 g/dL    Albumin 4.5 3.2 - 4.8 g/dL    Globulin  2.2 2.0 - 3.5 g/dL    A/G Ratio 2.0 1.0 - 2.0    Patient Fasting for CMP? Patient not present        Impression/Plan    Endometrial cancer: s/p MONSE/BSO on 9/26/24, pathology was positive for  started adjuvant chemotherapy and immunotherapy (carboplatin, paclitaxel and pembrolizumab) on 10/24/2024. She has been tolerating with mild expected side effects. Labs reviewed, proceed with cycle 4 today. Plan for 6 cycles and then refer to radiation oncology for possible RT to periaortic nodes. She is limited for RT due to history of RT for anal cancer.     Planned Follow Up: 3 weeks follow up with APN, labs and chemo; 6 weeks follow up with Dr. Trujillo, labs and chemo    Risk Level: HIGH endometrial cancer receiving chemotherapy and immunotherapy requiring close monitoring     The 21st Century Cures Act makes medical notes like these available to patients in the interest of transparency. Please be advised this is a medical document. Medical documents are intended to carry relevant information, facts as  evident, and the clinical opinion of the practitioner. The medical note is intended as peer to peer communication and may appear blunt or direct. It is written in medical language and may contain abbreviations or verbiage that are unfamiliar.     Electronically Signed by:    Valeria Christina, USMAN, APRN, NP-C, AOCNP  Nurse Practitioner  [facility]         [1]   Allergies  Allergen Reactions    Chlorhexidine HIVES    Citalopram HIVES    Etomidate ANAPHYLAXIS    Gabapentin OTHER (SEE COMMENTS)     swelling    Lexapro [Escitalopram] SWELLING     Legs and ankles    Mold WHEEZING    Pramipexole SWELLING    Pregabalin SWELLING    Quetiapine Fumarate HIVES, ITCHING and INSOMNIA    Dust OTHER (SEE COMMENTS)     Sneezing, sinus infection    Ropinirole OTHER (SEE COMMENTS)     Restless legs    Adhesive Tape RASH     Need to use surgery glue    Promethazine NAUSEA ONLY

## 2024-12-26 NOTE — PROGRESS NOTES
Pt here for C4 D1 Drug(s): Taxol/Carboplatin.  Arrives Ambulating independently, accompanied by Self     Patient was evaluated today by MADIHA and Treatment Nurse.    Oral medications included in this regimen:  no    Patient confirms comprehension of cancer treatment schedule:  yes    Pregnancy screening:  Denies possibility of pregnancy    Modifications in dose or schedule:  No    Medications appearance and physical integrity checked by RN: yes.    Chemotherapy IV pump settings verified by 2 RNs:  Yes.  Frequency of blood return and site check throughout administration: Prior to administration and At completion of therapy     Infusion/treatment outcome:  patient tolerated treatment without incident    Education Record    Learner:  Patient  Barriers / Limitations:  None  Method:  Discussion  Education / instructions given:  Reviewed plan of care and follow up appts  Outcome:  Shows understanding    Discharged Home, Ambulating independently, accompanied by:Self    Patient/family verbalized understanding of future appointments: by MyChart messaging

## 2024-12-26 NOTE — PROGRESS NOTES
Patient tolerated rest of treatment. States she will get her appts from "[employer]t.  here to pick her up. Discharged in stable condition

## 2025-01-16 ENCOUNTER — OFFICE VISIT (OUTPATIENT)
Age: 65
End: 2025-01-16
Attending: INTERNAL MEDICINE
Payer: MEDICARE

## 2025-01-16 VITALS
SYSTOLIC BLOOD PRESSURE: 118 MMHG | OXYGEN SATURATION: 97 % | HEIGHT: 62.99 IN | TEMPERATURE: 97 F | WEIGHT: 194 LBS | RESPIRATION RATE: 16 BRPM | BODY MASS INDEX: 34.37 KG/M2 | HEART RATE: 87 BPM | DIASTOLIC BLOOD PRESSURE: 79 MMHG

## 2025-01-16 DIAGNOSIS — C54.1 ENDOMETRIAL CANCER, FIGO STAGE IIIC2 (HCC): Primary | ICD-10-CM

## 2025-01-16 DIAGNOSIS — R10.13 DYSPEPSIA: ICD-10-CM

## 2025-01-16 LAB
ALBUMIN SERPL-MCNC: 4.9 G/DL (ref 3.2–4.8)
ALBUMIN/GLOB SERPL: 2 {RATIO} (ref 1–2)
ALP LIVER SERPL-CCNC: 82 U/L
ALT SERPL-CCNC: 38 U/L
ANION GAP SERPL CALC-SCNC: 7 MMOL/L (ref 0–18)
AST SERPL-CCNC: 25 U/L (ref ?–34)
BASOPHILS # BLD AUTO: 0.03 X10(3) UL (ref 0–0.2)
BASOPHILS NFR BLD AUTO: 0.9 %
BILIRUB SERPL-MCNC: 0.5 MG/DL (ref 0.2–1.1)
BUN BLD-MCNC: 17 MG/DL (ref 9–23)
BUN BLD-MCNC: 18 MG/DL (ref 7–18)
CALCIUM BLD-MCNC: 11.2 MG/DL (ref 8.7–10.6)
CHLORIDE BLD-SCNC: 104 MMOL/L (ref 98–112)
CHLORIDE SERPL-SCNC: 104 MMOL/L (ref 98–112)
CO2 BLD-SCNC: 26 MMOL/L (ref 21–32)
CO2 SERPL-SCNC: 28 MMOL/L (ref 21–32)
CREAT BLD-MCNC: 0.7 MG/DL
CREAT BLD-MCNC: 0.73 MG/DL
EGFRCR SERPLBLD CKD-EPI 2021: 91 ML/MIN/1.73M2 (ref 60–?)
EGFRCR SERPLBLD CKD-EPI 2021: 96 ML/MIN/1.73M2 (ref 60–?)
EOSINOPHIL # BLD AUTO: 0.05 X10(3) UL (ref 0–0.7)
EOSINOPHIL NFR BLD AUTO: 1.5 %
ERYTHROCYTE [DISTWIDTH] IN BLOOD BY AUTOMATED COUNT: 16.9 %
GLOBULIN PLAS-MCNC: 2.4 G/DL (ref 2–3.5)
GLUCOSE BLD-MCNC: 100 MG/DL (ref 70–99)
GLUCOSE BLD-MCNC: 120 MG/DL (ref 70–99)
HCT VFR BLD AUTO: 36.1 %
HCT VFR BLD CALC: 33 %
HGB BLD-MCNC: 12.2 G/DL
IMM GRANULOCYTES # BLD AUTO: 0.04 X10(3) UL (ref 0–1)
IMM GRANULOCYTES NFR BLD: 1.2 %
ISTAT IONIZED CALCIUM FOR CHEM 8: 1.38 MMOL/L (ref 1.12–1.32)
LYMPHOCYTES # BLD AUTO: 0.91 X10(3) UL (ref 1–4)
LYMPHOCYTES NFR BLD AUTO: 27 %
MCH RBC QN AUTO: 31 PG (ref 26–34)
MCHC RBC AUTO-ENTMCNC: 33.8 G/DL (ref 31–37)
MCV RBC AUTO: 91.9 FL
MONOCYTES # BLD AUTO: 0.26 X10(3) UL (ref 0.1–1)
MONOCYTES NFR BLD AUTO: 7.7 %
NEUTROPHILS # BLD AUTO: 2.08 X10 (3) UL (ref 1.5–7.7)
NEUTROPHILS # BLD AUTO: 2.08 X10(3) UL (ref 1.5–7.7)
NEUTROPHILS NFR BLD AUTO: 61.7 %
OSMOLALITY SERPL CALC.SUM OF ELEC: 291 MOSM/KG (ref 275–295)
PLATELET # BLD AUTO: 202 10(3)UL (ref 150–450)
POTASSIUM BLD-SCNC: 3.8 MMOL/L (ref 3.6–5.1)
POTASSIUM SERPL-SCNC: 3.8 MMOL/L (ref 3.5–5.1)
PROT SERPL-MCNC: 7.3 G/DL (ref 5.7–8.2)
RBC # BLD AUTO: 3.93 X10(6)UL
SODIUM BLD-SCNC: 139 MMOL/L (ref 136–145)
SODIUM SERPL-SCNC: 139 MMOL/L (ref 136–145)
T4 FREE SERPL-MCNC: 1.6 NG/DL (ref 0.8–1.7)
TSI SER-ACNC: 0.27 UIU/ML (ref 0.55–4.78)
WBC # BLD AUTO: 3.4 X10(3) UL (ref 4–11)

## 2025-01-16 RX ORDER — DIPHENHYDRAMINE HYDROCHLORIDE 50 MG/ML
50 INJECTION INTRAMUSCULAR; INTRAVENOUS ONCE
Status: CANCELLED | OUTPATIENT
Start: 2025-01-16

## 2025-01-16 RX ORDER — FAMOTIDINE 10 MG/ML
20 INJECTION, SOLUTION INTRAVENOUS ONCE
Status: CANCELLED | OUTPATIENT
Start: 2025-01-16

## 2025-01-16 RX ORDER — FAMOTIDINE 10 MG/ML
20 INJECTION, SOLUTION INTRAVENOUS ONCE
Status: COMPLETED | OUTPATIENT
Start: 2025-01-16 | End: 2025-01-16

## 2025-01-16 RX ORDER — DIPHENHYDRAMINE HYDROCHLORIDE 50 MG/ML
50 INJECTION INTRAMUSCULAR; INTRAVENOUS ONCE
Status: COMPLETED | OUTPATIENT
Start: 2025-01-16 | End: 2025-01-16

## 2025-01-16 RX ORDER — PANTOPRAZOLE SODIUM 40 MG/1
40 TABLET, DELAYED RELEASE ORAL
Qty: 60 TABLET | Refills: 0 | Status: SHIPPED | OUTPATIENT
Start: 2025-01-16 | End: 2025-01-17

## 2025-01-16 RX ORDER — PALONOSETRON 0.05 MG/ML
0.25 INJECTION, SOLUTION INTRAVENOUS ONCE
Status: CANCELLED
Start: 2025-01-16 | End: 2025-01-16

## 2025-01-16 RX ORDER — PALONOSETRON 0.05 MG/ML
0.25 INJECTION, SOLUTION INTRAVENOUS ONCE
Status: COMPLETED | OUTPATIENT
Start: 2025-01-16 | End: 2025-01-16

## 2025-01-16 RX ADMIN — PALONOSETRON 0.25 MG: 0.05 INJECTION, SOLUTION INTRAVENOUS at 12:16:00

## 2025-01-16 RX ADMIN — FAMOTIDINE 20 MG: 10 INJECTION, SOLUTION INTRAVENOUS at 12:21:00

## 2025-01-16 RX ADMIN — DIPHENHYDRAMINE HYDROCHLORIDE 50 MG: 50 INJECTION INTRAMUSCULAR; INTRAVENOUS at 12:18:00

## 2025-01-16 NOTE — PROGRESS NOTES
Outpatient Oncology Care Plan  Problem list:  pain  fatigue  nausea and vomiting  peripheral neuropathy    Problems related to:    chemotherapy  disease/disease progression  side effect of treatment    Interventions:  provided general teaching    Expected outcomes:  understands plan of care    Progress towards outcome:  making progress    Education Record    Learner:  Patient  Barriers / Limitations:  None  Method:  Reinforcement  Outcome:  Shows understanding  Comments:    Patient here for C5D1 Keytruda/Taxol/Carbo. She states that she has had some recent RUQ pain/sore/tenderness where port-a-cath is. Denies bruising/bleeding/edema/trauma to the site. She has c/p RUQ gallbladder pain that radiates to her back. She feels this especially at night when lying down. She also c/o fatigue, neuropathy in her feet, occasional nausea, and occasional SOB w/ exertion.

## 2025-01-16 NOTE — PROGRESS NOTES
Pt here for C5D1 Drug(s)Keytruda, taxol, carbo.  Arrives Ambulating independently, accompanied by Self     Patient was evaluated today by MADIHA.    Oral medications included in this regimen:  no    Patient confirms comprehension of cancer treatment schedule:  yes    Pregnancy screening:  Denies possibility of pregnancy    Modifications in dose or schedule:  No    Medications appearance and physical integrity checked by RN: yes.    Chemotherapy IV pump settings verified by 2 RNs:  Yes.  Frequency of blood return and site check throughout administration: Prior to administration     Infusion/treatment outcome:  patient tolerated treatment without incident    Education Record    Learner:  Patient  Barriers / Limitations:  None  Method:  Discussion  Education / instructions given:  POC  Outcome:  Shows understanding    Discharged Home, Ambulating independently, accompanied by:Self    Patient/family verbalized understanding of future appointments: by [employer] messaging    Patient tolerated treatment well. Left in stable condition. No blood return from port at start of visit. Alteplase administered with good results. Appt in place for next visit.

## 2025-01-16 NOTE — PROGRESS NOTES
Cancer Center Progress Note    Patient Name: Monica Holguin   YOB: 1960   Medical Record Number: [id number]   Date of visit: 1/16/2025    Chief Complaint/Reason for Visit:  Chief Complaint   Patient presents with    Cancer    Follow - Up    Chemotherapy      History of Present Illness: Monica presents today for follow up of endometrial cancer. She started adjuvant chemotherapy and immunotherapy (carboplatin, paclitaxel and pembrolizumab) on 10/24/2024, today is cycle 5. Her medical oncologist is Dr. Celio Trujillo, additional oncology history below.    Today, patient reports she noticed right upper quadrant abdominal discomfort for several days. She noticed this at night when laying down. She also has fatigue. She reports peripheral neuropathy in feet improved. She has occasional nausea that is controlled with antiemetics.      Oncology History:    Initially diagnosed with anal cancer in 2013 when she complained of rectal bleeding and inability to use a tampon during her menstrual periods.  She was seen by gynecology, who detected a vaginal mass and referred her to GI for colonoscopy and biopsy.  She also had severe Fe deficiency anemia, at the time, with a hgb around 4.  She was treated with chemoRT by Dr. Berumen at [facility] in [place].  She recalls Mitomycin C and 5FU as the chemo agents. She had a complete response.  She transferred her care to [facility], due to an insurance change, and had been following there with q6 month exams and annual CT.  She had been told that there has been no evidence of recurrence.  Her insurance has mandated a change, again, and she has been referred here for ongoing care.  She had also seen Dr. Ayon, Surgery, who has planned an exam under anesthesthesia to document the absence of recurrence. Repeat C-scope in Nov, 2017 revealed no evidence of disease.        She saw gynecology for an exam on 8/9/17.         She was seen in the ER on 8/23/24 complaining  of abdominal pain. CT revealed a large endometrial mass and prominent but not enlarged retroperitoneal nodes. She was referred back for evaluation. She was referred to gyn onc and saw Dr. Valdivia.     On 9/26/24 she underwent an ex-lap, MONSE/BSO, periaortic lymph node dissection at [facility].   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 Tumor deposits were seen in the lymphatics. Tumor cells were positive for p16. The tumor involved the cervix and 3 of 5 periaortic nodes were positive for metastatic carcinoma. MMR was intact.  Stage IIIc2. Dr. Valdivia recommended 6 cycles of adjuvant Carbo/Taxol with a checkpoint inhibitor. This would possibly be followed by periaortic radiation.     Problem List:  Patient Active Problem List   Diagnosis    Hypothyroidism (acquired)    Class 1 obesity with serious comorbidity and body mass index (BMI) of 33.0 to 33.9 in adult    Restless leg syndrome    Cervical radiculopathy    Hypercalcemia    Hyperlipidemia    History of anal cancer    S/P parathyroidectomy    Endometrial cancer, FIGO stage IIIC2 (HCC)    Primary hyperparathyroidism (HCC)    Asthma with COPD (chronic obstructive pulmonary disease) (HCC)    Abnormal finding on GI tract imaging        Medical History:  Past Medical History:    Anal cancer (HCC)    Anal cancer (HCC)    Anesthesia complication    slow to arouse    Anxiety state    Asthma (HCC)    excerise induced    Back problem    upper back pain from radiation    Bipolar disorder (HCC)    Bloating    Blood disorder    anemia resolved    Constipation    Years    Depression    Disorder of thyroid    Exposure to radiation    last dose February 2014    Fatigue    Fibromyalgia    Frequent urination    Headache disorder    Neck problems    History of blood transfusion    History of depression    Hx of motion sickness    Hypothyroidism (acquired)    Dx in 8/2017    Irregular bowel habits    Leaking of urine    After surgery    Migraines     in the past    Nausea    Gallbladder attacks    Neoplasm, breast    Pain in joints    Chemo    Personal history of antineoplastic chemotherapy     last dose 2014, patient also completed radiation therapy     PONV (postoperative nausea and vomiting)    Presence of other cardiac implants and grafts    Left ankle    Restless leg syndrome    Restless leg syndrome    Sleep disturbance    Stress    Uncomfortable fullness after meals    Visual impairment    glasses,contacts    Wears glasses       Surgical History:  Past Surgical History:   Procedure Laterality Date          Colonoscopy N/A 11/10/2017    Procedure: COLONOSCOPY;  Surgeon: Ayan Ayon DO;  Location:  ENDOSCOPY    Colonoscopy N/A 2018    Procedure: COLONOSCOPY, POSSIBLE BIOPSY, POSSIBLE POLYPECTOMY 66750;  Surgeon: Ayan Ayon DO;  Location: [facility]    Hysterectomy      Estrada biopsy stereo nodule 1 site right (cpt=19081)      benign     Estrada biopsy stereo nodule 2 site bilat (cpt=19081/06938)      benign     Estrada localization wire 1 site left (cpt=19281)      Estrada localization wire 1 site right (cpt=19281)      Other      left knee surgery- allograft    Other      left ankle fracture     Other  2017    Anal exam under anesthesia    Other surgical history  10/2020    Port removal     Port, indwelling, imp      Sigmoidoscopy,diagnostic      Tonsillectomy  1963    Total abdom hysterectomy  24       Allergies:  Allergies[1]    Family History:  Family History   Problem Relation Age of Onset    Cancer Father         lung    Other (anuyrsem) Mother     Thyroid disease Sister         Hypothyroidism on [place] thyroid    Cancer Self        Social History:  Social History     Socioeconomic History    Marital status:      Spouse name: Not on file    Number of children: Not on file    Years of education: Not on file    Highest education level: Not on file   Occupational History    Not on file   Tobacco Use    Smoking  status: Never    Smokeless tobacco: Never    Tobacco comments:     non-smoker   Vaping Use    Vaping status: Never Used   Substance and Sexual Activity    Alcohol use: Yes     Comment: rare    Drug use: No    Sexual activity: Not Currently   Other Topics Concern     Service Not Asked    Blood Transfusions Not Asked    Caffeine Concern Yes     Comment: 1 cup coffee per day    Occupational Exposure Not Asked    Hobby Hazards Not Asked    Sleep Concern Not Asked    Stress Concern Not Asked    Weight Concern Not Asked    Special Diet Not Asked    Back Care Not Asked    Exercise Yes     Comment: walking    Bike Helmet Not Asked    Seat Belt Yes    Self-Exams Not Asked   Social History Narrative    Not on file     Social Drivers of Health     Financial Resource Strain: Patient Declined (1/1/2025)    Received from [facility]    Overall Financial Resource Strain (CARDIA)     Difficulty of Paying Living Expenses: Patient declined   Food Insecurity: Patient Declined (1/1/2025)    Received from [facility]    Hunger Vital Sign     Worried About Running Out of Food in the Last Year: Patient declined     Ran Out of Food in the Last Year: Patient declined   Transportation Needs: Patient Declined (1/1/2025)    Received from [facility]    PRAPARE - Transportation     Lack of Transportation (Medical): Patient declined     Lack of Transportation (Non-Medical): Patient declined   Physical Activity: Not on file   Stress: Not on file   Social Connections: Not on file   Housing Stability: Unknown (1/1/2025)    Received from [facility]    Housing Stability Vital Sign     Unable to Pay for Housing in the Last Year: Patient declined     Number of Times Moved in the Last Year: Not on file     Homeless in the Last Year: Not on file       Medications:    Current Outpatient Medications:     Wheat Dextrin (BENEFIBER OR), Take by mouth., Disp: , Rfl:      pantoprazole 40 MG Oral Tab EC, TAKE 1 TABLET(40 MG) BY MOUTH DAILY 30 MINUTES BEFORE BREAKFAST, Disp: 90 tablet, Rfl: 0    metoclopramide 10 MG Oral Tab, Take 1 tablet (10 mg total) by mouth 4 (four) times daily as needed (nausea)., Disp: 60 tablet, Rfl: 3    ondansetron 8 MG Oral Tablet Dispersible, Take 1 tablet (8 mg total) by mouth every 8 (eight) hours as needed for Nausea., Disp: 30 tablet, Rfl: 3    lidocaine-prilocaine 2.5-2.5 % External Cream, Apply to site 1 hour prior to port a cath needle insertion, Disp: 1 each, Rfl: 0    levothyroxine 100 MCG Oral Tab, Take 1 tablet (100 mcg total) by mouth before breakfast., Disp: 90 tablet, Rfl: 0    Gabapentin Enacarbil ER (HORIZANT) 300 MG Oral Tab CR, Take 100 mg by mouth daily., Disp: , Rfl:     Review of Systems:  A comprehensive 14 point review of systems was completed.  Pertinent positives and negatives noted in the HPI.    Performance Status: ECOG 1 - No physically strenuous activity, but ambulatory and able to carry out light or sedentary work (e.g. office work, light house work).    Physical Examination:  General: Patient is alert and oriented x 3, not in acute distress.  Vital Signs: Height: 160 cm (5' 2.99\") (01/16 1044)  Weight: 88 kg (194 lb) (01/16 1044)  BSA (Calculated - sq m): 1.91 sq meters (01/16 1044)  Pulse: 87 (01/16 1044)  BP: 118/79 (01/16 1044)  Temp: 97 °F (36.1 °C) (01/16 1044)  Do Not Use - Resp Rate: --  SpO2: 97 % (01/16 1044)  HEENT: Anicteric, conjunctivae and sclerae clear, no oropharyngeal lesion/thrush, mucous membranes are moist   Chest: Clear to auscultation. Respirations unlabored.   Heart: Regular rate and rhythm.   Abdomen: Soft, non-distended, non-tender with present bowel sounds.  Extremities: No edema.  Neurological: Grossly intact.   Lymphatics: There is no palpable lymphadenopathy throughout in the cervical or supraclavicular regions.   Skin: no apparent rashes or lesions  Psych/Depression: mood and affect are  appropriate.     Labs:      Recent Results (from the past 72 hours)   CBC W Differential W Platelet    Collection Time: 01/16/25 10:18 AM   Result Value Ref Range    WBC 3.4 (L) 4.0 - 11.0 x10(3) uL    RBC 3.93 3.80 - 5.30 x10(6)uL    HGB 12.2 12.0 - 16.0 g/dL    HCT 36.1 35.0 - 48.0 %    .0 150.0 - 450.0 10(3)uL    MCV 91.9 80.0 - 100.0 fL    MCH 31.0 26.0 - 34.0 pg    MCHC 33.8 31.0 - 37.0 g/dL    RDW 16.9 %    Neutrophil Absolute Prelim 2.08 1.50 - 7.70 x10 (3) uL    Neutrophil Absolute 2.08 1.50 - 7.70 x10(3) uL    Lymphocyte Absolute 0.91 (L) 1.00 - 4.00 x10(3) uL    Monocyte Absolute 0.26 0.10 - 1.00 x10(3) uL    Eosinophil Absolute 0.05 0.00 - 0.70 x10(3) uL    Basophil Absolute 0.03 0.00 - 0.20 x10(3) uL    Immature Granulocyte Absolute 0.04 0.00 - 1.00 x10(3) uL    Neutrophil % 61.7 %    Lymphocyte % 27.0 %    Monocyte % 7.7 %    Eosinophil % 1.5 %    Basophil % 0.9 %    Immature Granulocyte % 1.2 %   Comp Metabolic Panel (14)    Collection Time: 01/16/25 10:18 AM   Result Value Ref Range    Glucose 120 (H) 70 - 99 mg/dL    Sodium 139 136 - 145 mmol/L    Potassium 3.8 3.5 - 5.1 mmol/L    Chloride 104 98 - 112 mmol/L    CO2 28.0 21.0 - 32.0 mmol/L    Anion Gap 7 0 - 18 mmol/L    BUN 17 9 - 23 mg/dL    Creatinine 0.73 0.55 - 1.02 mg/dL    Calcium, Total 11.2 (H) 8.7 - 10.6 mg/dL    Calculated Osmolality 291 275 - 295 mOsm/kg    eGFR-Cr 91 >=60 mL/min/1.73m2    AST 25 <34 U/L    ALT 38 10 - 49 U/L    Alkaline Phosphatase 82 50 - 130 U/L    Bilirubin, Total 0.5 0.2 - 1.1 mg/dL    Total Protein 7.3 5.7 - 8.2 g/dL    Albumin 4.9 (H) 3.2 - 4.8 g/dL    Globulin  2.4 2.0 - 3.5 g/dL    A/G Ratio 2.0 1.0 - 2.0    Patient Fasting for CMP? Patient not present    TSH [E]    Collection Time: 01/16/25 10:18 AM   Result Value Ref Range    TSH 0.268 (L) 0.550 - 4.780 uIU/mL   T4 FREE [E]    Collection Time: 01/16/25 10:18 AM   Result Value Ref Range    Free T4 1.6 0.8 - 1.7 ng/dL   POCT ISTAT chem8 cartridge     Collection Time: 01/16/25 12:59 PM   Result Value Ref Range    ISTAT Sodium 139 136 - 145 mmol/L    ISTAT BUN 18 7 - 18 mg/dL    ISTAT Potassium 3.8 3.6 - 5.1 mmol/L    ISTAT Chloride 104 98 - 112 mmol/L    ISTAT Ionized Calcium 1.38 (H) 1.12 - 1.32 mmol/L    ISTAT Hematocrit 33 (L) 34 - 50 %    ISTAT Glucose 100 (H) 70 - 99 mg/dL    ISTAT TCO2 26 21 - 32 mmol/L    ISTAT Sample Type Venous     ISTAT Creatinine 0.70 0.55 - 1.02 mg/dL    eGFR-Cr 96 >=60 mL/min/1.73m2     Impression/Plan    Endometrial cancer: s/p MONSE/BSO on 9/26/24, pathology was positive for  started adjuvant chemotherapy and immunotherapy (carboplatin, paclitaxel and pembrolizumab) on 10/24/2024. She has been tolerating with mild expected side effects. Labs reviewed, proceed with cycle 5 today. Plan for 6 cycles and then refer to radiation oncology for possible RT to periaortic nodes. She is limited for RT due to history of RT for anal cancer.     Dyspepsia: on pantoprazole 40 mg once daily for esophagitis found on EGD in 11/2024. Will have patient increased to pantoprazole 40mg BID for several weeks while completing chemotherapy.     Planned Follow Up: 3 weeks follow up with Dr. Trujillo, labs and chemo    Risk Level: HIGH endometrial cancer receiving chemotherapy and immunotherapy requiring close monitoring     The 21st Century Cures Act makes medical notes like these available to patients in the interest of transparency. Please be advised this is a medical document. Medical documents are intended to carry relevant information, facts as evident, and the clinical opinion of the practitioner. The medical note is intended as peer to peer communication and may appear blunt or direct. It is written in medical language and may contain abbreviations or verbiage that are unfamiliar.     Electronically Signed by:    Valeria Christina, USMAN, APRN, NP-C, AOCNP  Nurse Practitioner  [facility]         [1]   Allergies  Allergen Reactions    Chlorhexidine  HIVES    Citalopram HIVES    Etomidate ANAPHYLAXIS    Gabapentin OTHER (SEE COMMENTS)     swelling    Lexapro [Escitalopram] SWELLING     Legs and ankles    Mold WHEEZING    Pramipexole SWELLING    Pregabalin SWELLING    Quetiapine Fumarate HIVES, ITCHING and INSOMNIA    Dust OTHER (SEE COMMENTS)     Sneezing, sinus infection    Ropinirole OTHER (SEE COMMENTS)     Restless legs    Adhesive Tape RASH     Need to use surgery glue    Promethazine NAUSEA ONLY

## 2025-01-17 ENCOUNTER — TELEPHONE (OUTPATIENT)
Age: 65
End: 2025-01-17

## 2025-01-17 RX ORDER — PANTOPRAZOLE SODIUM 40 MG/1
40 TABLET, DELAYED RELEASE ORAL
Qty: 180 TABLET | Refills: 1 | Status: SHIPPED | OUTPATIENT
Start: 2025-01-17

## 2025-01-29 DIAGNOSIS — E03.9 HYPOTHYROIDISM (ACQUIRED): ICD-10-CM

## 2025-01-29 NOTE — TELEPHONE ENCOUNTER
Thyroid Medication Protocol [id number]/29/2025 12:23 PM   Protocol Details Last TSH value is normal    TSH in past 12 months    In person appointment or virtual visit in the past 12 mos or appointment in next 3 mos    Medication is active on med list        Last TSH 0.268 on 1/16/25  Last office visit 6/4/24  Last refill 10/21/24 90 0 refill  Future Appointments   Date Time Provider Department Center   2/6/2025  9:30 AM LEODAN TX RN1 [facility]   2/6/2025 10:00 AM Celio Trujillo MD [facility]

## 2025-01-30 RX ORDER — LEVOTHYROXINE SODIUM 100 UG/1
100 TABLET ORAL
Qty: 90 TABLET | Refills: 0 | Status: SHIPPED | OUTPATIENT
Start: 2025-01-30

## 2025-02-06 ENCOUNTER — OFFICE VISIT (OUTPATIENT)
Age: 65
End: 2025-02-06
Attending: INTERNAL MEDICINE
Payer: MEDICARE

## 2025-02-06 VITALS
DIASTOLIC BLOOD PRESSURE: 86 MMHG | HEART RATE: 74 BPM | RESPIRATION RATE: 16 BRPM | TEMPERATURE: 97 F | SYSTOLIC BLOOD PRESSURE: 148 MMHG | HEIGHT: 62.99 IN | WEIGHT: 198 LBS | BODY MASS INDEX: 35.08 KG/M2 | OXYGEN SATURATION: 98 %

## 2025-02-06 DIAGNOSIS — C54.1 ENDOMETRIAL CANCER, FIGO STAGE IIIC2 (HCC): Primary | ICD-10-CM

## 2025-02-06 LAB
ALBUMIN SERPL-MCNC: 4.2 G/DL (ref 3.2–4.8)
ALBUMIN/GLOB SERPL: 1.8 {RATIO} (ref 1–2)
ALP LIVER SERPL-CCNC: 77 U/L
ALT SERPL-CCNC: 27 U/L
ANION GAP SERPL CALC-SCNC: 6 MMOL/L (ref 0–18)
AST SERPL-CCNC: 19 U/L (ref ?–34)
BASOPHILS # BLD AUTO: 0.02 X10(3) UL (ref 0–0.2)
BASOPHILS NFR BLD AUTO: 0.7 %
BILIRUB SERPL-MCNC: 0.4 MG/DL (ref 0.2–1.1)
BUN BLD-MCNC: 16 MG/DL (ref 9–23)
CALCIUM BLD-MCNC: 9.9 MG/DL (ref 8.7–10.6)
CHLORIDE SERPL-SCNC: 110 MMOL/L (ref 98–112)
CO2 SERPL-SCNC: 26 MMOL/L (ref 21–32)
CREAT BLD-MCNC: 0.66 MG/DL
EGFRCR SERPLBLD CKD-EPI 2021: 97 ML/MIN/1.73M2 (ref 60–?)
EOSINOPHIL # BLD AUTO: 0.04 X10(3) UL (ref 0–0.7)
EOSINOPHIL NFR BLD AUTO: 1.4 %
ERYTHROCYTE [DISTWIDTH] IN BLOOD BY AUTOMATED COUNT: 16.2 %
GLOBULIN PLAS-MCNC: 2.3 G/DL (ref 2–3.5)
GLUCOSE BLD-MCNC: 93 MG/DL (ref 70–99)
HCT VFR BLD AUTO: 31.7 %
HGB BLD-MCNC: 10.6 G/DL
IMM GRANULOCYTES # BLD AUTO: 0.03 X10(3) UL (ref 0–1)
IMM GRANULOCYTES NFR BLD: 1 %
LYMPHOCYTES # BLD AUTO: 0.9 X10(3) UL (ref 1–4)
LYMPHOCYTES NFR BLD AUTO: 31.5 %
MCH RBC QN AUTO: 31.3 PG (ref 26–34)
MCHC RBC AUTO-ENTMCNC: 33.4 G/DL (ref 31–37)
MCV RBC AUTO: 93.5 FL
MONOCYTES # BLD AUTO: 0.37 X10(3) UL (ref 0.1–1)
MONOCYTES NFR BLD AUTO: 12.9 %
NEUTROPHILS # BLD AUTO: 1.5 X10 (3) UL (ref 1.5–7.7)
NEUTROPHILS # BLD AUTO: 1.5 X10(3) UL (ref 1.5–7.7)
NEUTROPHILS NFR BLD AUTO: 52.5 %
OSMOLALITY SERPL CALC.SUM OF ELEC: 295 MOSM/KG (ref 275–295)
PLATELET # BLD AUTO: 170 10(3)UL (ref 150–450)
POTASSIUM SERPL-SCNC: 4 MMOL/L (ref 3.5–5.1)
PROT SERPL-MCNC: 6.5 G/DL (ref 5.7–8.2)
RBC # BLD AUTO: 3.39 X10(6)UL
SODIUM SERPL-SCNC: 142 MMOL/L (ref 136–145)
T4 FREE SERPL-MCNC: 1.4 NG/DL (ref 0.8–1.7)
TSI SER-ACNC: 0.3 UIU/ML (ref 0.55–4.78)
WBC # BLD AUTO: 2.9 X10(3) UL (ref 4–11)

## 2025-02-06 RX ORDER — DIPHENHYDRAMINE HYDROCHLORIDE 50 MG/ML
50 INJECTION INTRAMUSCULAR; INTRAVENOUS ONCE
Status: COMPLETED | OUTPATIENT
Start: 2025-02-06 | End: 2025-02-06

## 2025-02-06 RX ORDER — PALONOSETRON 0.05 MG/ML
0.25 INJECTION, SOLUTION INTRAVENOUS ONCE
Status: COMPLETED | OUTPATIENT
Start: 2025-02-06 | End: 2025-02-06

## 2025-02-06 RX ORDER — FAMOTIDINE 10 MG/ML
20 INJECTION, SOLUTION INTRAVENOUS ONCE
Status: COMPLETED | OUTPATIENT
Start: 2025-02-06 | End: 2025-02-06

## 2025-02-06 RX ADMIN — FAMOTIDINE 20 MG: 10 INJECTION, SOLUTION INTRAVENOUS at 10:52:00

## 2025-02-06 RX ADMIN — DIPHENHYDRAMINE HYDROCHLORIDE 50 MG: 50 INJECTION INTRAMUSCULAR; INTRAVENOUS at 10:55:00

## 2025-02-06 RX ADMIN — PALONOSETRON 0.25 MG: 0.05 INJECTION, SOLUTION INTRAVENOUS at 10:58:00

## 2025-02-06 NOTE — PROGRESS NOTES
Pt here for C6D1 Drug(s)keytruda/taxol/carbo.  Arrives Ambulating independently, accompanied by self /family    Patient was evaluated today by Treatment RN    Oral medications included in this regimen: see MAR    Patient confirms comprehension of cancer treatment schedule: yes    Pregnancy screening: denies pregnancy    Modifications in dose or schedule: no    Medications appearance and physical integrity checked by RN: yes    Chemotherapy IV pump settings verified by 2 RNs: yes  Frequency of blood return and site check throughout administration: prior to administration and every 2-3 ml if ivp    Infusion/treatment outcome: pt  tolerated treatment with only c/o restless legs    Education Record    Learner: patient  Barriers / Limitations:none  Method: verbal  Education / instructions given:  chemo administration  Outcome: pt verbalized understanding    Discharged home    Patient/family verbalized understanding of future appointments: yes\

## 2025-02-06 NOTE — PROGRESS NOTES
Pt here for follow up and treatment.   She has a red itchy spot on her chest.  She states her neuropathy is worse.

## 2025-02-06 NOTE — PROGRESS NOTES
Cancer Center Progress Note  Patient Name: Monica Holguin   YOB: 1960   Medical Record Number: [id number]   [facility]: [id number]   Attending Physician: Celio Trujillo M.D.       Date of Visit: 12/5/2024      Chief Complaint:  No chief complaint on file.       Oncologic History:  Monica Holguin is a 65 year old female referred by Dr. 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  The patient was initially diagnosed with anal cancer in 2013 when she complained of rectal bleeding and inability to use a tampon during her menstrual periods.  She was seen by gynecology, who detected a vaginal mass and referred her to GI for colonoscopy and biopsy.  She also had severe Fe deficiency anemia, at the time, with a hgb around 4.  She was treated with chemoRT by Dr. Berumen at [facility] in [place].  She recalls Mitomycin C and 5FU as the chemo agents. She had a complete response.  She transferred her care to [facility], due to an insurance change, and has been following there with q6 month exams and annual CT.  She has been told that there has been no evidence of recurrence.  Her insurance has mandated a change, again, and she has been referred here for ongoing care.  She has also seen Dr. Ayon, Surgery, who has planned an exam under anesthesthesia to document the absence of recurrence.  She complains of intermittent BRBPR, that she attributes to an anal fissure that is aggravated by her intermittent constipation.  She has also been told that her Thyroid is \"off\" and that may be contributing to her constipation.  She reports a history of IBS with constipation prior to her cancer diagnosis.  Her colonoscopy in November, 2016 was reportedly normal.  Repeat C-scope in Nov, 2017 revealed no evidence of disease.        She saw gynecology for an exam on 8/9/17.        She was seen in the ER on 8/23/24 complaining of abdominal pain. CT revealed a large endometrial mass and prominent but not enlarged  Pt inducted on zubsolv.     Visit Vitals  /80 (BP Location: [facility], Patient Position: Sitting)   Pulse 98   Temp 97.9 °F (36.6 °C) (Oral)   Resp 17     Opiate Withdrawal Scale    Resting Pulse:1 -  (18 1502)  GI upset: 1- Stomach cramps (18 150)  Sweatin - Subjective reports of chills or flushing (18 150)  Tremor: 1 - Tremor can be felt, but not observed (18 150)  Restlessness: 1 - Reports difficulty sitting still, but is able to do so (18 150)  Yawnin - No yawning (18 150)  Puple Size: 1 - Possibly larger than normal for room light (18 150)  Anxiety or Irritability: 1 - Reports increasing irritability or anxiousness (18 150)  Bone or Joint aches: 1 - Mild diffused discomfort (18 150)  Gooseflesh skin: 0 - Skin is smooth (18 150)  Running nose or tearin - Not present (18 150)  Total Score: 8 (18 150)     retroperitoneal nodes. She was referred back for evaluation. She was referred to gyn onc and saw Dr. Valdivia    On 9/26/24 she underwent an ex-lap, MONSE/BSO, periaortic lymph node dissection at [facility].   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 Tumor deposits were seen in the lymphatics. Tumor cells were positive for p16. The tumor involved the cervix and 3 of 5 periaortic nodes were positive for metastatic carcinoma. MMR was intact.  Stage IIIc2. Dr. Valdivia recommended 6 cycles of adjuvant Carbo/Taxol with a checkpoint inhibitor. This would possibly be followed by periaortic radiation.     History of Present Illness:  Pt is here for follow up. She tolerated cycle 5 with fatigue increased neuropathy of the feet.   She has a few \"itchy spots\"      Performance Status:  ECOG 1    Past Medical History:  Past Medical History:    Anal cancer (HCC)    Anal cancer (HCC)    Anesthesia complication    slow to arouse    Anxiety state    Asthma (HCC)    excerise induced    Back problem    upper back pain from radiation    Bipolar disorder (HCC)    Bloating    Blood disorder    anemia resolved    Constipation    Years    Depression    Disorder of thyroid    Exposure to radiation    last dose February 2014    Fatigue    Fibromyalgia    Frequent urination    Headache disorder    Neck problems    History of blood transfusion    History of depression    Hx of motion sickness    Hypothyroidism (acquired)    Dx in 8/2017    Irregular bowel habits    Leaking of urine    After surgery    Migraines    in the past    Nausea    Gallbladder attacks    Neoplasm, breast    Pain in joints    Chemo    Personal history of antineoplastic chemotherapy     last dose 2/2014, patient also completed radiation therapy 2014    PONV (postoperative nausea and vomiting)    Presence of other cardiac implants and grafts    Left ankle    Restless leg syndrome    Restless leg syndrome    Sleep disturbance    Stress     Uncomfortable fullness after meals    Visual impairment    glasses,contacts    Wears glasses       Past Surgical History:  Past Surgical History:   Procedure Laterality Date          Colonoscopy N/A 11/10/2017    Procedure: COLONOSCOPY;  Surgeon: Ayan Ayon DO;  Location:  ENDOSCOPY    Colonoscopy N/A 2018    Procedure: COLONOSCOPY, POSSIBLE BIOPSY, POSSIBLE POLYPECTOMY 99888;  Surgeon: Ayan Ayon DO;  Location: [facility]    Hysterectomy      Estrada biopsy stereo nodule 1 site right (cpt=19081)      benign     Estrada biopsy stereo nodule 2 site bilat (cpt=19081/79623)      benign     Estrada localization wire 1 site left (cpt=19281)      Estrada localization wire 1 site right (cpt=19281)      Other      left knee surgery- allograft    Other      left ankle fracture     Other  2017    Anal exam under anesthesia    Other surgical history  10/2020    Port removal     Port, indwelling, imp      Sigmoidoscopy,diagnostic      Tonsillectomy  1963    Total abdom hysterectomy  24       Family History:  Family History   Problem Relation Age of Onset    Cancer Father         lung    Other (anuyrsem) Mother     Thyroid disease Sister         Hypothyroidism on [place] thyroid    Cancer Self        Social History:  Social History     Socioeconomic History    Marital status:      Spouse name: Not on file    Number of children: Not on file    Years of education: Not on file    Highest education level: Not on file   Occupational History    Not on file   Tobacco Use    Smoking status: Never    Smokeless tobacco: Never    Tobacco comments:     non-smoker   Vaping Use    Vaping status: Never Used   Substance and Sexual Activity    Alcohol use: Yes     Comment: rare    Drug use: No    Sexual activity: Not Currently   Other Topics Concern     Service Not Asked    Blood Transfusions Not Asked    Caffeine Concern Yes     Comment: 1 cup coffee per day    Occupational Exposure Not Asked    Hobby  Hazards Not Asked    Sleep Concern Not Asked    Stress Concern Not Asked    Weight Concern Not Asked    Special Diet Not Asked    Back Care Not Asked    Exercise Yes     Comment: walking    Bike Helmet Not Asked    Seat Belt Yes    Self-Exams Not Asked   Social History Narrative    Not on file     Social Drivers of Health     Food Insecurity: Patient Declined (1/1/2025)    Received from [facility]    Hunger Vital Sign     Worried About Running Out of Food in the Last Year: Patient declined     Ran Out of Food in the Last Year: Patient declined   Transportation Needs: Patient Declined (1/1/2025)    Received from [facility]    PRAPARE - Transportation     Lack of Transportation (Medical): Patient declined     Lack of Transportation (Non-Medical): Patient declined   Housing Stability: Unknown (1/1/2025)    Received from [facility]    Housing Stability Vital Sign     Unable to Pay for Housing in the Last Year: Patient declined     Number of Times Moved in the Last Year: Not on file     Homeless in the Last Year: Not on file       Current Medications:    Current Outpatient Medications:     LEVOTHYROXINE 100 MCG Oral Tab, TAKE 1 TABLET(100 MCG) BY MOUTH BEFORE BREAKFAST, Disp: 90 tablet, Rfl: 0    pantoprazole 40 MG Oral Tab EC, Take 1 tablet (40 mg total) by mouth 2 (two) times daily before meals., Disp: 180 tablet, Rfl: 1    Wheat Dextrin (BENEFIBER OR), Take by mouth., Disp: , Rfl:     metoclopramide 10 MG Oral Tab, Take 1 tablet (10 mg total) by mouth 4 (four) times daily as needed (nausea)., Disp: 60 tablet, Rfl: 3    ondansetron 8 MG Oral Tablet Dispersible, Take 1 tablet (8 mg total) by mouth every 8 (eight) hours as needed for Nausea., Disp: 30 tablet, Rfl: 3    lidocaine-prilocaine 2.5-2.5 % External Cream, Apply to site 1 hour prior to port a cath needle insertion, Disp: 1 each, Rfl: 0    Gabapentin Enacarbil ER (HORIZANT) 300 MG Oral Tab CR, Take  100 mg by mouth daily., Disp: , Rfl:     Allergies:  Allergies   Allergen Reactions    Chlorhexidine HIVES    Citalopram HIVES    Etomidate ANAPHYLAXIS    Gabapentin OTHER (SEE COMMENTS)     swelling    Lexapro [Escitalopram] SWELLING     Legs and ankles    Mold WHEEZING    Pramipexole SWELLING    Pregabalin SWELLING    Quetiapine Fumarate HIVES, ITCHING and INSOMNIA    Dust OTHER (SEE COMMENTS)     Sneezing, sinus infection    Ropinirole OTHER (SEE COMMENTS)     Restless legs    Adhesive Tape RASH     Need to use surgery glue    Promethazine NAUSEA ONLY        Review of Systems:    Constitutional No fevers, chills, night sweats, excessive fatigue or weight loss.   Eyes No significant visual difficulties. No diplopia. No yellowing of the eyes.   Hematologic/Lymphatic No easy bruising or bleeding.  No any tender or palpable lymph nodes.   Respiratory No dyspnea, Pleuritic chest pain, cough or hemoptysis.   Cardiovascular No anginal chest pain, palpitations or orthopnea.   Gastrointestinal No nausea, vomiting, diarrhea, GI bleeding, or constipation.   Genitorurinary  No hematuria, dysuria, or incontinence. No abnormal bleeding.   Integumentary No rashes or yellowing of the skin   Neurologic No headache, blurred vision, and no areas of focal weakness. Normal gait.   Psychiatric No insomnia, depression, phyllis or mood swings.         Vital Signs:  Height: --  Weight: --  BSA (Calculated - sq m): --  Pulse: --  BP: --  Temp: --  Do Not Use - Resp Rate: --  SpO2: --    Physical Examination:    Laboratory:  Recent Labs     02/06/25  0912   RBC 3.39 L   HGB 10.6 L   HCT 31.7 L   MCV 93.5   MCH 31.3   MCHC 33.4   RDW 16.2   NEPRELIM 1.50   WBC 2.9 L   .0     Recent Labs     02/06/25  0912   GLU 93   BUN 16   CREATSERUM 0.66   CA 9.9   ALB 4.2      K 4.0      CO2 26.0   ALKPHO 77   AST 19   ALT 27   BILT 0.4   TP 6.5         Radiology:  PET 10/29/24:  CONCLUSION:    1. Multifocal retroperitoneal  lymphadenopathy with abnormal radiotracer uptake as described above.   2. Bilateral shotty inguinal lymph nodes with abnormal radiotracer uptake involving a 8 mm lymph node in the left inguinal region with a max SUV of 8.93 consistent with lymphadenopathy.  Right inguinal lymph node is equivocal with max SUV of 2.82.   3. Focus of lymphadenopathy within the posterior mediastinum, involving the left paraesophageal region at the level of the midesophagus with a max SUV of 8.27.  Additionally, at the GE junction there is a softened JOSEFA wall thickening with abnormal   increased radiotracer uptake of 7.08.  The possibility of a distal esophageal neoplasm cannot be excluded.             Pathology:  Final Diagnosis:   Right inferior parathyroid:  -Hypercellular parathyroid gland, 10 mm, with rim tissue.  -See comment.       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 Adjuvant chemo-immunotherapy has been recommended with Carbo Taxol + A checkpoint inhibitor x6 cycles. Radiation use is limited in her case due to the prior Rt for the anal canal cancer. Once she completes the adjuvant chemo-Immunotherapy, will plan for her to see Radiation to consider RT to the periaortic nodes, though I was concerned about the esophageal findings and mediastinal node. EGD revealed only esophagitis.  Proceed with Cycle 6 of Carbo/Taxol/Keytruda.  Check CT C/A/P.     Retroperitoneal adenopathy. Consider RT upon completion of adjuvant chemo.     History of Anal Canal cancer:  KASSANDRA.  Continue follow up with surgery. She is overdue for her colonoscopy. She is scheduled to see Dr. Ayon in September.     Planned Follow Up:  3 weeks.    The patient and I have a longitudinal relationship to address/treat this serious and complex condition      Electronically Signed by:    Celio Trujillo M.D.  [facility]

## 2025-02-21 ENCOUNTER — HOSPITAL ENCOUNTER (OUTPATIENT)
Dept: CT IMAGING | Facility: HOSPITAL | Age: 65
Discharge: HOME | End: 2025-02-21
Attending: INTERNAL MEDICINE
Payer: MEDICARE

## 2025-02-21 DIAGNOSIS — C54.1 ENDOMETRIAL CANCER, FIGO STAGE IIIC2 (HCC): ICD-10-CM

## 2025-02-21 PROCEDURE — 74177 CT ABD & PELVIS W/CONTRAST: CPT | Performed by: INTERNAL MEDICINE

## 2025-02-21 PROCEDURE — 71260 CT THORAX DX C+: CPT | Performed by: INTERNAL MEDICINE

## 2025-02-27 ENCOUNTER — OFFICE VISIT (OUTPATIENT)
Age: 65
End: 2025-02-27
Attending: INTERNAL MEDICINE
Payer: MEDICARE

## 2025-02-27 VITALS
HEART RATE: 87 BPM | RESPIRATION RATE: 16 BRPM | DIASTOLIC BLOOD PRESSURE: 86 MMHG | TEMPERATURE: 98 F | WEIGHT: 196.5 LBS | HEIGHT: 62.99 IN | BODY MASS INDEX: 34.82 KG/M2 | SYSTOLIC BLOOD PRESSURE: 130 MMHG | OXYGEN SATURATION: 97 %

## 2025-02-27 DIAGNOSIS — R59.0 LYMPHADENOPATHY, RETROPERITONEAL: ICD-10-CM

## 2025-02-27 DIAGNOSIS — C54.1 ENDOMETRIAL CANCER, FIGO STAGE IIIC2 (HCC): Primary | ICD-10-CM

## 2025-02-27 DIAGNOSIS — Z51.11 ENCOUNTER FOR CHEMOTHERAPY MANAGEMENT: ICD-10-CM

## 2025-02-27 LAB
ALBUMIN SERPL-MCNC: 4.7 G/DL (ref 3.2–4.8)
ALBUMIN/GLOB SERPL: 1.9 {RATIO} (ref 1–2)
ALP LIVER SERPL-CCNC: 78 U/L
ALT SERPL-CCNC: 31 U/L
ANION GAP SERPL CALC-SCNC: 5 MMOL/L (ref 0–18)
AST SERPL-CCNC: 20 U/L (ref ?–34)
BASOPHILS # BLD AUTO: 0.02 X10(3) UL (ref 0–0.2)
BASOPHILS NFR BLD AUTO: 0.7 %
BILIRUB SERPL-MCNC: 0.5 MG/DL (ref 0.2–1.1)
BUN BLD-MCNC: 19 MG/DL (ref 9–23)
CALCIUM BLD-MCNC: 10.7 MG/DL (ref 8.7–10.6)
CHLORIDE SERPL-SCNC: 105 MMOL/L (ref 98–112)
CO2 SERPL-SCNC: 29 MMOL/L (ref 21–32)
CREAT BLD-MCNC: 0.81 MG/DL
EGFRCR SERPLBLD CKD-EPI 2021: 81 ML/MIN/1.73M2 (ref 60–?)
EOSINOPHIL # BLD AUTO: 0.04 X10(3) UL (ref 0–0.7)
EOSINOPHIL NFR BLD AUTO: 1.3 %
ERYTHROCYTE [DISTWIDTH] IN BLOOD BY AUTOMATED COUNT: 15.7 %
GLOBULIN PLAS-MCNC: 2.5 G/DL (ref 2–3.5)
GLUCOSE BLD-MCNC: 99 MG/DL (ref 70–99)
HCT VFR BLD AUTO: 34.7 %
HGB BLD-MCNC: 11.8 G/DL
IMM GRANULOCYTES # BLD AUTO: 0.05 X10(3) UL (ref 0–1)
IMM GRANULOCYTES NFR BLD: 1.6 %
LYMPHOCYTES # BLD AUTO: 0.7 X10(3) UL (ref 1–4)
LYMPHOCYTES NFR BLD AUTO: 22.8 %
MCH RBC QN AUTO: 32.1 PG (ref 26–34)
MCHC RBC AUTO-ENTMCNC: 34 G/DL (ref 31–37)
MCV RBC AUTO: 94.3 FL
MONOCYTES # BLD AUTO: 0.43 X10(3) UL (ref 0.1–1)
MONOCYTES NFR BLD AUTO: 14 %
NEUTROPHILS # BLD AUTO: 1.83 X10 (3) UL (ref 1.5–7.7)
NEUTROPHILS # BLD AUTO: 1.83 X10(3) UL (ref 1.5–7.7)
NEUTROPHILS NFR BLD AUTO: 59.6 %
OSMOLALITY SERPL CALC.SUM OF ELEC: 290 MOSM/KG (ref 275–295)
PLATELET # BLD AUTO: 177 10(3)UL (ref 150–450)
POTASSIUM SERPL-SCNC: 4 MMOL/L (ref 3.5–5.1)
PROT SERPL-MCNC: 7.2 G/DL (ref 5.7–8.2)
RBC # BLD AUTO: 3.68 X10(6)UL
SODIUM SERPL-SCNC: 139 MMOL/L (ref 136–145)
T4 FREE SERPL-MCNC: 1.4 NG/DL (ref 0.8–1.7)
TSI SER-ACNC: 0.73 UIU/ML (ref 0.55–4.78)
WBC # BLD AUTO: 3.1 X10(3) UL (ref 4–11)

## 2025-02-27 RX ORDER — GABAPENTIN ENACARBIL 600 MG/1
1200 TABLET, EXTENDED RELEASE ORAL NIGHTLY
Qty: 60 TABLET | Refills: 2 | Status: SHIPPED | OUTPATIENT
Start: 2025-02-27 | End: 2025-03-29

## 2025-02-27 NOTE — PROGRESS NOTES
Cancer Center Progress Note  Patient Name: Monica Holguin   YOB: 1960   Medical Record Number: [id number]   [facility]: [id number]   Attending Physician: Celio Trujillo M.D.       Date of Visit: 2/27/2025        Chief Complaint:  Chief Complaint   Patient presents with    Follow - Up        Oncologic History:  Monica Holguin is a 65 year old female referred by Dr. 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  The patient was initially diagnosed with anal cancer in 2013 when she complained of rectal bleeding and inability to use a tampon during her menstrual periods.  She was seen by gynecology, who detected a vaginal mass and referred her to GI for colonoscopy and biopsy.  She also had severe Fe deficiency anemia, at the time, with a hgb around 4.  She was treated with chemoRT by Dr. Berumen at [facility] in [place].  She recalls Mitomycin C and 5FU as the chemo agents. She had a complete response.  She transferred her care to [facility], due to an insurance change, and has been following there with q6 month exams and annual CT.  She has been told that there has been no evidence of recurrence.  Her insurance has mandated a change, again, and she has been referred here for ongoing care.  She has also seen Dr. Ayon, Surgery, who has planned an exam under anesthesthesia to document the absence of recurrence.  She complains of intermittent BRBPR, that she attributes to an anal fissure that is aggravated by her intermittent constipation.  She has also been told that her Thyroid is \"off\" and that may be contributing to her constipation.  She reports a history of IBS with constipation prior to her cancer diagnosis.  Her colonoscopy in November, 2016 was reportedly normal.  Repeat C-scope in Nov, 2017 revealed no evidence of disease.        She saw gynecology for an exam on 8/9/17.        She was seen in the ER on 8/23/24 complaining of abdominal pain. CT revealed a large endometrial mass  and prominent but not enlarged retroperitoneal nodes. She was referred back for evaluation. She was referred to gyn onc and saw Dr. Valdivia    On 9/26/24 she underwent an ex-lap, MONSE/BSO, periaortic lymph node dissection at [facility].   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 Tumor deposits were seen in the lymphatics. Tumor cells were positive for p16. The tumor involved the cervix and 3 of 5 periaortic nodes were positive for metastatic carcinoma. MMR was intact.  Stage IIIc2. Dr. Valdivia recommended 6 cycles of adjuvant Carbo/Taxol with a checkpoint inhibitor. This would possibly be followed by periaortic radiation.     History of Present Illness:  Pt is here for follow up. She tolerated cycle 5 with fatigue increased neuropathy of the feet.   She has a few \"itchy spots\"      Performance Status:  ECOG 1    Past Medical History:  Past Medical History:    Anal cancer (HCC)    Anal cancer (HCC)    Anesthesia complication    slow to arouse    Anxiety state    Asthma (HCC)    excerise induced    Back problem    upper back pain from radiation    Bipolar disorder (HCC)    Bloating    Blood disorder    anemia resolved    Constipation    Years    Depression    Disorder of thyroid    Exposure to radiation    last dose February 2014    Fatigue    Fibromyalgia    Frequent urination    Headache disorder    Neck problems    History of blood transfusion    History of depression    Hx of motion sickness    Hypothyroidism (acquired)    Dx in 8/2017    Irregular bowel habits    Leaking of urine    After surgery    Migraines    in the past    Nausea    Gallbladder attacks    Neoplasm, breast    Pain in joints    Chemo    Personal history of antineoplastic chemotherapy     last dose 2/2014, patient also completed radiation therapy 2014    PONV (postoperative nausea and vomiting)    Presence of other cardiac implants and grafts    Left ankle    Restless leg syndrome    Restless leg syndrome     Sleep disturbance    Stress    Uncomfortable fullness after meals    Visual impairment    glasses,contacts    Wears glasses       Past Surgical History:  Past Surgical History:   Procedure Laterality Date          Colonoscopy N/A 11/10/2017    Procedure: COLONOSCOPY;  Surgeon: Ayan Ayon DO;  Location:  ENDOSCOPY    Colonoscopy N/A 2018    Procedure: COLONOSCOPY, POSSIBLE BIOPSY, POSSIBLE POLYPECTOMY 86375;  Surgeon: Ayan Ayon DO;  Location: [facility]    Hysterectomy      Estrada biopsy stereo nodule 1 site right (cpt=19081)      benign     Estrada biopsy stereo nodule 2 site bilat (cpt=19081/24139)      benign     Estrada localization wire 1 site left (cpt=19281)      Estrada localization wire 1 site right (cpt=19281)      Other      left knee surgery- allograft    Other      left ankle fracture     Other  2017    Anal exam under anesthesia    Other surgical history  10/2020    Port removal     Port, indwelling, imp      Sigmoidoscopy,diagnostic      Tonsillectomy  1963    Total abdom hysterectomy  24       Family History:  Family History   Problem Relation Age of Onset    Cancer Father         lung    Other (anuyrsem) Mother     Thyroid disease Sister         Hypothyroidism on [place] thyroid    Cancer Self        Social History:  Social History     Socioeconomic History    Marital status:      Spouse name: Not on file    Number of children: Not on file    Years of education: Not on file    Highest education level: Not on file   Occupational History    Not on file   Tobacco Use    Smoking status: Never    Smokeless tobacco: Never    Tobacco comments:     non-smoker   Vaping Use    Vaping status: Never Used   Substance and Sexual Activity    Alcohol use: Yes     Comment: rare    Drug use: No    Sexual activity: Not Currently   Other Topics Concern     Service Not Asked    Blood Transfusions Not Asked    Caffeine Concern Yes     Comment: 1 cup coffee per day    Occupational  Exposure Not Asked    Hobby Hazards Not Asked    Sleep Concern Not Asked    Stress Concern Not Asked    Weight Concern Not Asked    Special Diet Not Asked    Back Care Not Asked    Exercise Yes     Comment: walking    Bike Helmet Not Asked    Seat Belt Yes    Self-Exams Not Asked   Social History Narrative    Not on file     Social Drivers of Health     Food Insecurity: Patient Declined (1/1/2025)    Received from [facility]    Hunger Vital Sign     Worried About Running Out of Food in the Last Year: Patient declined     Ran Out of Food in the Last Year: Patient declined   Transportation Needs: Patient Declined (1/1/2025)    Received from [facility]    PRAPARE - Transportation     Lack of Transportation (Medical): Patient declined     Lack of Transportation (Non-Medical): Patient declined   Housing Stability: Unknown (1/1/2025)    Received from [facility]    Housing Stability Vital Sign     Unable to Pay for Housing in the Last Year: Patient declined     Number of Times Moved in the Last Year: Not on file     Homeless in the Last Year: Not on file       Current Medications:    Current Outpatient Medications:     LEVOTHYROXINE 100 MCG Oral Tab, TAKE 1 TABLET(100 MCG) BY MOUTH BEFORE BREAKFAST, Disp: 90 tablet, Rfl: 0    pantoprazole 40 MG Oral Tab EC, Take 1 tablet (40 mg total) by mouth 2 (two) times daily before meals., Disp: 180 tablet, Rfl: 1    Wheat Dextrin (BENEFIBER OR), Take by mouth., Disp: , Rfl:     metoclopramide 10 MG Oral Tab, Take 1 tablet (10 mg total) by mouth 4 (four) times daily as needed (nausea)., Disp: 60 tablet, Rfl: 3    ondansetron 8 MG Oral Tablet Dispersible, Take 1 tablet (8 mg total) by mouth every 8 (eight) hours as needed for Nausea., Disp: 30 tablet, Rfl: 3    lidocaine-prilocaine 2.5-2.5 % External Cream, Apply to site 1 hour prior to port a cath needle insertion, Disp: 1 each, Rfl: 0    Gabapentin Enacarbil ER  (HORIZANT) 300 MG Oral Tab CR, Take 100 mg by mouth daily., Disp: , Rfl:     Allergies:  Allergies   Allergen Reactions    Chlorhexidine HIVES    Citalopram HIVES    Etomidate ANAPHYLAXIS    Gabapentin OTHER (SEE COMMENTS)     swelling    Lexapro [Escitalopram] SWELLING     Legs and ankles    Mold WHEEZING    Pramipexole SWELLING    Pregabalin SWELLING    Quetiapine Fumarate HIVES, ITCHING and INSOMNIA    Dust OTHER (SEE COMMENTS)     Sneezing, sinus infection    Ropinirole OTHER (SEE COMMENTS)     Restless legs    Adhesive Tape RASH     Need to use surgery glue    Promethazine NAUSEA ONLY        Review of Systems:    Constitutional No fevers, chills, night sweats, excessive fatigue or weight loss.   Eyes No significant visual difficulties. No diplopia. No yellowing of the eyes.   Hematologic/Lymphatic No easy bruising or bleeding.  No any tender or palpable lymph nodes.   Respiratory No dyspnea, Pleuritic chest pain, cough or hemoptysis.   Cardiovascular No anginal chest pain, palpitations or orthopnea.   Gastrointestinal No nausea, vomiting, diarrhea, GI bleeding, or constipation.   Genitorurinary  No hematuria, dysuria, or incontinence. No abnormal bleeding.   Integumentary No rashes or yellowing of the skin   Neurologic No headache, blurred vision, and no areas of focal weakness. Normal gait.   Psychiatric No insomnia, depression, phyllis or mood swings.         Vital Signs:  Height: 160 cm (5' 2.99\") (02/27 0937)  Weight: 89.1 kg (196 lb 8 oz) (02/27 0937)  BSA (Calculated - sq m): 1.92 sq meters (02/27 0937)  Pulse: 87 (02/27 0937)  BP: 130/86 (02/27 0937)  Temp: 97.5 °F (36.4 °C) (02/27 0937)  Do Not Use - Resp Rate: --  SpO2: 97 % (02/27 0937)    Physical Examination:    Laboratory:  Recent Labs     02/27/25 0922   RBC 3.68 L   HGB 11.8 L   HCT 34.7 L   MCV 94.3   MCH 32.1   MCHC 34.0   RDW 15.7   NEPRELIM 1.83   WBC 3.1 L   .0     Recent Labs     02/27/25 0922   GLU 99   BUN 19   CREATSERUM 0.81   CA  10.7 H   ALB 4.7      K 4.0      CO2 29.0   ALKPHO 78   AST 20   ALT 31   BILT 0.5   TP 7.2     Radiology:  CT C/A/P: CONCLUSION:       1. Gallstones in the gallbladder.      2. No evidence of metastatic disease.      3. No lymphadenopathy.      4. No suspicious nodule, mass or consolidation.      5. Nonspecific mild bladder wall thickening.  This is of unknown etiology.  This may be sequelae of cystitis.               Pathology:  Final Diagnosis:   Right inferior parathyroid:  -Hypercellular parathyroid gland, 10 mm, with rim tissue.  -See comment.       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 Adjuvant chemo-immunotherapy has been recommended with Carbo Taxol + A checkpoint inhibitor x6 cycles. Radiation use is limited in her case due to the prior Rt for the anal canal cancer. Once she completes the adjuvant chemo-Immunotherapy, will plan for her to see Radiation to consider RT to the periaortic nodes, though I was concerned about the esophageal findings and mediastinal node. EGD revealed only esophagitis.  Proceed with Cycle 6 of Carbo/Taxol/Keytruda.  CT Demonstrates KASSANDRA. Will proceed with adjuvant Keytruda.     Retroperitoneal adenopathy. Consider RT. Pt will contact her prior radiation oncologist.    History of Anal Canal cancer:  KASSANDRA.  Continue follow up with surgery.     No contraindication to cholecystectomy from an oncology perspective.    Planned Follow Up:  3 weeks.    The patient and I have a longitudinal relationship to address/treat this serious and complex condition      Electronically Signed by:    Celio Trujillo M.D.  [facility]

## 2025-02-28 ENCOUNTER — TELEPHONE (OUTPATIENT)
Dept: RADIATION ONCOLOGY | Facility: HOSPITAL | Age: 65
End: 2025-02-28

## 2025-02-28 ENCOUNTER — TELEPHONE (OUTPATIENT)
Age: 65
End: 2025-02-28

## 2025-02-28 NOTE — TELEPHONE ENCOUNTER
Patient would like to speak with Nurse to clarify how often she suppose to get her treatments. Is it every 3 or 6 weeks.      Please contact patient.

## 2025-02-28 NOTE — TELEPHONE ENCOUNTER
Pt is calling to schedule new consult with first available Dr. Referral in system by Dr. Trujillo      Please contact patient.

## 2025-03-17 ENCOUNTER — HOSPITAL ENCOUNTER (OUTPATIENT)
Dept: RADIATION ONCOLOGY | Facility: HOSPITAL | Age: 65
End: 2025-03-17
Attending: RADIOLOGY
Payer: MEDICARE

## 2025-03-17 VITALS
HEART RATE: 69 BPM | TEMPERATURE: 98 F | HEIGHT: 63 IN | DIASTOLIC BLOOD PRESSURE: 84 MMHG | RESPIRATION RATE: 20 BRPM | WEIGHT: 194.38 LBS | OXYGEN SATURATION: 98 % | SYSTOLIC BLOOD PRESSURE: 149 MMHG | BODY MASS INDEX: 34.44 KG/M2

## 2025-03-17 DIAGNOSIS — C54.1 ENDOMETRIAL CANCER, FIGO STAGE IIIC2 (HCC): Primary | ICD-10-CM

## 2025-03-17 PROCEDURE — 99214 OFFICE O/P EST MOD 30 MIN: CPT

## 2025-03-17 NOTE — CONSULTS
[facility]  RADIATION ONCOLOGY  CONSULTATION     PATIENT:   Monica Holguin        REFERRING MD:  ZAHRAA Trujillo MD  DIAGNOSIS:   Endometrial cancer      HPI   66 yo here with .    Hx of anal cancer s/p chemoradiation in [id number] at [facility]. She currently has good bowel function, occ blood, mild discomfort, good continence.    Back in 8/2024, she had abd pain that she thought was her gall bladder. CT 8/2024 in [facility] showed an 8 x 9 x 12 cm uterine mass. Mildly prominent RP nodes noted. But in retrospect, there appears to be clear RPLN from left common iliac upwards to renal vessel level, but below the SMA level. No clear R ext/int iliac obturator adenopathy.    She did not have vaginal bleeding at the time.    On 9/26/2024 she underwent ex/lap, MONSE, BSO and RPLND. Intra-op, no pelvic LAD was noted. Bilateral abnormal PALNs were noted. Uterus abnormal, but tubes/ovaries normal. Specimen A is the uterus with some cervix. 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 Extensive LVI. Small focal tumor in R ovarian lymphatic. Left tube/ovary negative. 2 of 4 left periaortic nodes are positive, with 2.2 cm the larger, with ERICKSON. 1 right olga-aortic node is positive, a 6 mm focus. Washings negative.    PET 10/29/2024:  7 mm periesophageal nodule with SUV 8 noted at mid esophagus level. Focal thickening at GE junction with SUV 7. Bilateral inguinal nodes with SUV 9 on the left and SUV 3 on the right. RPLNs from T11 level down to S1 level.      EGD/EUS 11/2024 showed erosive esophagitis, Schatzki's ring, 3 cm hiatal hernia, but no malignant adenopathy was visualized.    She has now completed 6 cycles of carboplatin, Taxol, with pembrolizumab as of 2/27/2025. Plan to continue further Keytruda.    CT 2/21/2025 shows no pathologically enlarged adenopathy.    Reports good urinary function. No  vaginal bleeding. No pelvic pain. Has fatigue and some PN.     PMH  -anal cancer, uterine cancer, hypothyroid, restless legs    PSH  -ex/lap, MONSE, BSO, tonsils, C section    MEDS   Horizant    levothyroxine (SYNTHROID)    lidocaine-prilocaine 2.5-2.5 % External Cream    metoclopramide (REGLAN)    ondansetron (ZOFRAN-ODT)    pantoprazole (PROTONIX)    Wheat Dextrin (BENEFIBER OR)     SH  -lives in [place]    ROS  -pertinent items in HPI.     PHYSICAL EXAM   ECO  GENERAL: 194 pounds.  HEENT: No LAD.  CHEST: Regular rate and rhythm.    Clear to auscultation.   ABD:  Soft, non-tender.  EXT:  No edema.  NEURO: Alert and oriented.    IMPRESSION/PLAN   66 yo with hx of anal cancer s/p CRT in , currently KASSANDRA with fairly good anorectal function. 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 No pelvic nodes sampled. No pelvic nodes grossly abnormal intra-op, preop CT and postop PET. But bilateral RPLN abnormal on preop CT, intra-op appearance, 3 of 5 PALNs positive on path, with PET+ RPLNs from the T11/T12 level down to S1 level.    I think consolidative RT to the RPLNs from T11 level down to L5 level would be beneficial in getting better disease control. I would like to include the S1 level as well, but I am concerned about overlap with prior pelvic RT field for anal cancer in 2014. We are requesting those records for review.    If there is overlap, then I think we could consider RT from T11 down to the superior border of the prior RT field, then in the future, consider SBRT to any measurable PET+ nodes that appear in isolation at the S1 level.     One would also typically add vaginal brachytherapy b/c of the cervical stromal involvement, but I am not sure exactly what dose was given before in treating her anal cancer. If she received 45 Gy or less, even with chemo, I  think we might consider vaginal brachytherapy, given how difficult a vaginal cuff recurrence would be to manage.    Despite the negative EGD/EUS, I still think the posterior mediastinum should be watched carefully on future scans for evidence of relapse, as her October PET was pretty suspicious looking.    We discussed digestive and bowel side effects of RT to the RPLNs. Will need to use IMRT/IGRT given the prior RT to the pelvis. We would like to minimize the aggregate cumulative dose to her small and large bowel as much as possible.    Plan:  *RT to RPLN to about 50 Gy, depending on prior RT   *consider vaginal brachytherapy, depending on prior RT dose received  *continue pembro    Tyree Guevara MD  Radiation Oncology    CC: MD SHABANA Bermeo MD    60 minutes were spent with the patient, more than 50 percent on counseling/coordination of care (discuss disease status, goals of therapy, methods of radiation therapy, side effect discussion, role of RT in overall care)

## 2025-03-17 NOTE — PROGRESS NOTES
Nursing Consultation Note  Patient: Monica Holguin  YOB: 1960  Age: 65 year old  Radiation Oncologist: Dr. Tyree Guevara  Referring Physician: Celio Trujillo  Diagnosis: UTERINE CA  Consult Date: 3/17/2025      Chemotherapy: s/p chemo in 2013, then received chemoIO x6 cycles in Feb 2025, now on Keytruda q6 weeks (next cycle on 4/10)    Labs: Reviewed  Imaging: Reviewed  Is the patient of child-bearing age?         No  Has the patient received radiation therapy in the past? yes, received pelvic RT in [id number] ([facility] in [facility], Dr. David Berry)    Does the patient have an implantable device?No   Patient has/has had:     1. Assistive Devices: N/A    2. Flu Vaccination: no-referral to ask PCP    3. Pneumonia Vaccination:  no--referral to ask PCP    Vital Signs:   Vitals:    03/17/25 1500   BP: 149/84   Pulse: 69   Resp: 20   Temp: 98.4 °F (36.9 °C)   ,   Wt Readings from Last 6 Encounters:   03/17/25 88.2 kg (194 lb 6.4 oz)   02/27/25 89.1 kg (196 lb 8 oz)   02/06/25 89.8 kg (198 lb)   01/16/25 88 kg (194 lb)   12/26/24 86.6 kg (191 lb)   12/05/24 86.2 kg (190 lb)       Nursing Note: Has history of anal cancer in 2013. Received chemoRT. Pt went to ER in 8/2024 for abdominal pain. CT showed large endometrial mass and retroperitoneal nodes. Referred to Dr. Valdivia, underwent ex lap MONSE-BSO on 9/26/24. Path showed high-grade clear-cell and serous ca, p16+. Started chemoIO with Dr. Kerr. Completed 6 cycles on 2/27, cont to get maintenance Keytruda q6 weeks. Referred for RT consult. Pt here with , AOx4. Denies pelvic discomfort. BM with occ constipation related to meds, no rectal bleeding. No dysuria, incontinence. No vaginal bleeding.       Review of Systems   Constitutional: Negative.    HENT: Negative.     Eyes: Negative.    Respiratory:  Positive for shortness of breath.    Cardiovascular: Negative.    Gastrointestinal:  Positive for constipation.   Endocrine: Negative.     Genitourinary: Negative.    Musculoskeletal: Negative.    Allergic/Immunologic: Negative.    Neurological: Negative.    Hematological: Negative.    Psychiatric/Behavioral: Negative.            Allergies:  Allergies[1]    Current Outpatient Medications   Medication Sig Dispense Refill    Gabapentin Enacarbil ER (HORIZANT) 600 MG Oral Tab CR Take 1,200 mg by mouth nightly. 60 tablet 2    LEVOTHYROXINE 100 MCG Oral Tab TAKE 1 TABLET(100 MCG) BY MOUTH BEFORE BREAKFAST 90 tablet 0    pantoprazole 40 MG Oral Tab EC Take 1 tablet (40 mg total) by mouth 2 (two) times daily before meals. 180 tablet 1    Wheat Dextrin (BENEFIBER OR) Take by mouth.      metoclopramide 10 MG Oral Tab Take 1 tablet (10 mg total) by mouth 4 (four) times daily as needed (nausea). 60 tablet 3    ondansetron 8 MG Oral Tablet Dispersible Take 1 tablet (8 mg total) by mouth every 8 (eight) hours as needed for Nausea. 30 tablet 3    Gabapentin Enacarbil ER (HORIZANT) 300 MG Oral Tab CR Take 900 mg by mouth daily.      lidocaine-prilocaine 2.5-2.5 % External Cream Apply to site 1 hour prior to port a cath needle insertion (Patient not taking: Reported on 3/17/2025) 1 each 0       Preferred Pharmacy:  No Pharmacies Listed    Past Medical History:    Anal cancer (HCC)    Anal cancer (HCC)    Anesthesia complication    slow to arouse    Anxiety state    Asthma (HCC)    excerise induced    Back problem    upper back pain from radiation    Bipolar disorder (HCC)    Bloating    Blood disorder    anemia resolved    Constipation    Years    Depression    Disorder of thyroid    Exposure to radiation    last dose February 2014    Fatigue    Fibromyalgia    Frequent urination    Headache disorder    Neck problems    History of blood transfusion    History of depression    Hx of motion sickness    Hypothyroidism (acquired)    Dx in 8/2017    Irregular bowel habits    Leaking of urine    After surgery    Migraines    in the past    Nausea    Gallbladder attacks     Neoplasm, breast    Pain in joints    Chemo    Personal history of antineoplastic chemotherapy     last dose 2014, patient also completed radiation therapy     PONV (postoperative nausea and vomiting)    Presence of other cardiac implants and grafts    Left ankle    Restless leg syndrome    Restless leg syndrome    Sleep disturbance    Stress    Uncomfortable fullness after meals    Uterine cancer (HCC)    Visual impairment    glasses,contacts    Wears glasses       Past Surgical History:   Procedure Laterality Date          Colonoscopy N/A 11/10/2017    Procedure: COLONOSCOPY;  Surgeon: Ayan Ayon DO;  Location:  ENDOSCOPY    Colonoscopy N/A 2018    Procedure: COLONOSCOPY, POSSIBLE BIOPSY, POSSIBLE POLYPECTOMY 78930;  Surgeon: Ayan Ayon DO;  Location: [facility]    Hysterectomy      Estrada biopsy stereo nodule 1 site right (cpt=19081)      benign     Estrada biopsy stereo nodule 2 site bilat (cpt=19081/77237)      benign     Estrada localization wire 1 site left (cpt=19281)      Estrada localization wire 1 site right (cpt=19281)      Other      left knee surgery- allograft    Other      left ankle fracture     Other  2017    Anal exam under anesthesia    Other surgical history  10/2020    Port removal     Port, indwelling, imp      Sigmoidoscopy,diagnostic      Tonsillectomy  1963    Total abdom hysterectomy  24       Social History     Socioeconomic History    Marital status:      Spouse name: Not on file    Number of children: 3    Years of education: Not on file    Highest education level: Not on file   Occupational History    Not on file   Tobacco Use    Smoking status: Never    Smokeless tobacco: Never    Tobacco comments:     non-smoker   Vaping Use    Vaping status: Never Used   Substance and Sexual Activity    Alcohol use: Yes     Comment: rare    Drug use: No    Sexual activity: Not Currently     Partners: Male   Other Topics Concern     Service Not Asked     Blood Transfusions Not Asked    Caffeine Concern Yes     Comment: 1 cup coffee per day    Occupational Exposure Not Asked    Hobby Hazards Not Asked    Sleep Concern Not Asked    Stress Concern Not Asked    Weight Concern Not Asked    Special Diet Not Asked    Back Care Not Asked    Exercise Yes     Comment: walking    Bike Helmet Not Asked    Seat Belt Yes    Self-Exams Not Asked   Social History Narrative    , lives with     Has 2 sons, 1 daughter - all living in same state     Social Drivers of Health     Food Insecurity: Patient Declined (1/1/2025)    Received from [facility]    Hunger Vital Sign     Worried About Running Out of Food in the Last Year: Patient declined     Ran Out of Food in the Last Year: Patient declined   Transportation Needs: Patient Declined (1/1/2025)    Received from [facility]    PRAPARE - Transportation     Lack of Transportation (Medical): Patient declined     Lack of Transportation (Non-Medical): Patient declined   Housing Stability: Unknown (1/1/2025)    Received from [facility]    Housing Stability Vital Sign     Unable to Pay for Housing in the Last Year: Patient declined     Number of Times Moved in the Last Year: Not on file     Homeless in the Last Year: Not on file       ECOG:  Grade 0 - Fully active, able to carry on all predisease activities without restrictions.      Education: YES  Knowledge Deficit Plan Of Care:    Problem:  Knowledge Deficit    Problems related to:    Radiation therapy    Interventions:  Instruct on purpose of radiation therapy    Expected Outcomes:  Knowledge of radiation therapy    Progress Toward Outcome:  Making progress    Pamphlets/Handouts Given to Patient:  Understanding radiation therapy      Are ADL's met?  Yes  Does patient feel safe in their environment?  Yes  Care decisions:  Patient and/or surrogate IS involved in care decisions.  Advanced directives:  Patient  DOES NOT have advanced directives.  Transportation:  Adequate transportation available for expected visits    Pain:  Pain Loc: Foot (neuropathy to feet, fingers);Pain Score: 4   ;    ;          [1]   Allergies  Allergen Reactions    Chlorhexidine HIVES    Citalopram HIVES    Etomidate ANAPHYLAXIS    Gabapentin OTHER (SEE COMMENTS)     swelling    Lexapro [Escitalopram] SWELLING     Legs and ankles    Mold WHEEZING    Pramipexole SWELLING    Pregabalin SWELLING    Quetiapine Fumarate HIVES, ITCHING and INSOMNIA    Dust OTHER (SEE COMMENTS)     Sneezing, sinus infection    Ropinirole OTHER (SEE COMMENTS)     Restless legs    Adhesive Tape RASH     Need to use surgery glue    Promethazine NAUSEA ONLY

## 2025-03-17 NOTE — PATIENT INSTRUCTIONS
- WE WILL CALL TO SCHEDULE YOUR CT SIMULATION FOR RADIATION PLANNING.       - IF YOU HAVE ANY QUESTIONS OR CONCERNS REGARDING RADIATION THERAPY, PLEASE CALL [phone].

## 2025-03-19 ENCOUNTER — HOSPITAL ENCOUNTER (OUTPATIENT)
Dept: RADIATION ONCOLOGY | Facility: HOSPITAL | Age: 65
Discharge: HOME | End: 2025-03-19
Attending: RADIOLOGY
Payer: MEDICARE

## 2025-03-20 ENCOUNTER — APPOINTMENT (OUTPATIENT)
Age: 65
End: 2025-03-20
Attending: INTERNAL MEDICINE
Payer: MEDICARE

## 2025-03-21 DIAGNOSIS — C54.1 ENDOMETRIAL CANCER, FIGO STAGE IIIC2 (HCC): Primary | ICD-10-CM

## 2025-03-21 NOTE — PROGRESS NOTES
Prior pelvic RT records obtained  No vaginal brachytherapy planned  There will be some overlap with prior RT field to include the lowest PET+ nodes  Avoiding overlap now then using SBRT later may actually create more cumulative overlap     Discussed with patient that there is a small volume of overlap and GI complication could occur but still better to treat rather than wait for local failure

## 2025-04-01 ENCOUNTER — HOSPITAL ENCOUNTER (OUTPATIENT)
Dept: RADIATION ONCOLOGY | Facility: HOSPITAL | Age: 65
Discharge: HOME | End: 2025-04-01
Attending: RADIOLOGY
Payer: MEDICARE

## 2025-04-01 PROCEDURE — 77386 HC IMRT COMPLEX: CPT | Performed by: RADIOLOGY

## 2025-04-02 PROCEDURE — 77386 HC IMRT COMPLEX: CPT | Performed by: RADIOLOGY

## 2025-04-02 NOTE — PROGRESS NOTES
[facility] Radiation Treatment Management Note 1-5    Patient:  Monica Holguin  Age:  65 year old  Visit Diagnosis:  No diagnosis found.  Primary Rad/Onc:  Dr. Tyree Guevara    Site Delivered Dose (cGy) Prescribed Dose (cGy) Fraction #   RP  5040 3/28     First treatment date:  4/1/25  Concurrent chemotherapy: N/A        2/27/2025     9:37 AM 3/17/2025     3:00 PM 4/3/2025    10:23 AM   Oncology Vitals   Height 5' 2.992\" 5' 3\"    Height 160 cm 160 cm    Weight 196 lb 8 oz 194 lb 6.4 oz 192 lb   Weight 89.132 kg 88.179 kg 87.091 kg   BSA (m2) 1.92 m2 1.91 m2 1.9 m2   BMI 34.82 kg/m2 34.44 kg/m2 34.01 kg/m2   /86 149/84 132/81   Pulse 87 69 84   Resp 16 20 18   Temp 97.5 °F (36.4 °C) 98.4 °F (36.9 °C) 98.4 °F (36.9 °C)   SpO2 97 % 98 % 97 %   Pain Score  4 0        Toxicities:  Fatigue Grade 2= Fatigue not relieved by rest limiting instrumental ADL  Nausea Grade 1= Loss of appetite without alteration in eating habits   Vomiting Grade 0= None  Flatulence Grade 0= None  Abdominal pain Grade 1= Mild pain       Nursing Note:  Tolerating RT  Has some upper abdominal pain- no change  On pantoprazole, reglan and zofran  Denies diarrhea    Patti QUESADA RN    Physician Note:  Subjective:  Third treatment today.  No issues related to RT.  Some stable abd discomfort.  No bowel issues.      Objective:  Unchanged      Treatment setup imaging have been reviewed:  Yes    Assessment/Plan:    Continue radiotherapy per plan    Next visit:  1 week    Dr. Jorge Jean-Baptiste

## 2025-04-03 ENCOUNTER — HOSPITAL ENCOUNTER (OUTPATIENT)
Dept: RADIATION ONCOLOGY | Facility: HOSPITAL | Age: 65
Discharge: HOME | End: 2025-04-03
Attending: RADIOLOGY
Payer: MEDICARE

## 2025-04-03 VITALS
DIASTOLIC BLOOD PRESSURE: 81 MMHG | RESPIRATION RATE: 18 BRPM | OXYGEN SATURATION: 97 % | TEMPERATURE: 98 F | HEART RATE: 84 BPM | BODY MASS INDEX: 34 KG/M2 | SYSTOLIC BLOOD PRESSURE: 132 MMHG | WEIGHT: 192 LBS

## 2025-04-03 DIAGNOSIS — C54.1 ENDOMETRIAL CANCER, FIGO STAGE IIIC2 (HCC): Primary | ICD-10-CM

## 2025-04-03 PROCEDURE — 77386 HC IMRT COMPLEX: CPT | Performed by: RADIOLOGY

## 2025-04-04 PROCEDURE — 77386 HC IMRT COMPLEX: CPT | Performed by: RADIOLOGY

## 2025-04-07 PROCEDURE — 77386 HC IMRT COMPLEX: CPT | Performed by: RADIOLOGY

## 2025-04-08 PROCEDURE — 77386 HC IMRT COMPLEX: CPT | Performed by: RADIOLOGY

## 2025-04-09 PROCEDURE — 77386 HC IMRT COMPLEX: CPT | Performed by: RADIOLOGY

## 2025-04-09 NOTE — PROGRESS NOTES
[facility] Radiation Treatment Management Note 6-10    Patient:  Monica Holguin  Age:  65 year old  Visit Diagnosis:    1. Endometrial cancer, FIGO stage IIIC2 (HCC)      Primary Rad/Onc:  Dr. Tyree Guevara    Site Delivered Dose (cGy) Prescribed Dose (cGy) Fraction #   RP LN 1440 5040 8/28     First treatment date:  4/1/25  Concurrent chemotherapy: N/A        4/3/2025    10:23 AM 4/10/2025     9:03 AM 4/10/2025     9:11 AM   Oncology Vitals   Weight 192 lb  193 lb 11.2 oz   Weight 87.091 kg  87.862 kg   BSA (m2) 1.9 m2  1.91 m2   BMI 34.01 kg/m2  34.31 kg/m2   /81 142/86    Pulse 84 75    Resp 18 16    Temp 98.4 °F (36.9 °C) 98.5 °F (36.9 °C)    SpO2 97 % 98 %    Pain Score 0        Toxicities:  Fatigue Grade 2= Fatigue not relieved by rest limiting instrumental ADL  Nausea Grade 1= Loss of appetite without alteration in eating habits   Vomiting Grade 0= None  Flatulence Grade 0= None  Abdominal pain Grade 0= None     Nursing Note:  Has some nausea- using zofran  Has diarrhea x2 the past week  Hasn't needed imodium   Denies pain    Patti QUESADA RN MD NOTE   Reviewed and agree with RN note above.  Setup imaging reviewed in [facility] and approved.    S:  -some nausea at times; tired; stressed out b/c of her sister getting heart surgery this week    O:  -no changes    A/P:  -zofran 1 hr pre RT      OTV in 1 week    Tyree Guevara MD  Radiation Oncology

## 2025-04-10 ENCOUNTER — OFFICE VISIT (OUTPATIENT)
Age: 65
End: 2025-04-10
Attending: INTERNAL MEDICINE
Payer: MEDICARE

## 2025-04-10 ENCOUNTER — HOSPITAL ENCOUNTER (OUTPATIENT)
Dept: RADIATION ONCOLOGY | Facility: HOSPITAL | Age: 65
Discharge: HOME | End: 2025-04-10
Attending: RADIOLOGY
Payer: MEDICARE

## 2025-04-10 ENCOUNTER — APPOINTMENT (OUTPATIENT)
Age: 65
End: 2025-04-10
Attending: INTERNAL MEDICINE
Payer: MEDICARE

## 2025-04-10 VITALS
DIASTOLIC BLOOD PRESSURE: 80 MMHG | SYSTOLIC BLOOD PRESSURE: 152 MMHG | OXYGEN SATURATION: 97 % | HEIGHT: 62.99 IN | HEART RATE: 77 BPM | WEIGHT: 192 LBS | BODY MASS INDEX: 34.02 KG/M2 | RESPIRATION RATE: 16 BRPM | TEMPERATURE: 98 F

## 2025-04-10 VITALS
TEMPERATURE: 99 F | HEART RATE: 75 BPM | DIASTOLIC BLOOD PRESSURE: 86 MMHG | WEIGHT: 193.69 LBS | SYSTOLIC BLOOD PRESSURE: 142 MMHG | BODY MASS INDEX: 34 KG/M2 | OXYGEN SATURATION: 98 % | RESPIRATION RATE: 16 BRPM

## 2025-04-10 DIAGNOSIS — R59.0 LYMPHADENOPATHY, RETROPERITONEAL: ICD-10-CM

## 2025-04-10 DIAGNOSIS — C54.1 ENDOMETRIAL CANCER, FIGO STAGE IIIC2 (HCC): Primary | ICD-10-CM

## 2025-04-10 DIAGNOSIS — Z51.12 ENCOUNTER FOR ANTINEOPLASTIC IMMUNOTHERAPY: ICD-10-CM

## 2025-04-10 DIAGNOSIS — E03.9 HYPOTHYROIDISM (ACQUIRED): ICD-10-CM

## 2025-04-10 LAB
ALBUMIN SERPL-MCNC: 4.7 G/DL (ref 3.2–4.8)
ALBUMIN/GLOB SERPL: 2.2 {RATIO} (ref 1–2)
ALP LIVER SERPL-CCNC: 71 U/L (ref 50–130)
ALT SERPL-CCNC: 18 U/L (ref 10–49)
ANION GAP SERPL CALC-SCNC: 8 MMOL/L (ref 0–18)
AST SERPL-CCNC: 16 U/L (ref ?–34)
BASOPHILS # BLD AUTO: 0.01 X10(3) UL (ref 0–0.2)
BASOPHILS NFR BLD AUTO: 0.4 %
BILIRUB SERPL-MCNC: 0.7 MG/DL (ref 0.2–1.1)
BUN BLD-MCNC: 14 MG/DL (ref 9–23)
CALCIUM BLD-MCNC: 10.6 MG/DL (ref 8.7–10.6)
CHLORIDE SERPL-SCNC: 106 MMOL/L (ref 98–112)
CO2 SERPL-SCNC: 27 MMOL/L (ref 21–32)
CREAT BLD-MCNC: 0.67 MG/DL (ref 0.55–1.02)
EGFRCR SERPLBLD CKD-EPI 2021: 97 ML/MIN/1.73M2 (ref 60–?)
EOSINOPHIL # BLD AUTO: 0.19 X10(3) UL (ref 0–0.7)
EOSINOPHIL NFR BLD AUTO: 8.1 %
ERYTHROCYTE [DISTWIDTH] IN BLOOD BY AUTOMATED COUNT: 12.8 %
GLOBULIN PLAS-MCNC: 2.1 G/DL (ref 2–3.5)
GLUCOSE BLD-MCNC: 101 MG/DL (ref 70–99)
HCT VFR BLD AUTO: 36.8 % (ref 35–48)
HGB BLD-MCNC: 12.6 G/DL (ref 12–16)
IMM GRANULOCYTES # BLD AUTO: 0.01 X10(3) UL (ref 0–1)
IMM GRANULOCYTES NFR BLD: 0.4 %
LYMPHOCYTES # BLD AUTO: 0.31 X10(3) UL (ref 1–4)
LYMPHOCYTES NFR BLD AUTO: 13.2 %
MCH RBC QN AUTO: 30.9 PG (ref 26–34)
MCHC RBC AUTO-ENTMCNC: 34.2 G/DL (ref 31–37)
MCV RBC AUTO: 90.2 FL (ref 80–100)
MONOCYTES # BLD AUTO: 0.2 X10(3) UL (ref 0.1–1)
MONOCYTES NFR BLD AUTO: 8.5 %
NEUTROPHILS # BLD AUTO: 1.63 X10 (3) UL (ref 1.5–7.7)
NEUTROPHILS # BLD AUTO: 1.63 X10(3) UL (ref 1.5–7.7)
NEUTROPHILS NFR BLD AUTO: 69.4 %
OSMOLALITY SERPL CALC.SUM OF ELEC: 293 MOSM/KG (ref 275–295)
PLATELET # BLD AUTO: 166 10(3)UL (ref 150–450)
POTASSIUM SERPL-SCNC: 4 MMOL/L (ref 3.5–5.1)
PROT SERPL-MCNC: 6.8 G/DL (ref 5.7–8.2)
RBC # BLD AUTO: 4.08 X10(6)UL (ref 3.8–5.3)
SODIUM SERPL-SCNC: 141 MMOL/L (ref 136–145)
T4 FREE SERPL-MCNC: 1.6 NG/DL (ref 0.8–1.7)
TSI SER-ACNC: 0.07 UIU/ML (ref 0.55–4.78)
WBC # BLD AUTO: 2.4 X10(3) UL (ref 4–11)

## 2025-04-10 PROCEDURE — 77386 HC IMRT COMPLEX: CPT | Performed by: RADIOLOGY

## 2025-04-10 RX ORDER — LEVOTHYROXINE SODIUM 88 UG/1
88 TABLET ORAL
Qty: 30 TABLET | Refills: 3 | Status: SHIPPED | OUTPATIENT
Start: 2025-04-10

## 2025-04-10 NOTE — PROGRESS NOTES
[facility]cer Center ANP Visit Note    Patient Name: Monica Holguin   YOB: 1960   Medical Record Number: [id number]   Date of visit: 4/10/2025     Chief Complaint/Reason for Visit:  Chief Complaint   Patient presents with    Follow - Up    Immunotherapy      Oncologic history:     The patient was initially diagnosed with anal cancer in 2013 when she complained of rectal bleeding and inability to use a tampon during her menstrual periods.  She was seen by gynecology, who detected a vaginal mass and referred her to GI for colonoscopy and biopsy.  She also had severe Fe deficiency anemia, at the time, with a hgb around 4.  She was treated with chemoRT by Dr. Berumen at [facility] in [place].  She recalls Mitomycin C and 5FU as the chemo agents. She had a complete response.      She was seen in the ER on 8/23/24 complaining of abdominal pain. CT revealed a large endometrial mass and prominent but not enlarged retroperitoneal nodes. She was referred back for evaluation. She was referred to gyn onc and saw Dr. Valdivia. On 9/26/24 she underwent an ex-lap, MONSE/BSO, periaortic lymph node dissection at [facility].   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 Tumor deposits were seen in the lymphatics. Tumor cells were positive for p16. The tumor involved the cervix and 3 of 5 periaortic nodes were positive for metastatic carcinoma. MMR was intact.  Stage IIIc2. Dr. Valdivia recommended 6 cycles of adjuvant Carbo/Taxol with a checkpoint inhibitor, followed by periaortic radiation.    History of Present Illness:     Monica Holguin is a 65 year old patient of Dr. Trujillo with the above oncologic history who is being treated with maintenance pembrolizumab for endometrial carcinoma. Presents today for evaluation prior to treatment.     Under significant stress, twin sister is needing open heart surgery in the next few days. She has some nausea from radiation. No changes to  neuropathy. Uses topical benadryl cream to occasional itchy spots. Specifically denies cough/dyspnea, chest pain/palpitations, and diarrhea.     Reviewed available chart notes, labs, and imaging.    History:     Past medical, surgical, social and family history reviewed with the patient and updated as appropriate in the chart.    Allergies:  Allergies[1]    Medications:  Medications - Current[2]    Review of Systems:  A comprehensive 14 point review of systems was completed.  Pertinent positives and negatives noted in the HPI.    Performance Status: ECOG 1    Vital Signs:   4/10/2025  10:05 AM   Oncology Vitals    Height 5' 2.992\"    Height 160 cm    Weight 192 lb    Weight 87.091 kg    BSA (m2) 1.9 m2    BMI 34.02 kg/m2    /80    Pulse 77    Resp 16    Temp 98.2 °F (36.8 °C)    SpO2 97 %      Physical Examination:  General: Well developed, casually dressed, appropriately groomed, alert and oriented x 3, not in acute distress.  HEENT: Anicteric, conjunctivae and sclerae clear, no oropharyngeal lesion/thrush, mucous membranes are moist.   Chest: Clear to auscultation, respirations unlabored.  Heart: Regular rate and rhythm, normal S1/2. No murmur.   Extremities: No edema.  Neurological: Grossly intact.   Skin: Warm, Dry, No erythema, No rash  Psych/Depression: mood and affect are appropriate.     Labs:     Recent Results (from the past 72 hours)   CBC W Differential W Platelet    Collection Time: 04/10/25 10:00 AM   Result Value Ref Range    WBC 2.4 (L) 4.0 - 11.0 x10(3) uL    RBC 4.08 3.80 - 5.30 x10(6)uL    HGB 12.6 12.0 - 16.0 g/dL    HCT 36.8 35.0 - 48.0 %    .0 150.0 - 450.0 10(3)uL    MCV 90.2 80.0 - 100.0 fL    MCH 30.9 26.0 - 34.0 pg    MCHC 34.2 31.0 - 37.0 g/dL    RDW 12.8 %    Neutrophil Absolute Prelim 1.63 1.50 - 7.70 x10 (3) uL    Neutrophil Absolute 1.63 1.50 - 7.70 x10(3) uL    Lymphocyte Absolute 0.31 (L) 1.00 - 4.00 x10(3) uL    Monocyte Absolute 0.20 0.10 - 1.00 x10(3) uL    Eosinophil  Absolute 0.19 0.00 - 0.70 x10(3) uL    Basophil Absolute 0.01 0.00 - 0.20 x10(3) uL    Immature Granulocyte Absolute 0.01 0.00 - 1.00 x10(3) uL    Neutrophil % 69.4 %    Lymphocyte % 13.2 %    Monocyte % 8.5 %    Eosinophil % 8.1 %    Basophil % 0.4 %    Immature Granulocyte % 0.4 %   Comp Metabolic Panel (14)    Collection Time: 04/10/25 10:00 AM   Result Value Ref Range    Glucose 101 (H) 70 - 99 mg/dL    Sodium 141 136 - 145 mmol/L    Potassium 4.0 3.5 - 5.1 mmol/L    Chloride 106 98 - 112 mmol/L    CO2 27.0 21.0 - 32.0 mmol/L    Anion Gap 8 0 - 18 mmol/L    BUN 14 9 - 23 mg/dL    Creatinine 0.67 0.55 - 1.02 mg/dL    Calcium, Total 10.6 8.7 - 10.6 mg/dL    Calculated Osmolality 293 275 - 295 mOsm/kg    eGFR-Cr 97 >=60 mL/min/1.73m2    AST 16 <34 U/L    ALT 18 10 - 49 U/L    Alkaline Phosphatase 71 50 - 130 U/L    Bilirubin, Total 0.7 0.2 - 1.1 mg/dL    Total Protein 6.8 5.7 - 8.2 g/dL    Albumin 4.7 3.2 - 4.8 g/dL    Globulin  2.1 2.0 - 3.5 g/dL    A/G Ratio 2.2 (H) 1.0 - 2.0    Patient Fasting for CMP? Patient not present    TSH [E]    Collection Time: 04/10/25 10:00 AM   Result Value Ref Range    TSH 0.069 (L) 0.550 - 4.780 uIU/mL   T4 FREE [E]    Collection Time: 04/10/25 10:00 AM   Result Value Ref Range    Free T4 1.6 0.8 - 1.7 ng/dL       Impression/Plan    Endometrial carcinoma: high grade clear cell and serous differentiation, Stage IIIC2 (T2N2M0). Completed 6 cycles of adjuvant chemoIO, now on maintenance pembrolizumab. Receiving consolidative RT to RP lymph nodes. Labs reviewed, proceed with treatment today as planned.      Hypothyroidism: TSH today is suppresed, with borderline elevated free T4, will reduce dose of levothyroxine slightly to 88 mcg daily. New prescription sent to pharmacy.     Planned Follow Up: 6 weeks for MD + labs + IO    Risk Level: HIGH - endometrial carcinoma receiving immunotherapy requiring intervention and close monitoring     The 21st Century Cures Act makes medical notes like  these available to patients in the interest of transparency. Please be advised this is a medical document. Medical documents are intended to carry relevant information, facts as evident, and the clinical opinion of the practitioner. The medical note is intended as peer to peer communication and may appear blunt or direct. It is written in medical language and may contain abbreviations or verbiage that are unfamiliar.     Electronically Signed by:    Hailey Fierro, USMAN, NP-C  Nurse Practitioner  [facility]       [1]   Allergies  Allergen Reactions    Chlorhexidine HIVES    Citalopram HIVES    Etomidate ANAPHYLAXIS    Gabapentin OTHER (SEE COMMENTS)     swelling    Lexapro [Escitalopram] SWELLING     Legs and ankles    Mold WHEEZING    Pramipexole SWELLING    Pregabalin SWELLING    Quetiapine Fumarate HIVES, ITCHING and INSOMNIA    Dust OTHER (SEE COMMENTS)     Sneezing, sinus infection    Ropinirole OTHER (SEE COMMENTS)     Restless legs    Adhesive Tape RASH     Need to use surgery glue    Promethazine NAUSEA ONLY   [2]   Current Outpatient Medications:     levothyroxine 88 MCG Oral Tab, Take 1 tablet (88 mcg total) by mouth before breakfast., Disp: 30 tablet, Rfl: 3    pantoprazole 40 MG Oral Tab EC, Take 1 tablet (40 mg total) by mouth 2 (two) times daily before meals., Disp: 180 tablet, Rfl: 1    Wheat Dextrin (BENEFIBER OR), Take by mouth., Disp: , Rfl:     metoclopramide 10 MG Oral Tab, Take 1 tablet (10 mg total) by mouth 4 (four) times daily as needed (nausea)., Disp: 60 tablet, Rfl: 3    ondansetron 8 MG Oral Tablet Dispersible, Take 1 tablet (8 mg total) by mouth every 8 (eight) hours as needed for Nausea., Disp: 30 tablet, Rfl: 3    lidocaine-prilocaine 2.5-2.5 % External Cream, Apply to site 1 hour prior to port a cath needle insertion, Disp: 1 each, Rfl: 0    Gabapentin Enacarbil ER (HORIZANT) 300 MG Oral Tab CR, Take 900 mg by mouth in the morning., Disp: , Rfl:

## 2025-04-10 NOTE — PROGRESS NOTES
Chief Complaint   Patient presents with    Follow - Up    Immunotherapy     Pt is here for treatment - C8 D1 keytruda also getting RT. Eating and drinking ok; continued nausea, no vomiting; IBS constipation and occasional diarrhea. Neuropathy in feet/shins/occasional in fingers. Rash and itching to legs, chest and face; scalp irritation under wig. 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    Education Record    Learner:  Patient    Disease / Diagnosis: endometrial cancer    Barriers / Limitations:  None   Comments:    Method:  Brief focused   Comments:    General Topics:  Diet, Medication, Pain, Side effects and symptom management, and Plan of care reviewed   Comments:    Outcome:  Shows understanding   Comments:

## 2025-04-10 NOTE — PROGRESS NOTES
Pt here for C8D1 Drug(s)keytruda.  Arrives Ambulating independently, accompanied by Self     Patient was evaluated today by MADIHA.    Oral medications included in this regimen:  no    Patient confirms comprehension of cancer treatment schedule:  yes    Pregnancy screening:  Denies possibility of pregnancy    Modifications in dose or schedule:  No    Medications appearance and physical integrity checked by RN: yes.    Chemotherapy IV pump settings verified by 2 RNs:  No due to targeted therapy IV administration.  Frequency of blood return and site check throughout administration: Prior to administration and At completion of therapy     Infusion/treatment outcome:  patient tolerated treatment without incident    Education Record    Learner:  Patient  Barriers / Limitations:  None  Method:  Discussion  Education / instructions given:  plan of care  Outcome:  Shows understanding    Discharged Home, Ambulating independently, accompanied by:Self    Patient/family verbalized understanding of future appointments: by [employer] messaging    Patient arrives ambulatory for labs, APN, and treatment. Patient aware dose of levothyroxine decreased and to start new prescription tomorrow. Patient tolerated treatment well and left in stable condition. Future appointment at [place] location per patient request.

## 2025-04-11 PROCEDURE — 77336 RADIATION PHYSICS CONSULT: CPT | Performed by: RADIOLOGY

## 2025-04-11 PROCEDURE — 77386 HC IMRT COMPLEX: CPT | Performed by: RADIOLOGY

## 2025-04-14 PROCEDURE — 77386 HC IMRT COMPLEX: CPT | Performed by: RADIOLOGY

## 2025-04-14 NOTE — PROGRESS NOTES
Oncology Nutrition Consultation    Patient Name: Monica Holguin  YOB: 1960  Medical Record Number: [id number]   Account Number: [id number]  Dietitian: Layne Archibald RD, FRANCOISN    Date of visit: 4/10/2025    Diet Rx: high protein/quality as tolerated; low residue pending diarrhea    Pertinent Dx/PMH: Endometrial cancer, FIGO stage IIIC2     Past Medical History[1]    TX: concurrent Pembrolizumab (immunotherapy)/RT     Other pertinent subjective/objective information: diet hx obtained; Completed 6 cycles of adjuvant chemoIO     Pertinent Meds:  Medications - Current[2]    Pertinent Labs: noted    Height: 5'3\"            IBW: 115 +/- 10%    WT HX:   Wt Readings from Last 9 Encounters:   04/10/25 87.9 kg (193 lb 11.2 oz)   04/10/25 87.1 kg (192 lb)   04/03/25 87.1 kg (192 lb)   03/17/25 88.2 kg (194 lb 6.4 oz)   02/27/25 89.1 kg (196 lb 8 oz)   02/06/25 89.8 kg (198 lb)   01/16/25 88 kg (194 lb)   12/26/24 86.6 kg (191 lb)   12/05/24 86.2 kg (190 lb)       Estimated Nutrition Needs: 20-22 kcals/kg = 7897-2271 KCALS/d; 1.3 gms protein/kg =  115 gms/d    Services Provided: Verbal and written ix provided addressing -  importance of nutrition during tx; potential nutrition related side effects of tx and ways to address; (sample of [employer]/coupons)    Assessment/Plan: RD met w/ this pleasant, talkative, 64 y/o female in tx area for introduction, assessment and recommendations. Pt noted tolerating tx thus far.     Diet hx revealed skipping breakfast, having a light lunch and balanced dinner. Pt expressed concern w/ wt gain. Pt noted twin sister having CABG and is concerned about her.     RD reviewed recommendations as noted stressing importance of adequate nutrition to ensure maintenance of lean tissue. RD encouraged having a minimum of 3 balanced meals/d w/ protein source q meal; RD reviewed use of ONS if pt finds more convenient. Pt actively participated throughout verbalizing understanding of recommendations  made. RD offered support and will continue to monitor for sx of diarrhea w/ RT.     Thank you for allowing me to participate in the care of Monica.     The 21st Century Cures Act makes medical notes like these available to patients in the interest of transparency. Please be advised this is a medical document. Medical documents are intended to carry relevant information, facts as evident, and the clinical opinion of the practitioner. The medical note is intended as peer to peer communication and may appear blunt or direct. It is written in medical language and may contain abbreviations or verbiage that are unfamiliar.         [1]   Past Medical History:   Anal cancer (HCC)    Anal cancer (HCC)    Anesthesia complication    slow to arouse    Anxiety state    Asthma (HCC)    excerise induced    Back problem    upper back pain from radiation    Bipolar disorder (HCC)    Bloating    Blood disorder    anemia resolved    Constipation    Years    Depression    Disorder of thyroid    Exposure to radiation    last dose February 2014    Fatigue    Fibromyalgia    Frequent urination    Headache disorder    Neck problems    History of blood transfusion    History of depression    Hx of motion sickness    Hypothyroidism (acquired)    Dx in 8/2017    Irregular bowel habits    Leaking of urine    After surgery    Migraines    in the past    Nausea    Gallbladder attacks    Neoplasm, breast    Pain in joints    Chemo    Personal history of antineoplastic chemotherapy     last dose 2/2014, patient also completed radiation therapy 2014    PONV (postoperative nausea and vomiting)    Presence of other cardiac implants and grafts    Left ankle    Restless leg syndrome    Restless leg syndrome    Sleep disturbance    Stress    Uncomfortable fullness after meals    Uterine cancer (HCC)    Visual impairment    glasses,contacts    Wears glasses   [2]   Current Outpatient Medications:     levothyroxine 88 MCG Oral Tab, Take 1 tablet (88 mcg  total) by mouth before breakfast., Disp: 30 tablet, Rfl: 3    pantoprazole 40 MG Oral Tab EC, Take 1 tablet (40 mg total) by mouth 2 (two) times daily before meals., Disp: 180 tablet, Rfl: 1    Wheat Dextrin (BENEFIBER OR), Take by mouth., Disp: , Rfl:     metoclopramide 10 MG Oral Tab, Take 1 tablet (10 mg total) by mouth 4 (four) times daily as needed (nausea)., Disp: 60 tablet, Rfl: 3    ondansetron 8 MG Oral Tablet Dispersible, Take 1 tablet (8 mg total) by mouth every 8 (eight) hours as needed for Nausea., Disp: 30 tablet, Rfl: 3    lidocaine-prilocaine 2.5-2.5 % External Cream, Apply to site 1 hour prior to port a cath needle insertion, Disp: 1 each, Rfl: 0    Gabapentin Enacarbil ER (HORIZANT) 300 MG Oral Tab CR, Take 900 mg by mouth in the morning., Disp: , Rfl:

## 2025-04-15 PROCEDURE — 77386 HC IMRT COMPLEX: CPT | Performed by: RADIOLOGY

## 2025-04-16 PROCEDURE — 77386 HC IMRT COMPLEX: CPT | Performed by: RADIOLOGY

## 2025-04-16 NOTE — PROGRESS NOTES
[facility] Radiation Treatment Management Note 11-15    Patient:  Monica Holguin  Age:  65 year old  Visit Diagnosis:  No diagnosis found.  Primary Rad/Onc:  Dr. Tyree Guevara    Site Delivered Dose (cGy) Prescribed Dose (cGy) Fraction #   RP LN [id number] 5040 13/28     First treatment date:  4/1/25  Concurrent chemotherapy: N/A        4/10/2025    10:05 AM 4/17/2025    10:13 AM 4/17/2025    10:15 AM   Oncology Vitals   Height 5' 2.992\"     Height 160 cm     Weight 192 lb  189 lb 9.6 oz   Weight 87.091 kg  86.002 kg   BSA (m2) 1.9 m2  1.89 m2   BMI 34.02 kg/m2  33.59 kg/m2   /80 130/86    Pulse 77 95    Resp 16 18    Temp 98.2 °F (36.8 °C) 98.2 °F (36.8 °C)    SpO2 97 % 98 %    Pain Score  0         Toxicities:  Fatigue Grade 2= Fatigue not relieved by rest limiting instrumental ADL  Nausea Grade 1= Loss of appetite without alteration in eating habits   Vomiting Grade 0= None  Flatulence Grade 1= Mild symptoms; intervention not indicated  Abdominal pain Grade 0= None       Nursing Note:  Started having cough,sore throat, chest pressure and headache 4/15/25  Has some nausea- uses zofran and reglan  No diarrhea    Patti QUESADA RN MD NOTE   Reviewed and agree with RN note above.  Setup imaging reviewed in ARIA and approved.    S:  -uri symptoms, low grade temp; no diarrhea, nausea controlled with reglan/zofran, no abd pain    O:  -  Vitals:    04/17/25 1013   BP: 130/86   Pulse: 95   Resp: 18   Temp: 98.2 °F (36.8 °C)       A/P:  -hold RT until feeling better  -urgent care to evaluate uri (r/o covid, strep throat)  -resume next Mon    OTV in 1 week    Tyree Guevara MD  Radiation Oncology

## 2025-04-17 ENCOUNTER — APPOINTMENT (OUTPATIENT)
Dept: GENERAL RADIOLOGY | Age: 65
End: 2025-04-17
Attending: NURSE PRACTITIONER
Payer: MEDICARE

## 2025-04-17 ENCOUNTER — HOSPITAL ENCOUNTER (OUTPATIENT)
Age: 65
Discharge: HOME | End: 2025-04-17
Payer: MEDICARE

## 2025-04-17 ENCOUNTER — HOSPITAL ENCOUNTER (OUTPATIENT)
Dept: RADIATION ONCOLOGY | Facility: HOSPITAL | Age: 65
Discharge: HOME | End: 2025-04-17
Attending: RADIOLOGY
Payer: MEDICARE

## 2025-04-17 VITALS
TEMPERATURE: 98 F | OXYGEN SATURATION: 98 % | RESPIRATION RATE: 18 BRPM | BODY MASS INDEX: 34 KG/M2 | DIASTOLIC BLOOD PRESSURE: 86 MMHG | HEART RATE: 95 BPM | SYSTOLIC BLOOD PRESSURE: 130 MMHG | WEIGHT: 189.63 LBS

## 2025-04-17 VITALS
SYSTOLIC BLOOD PRESSURE: 150 MMHG | HEART RATE: 103 BPM | HEIGHT: 63 IN | TEMPERATURE: 98 F | OXYGEN SATURATION: 98 % | BODY MASS INDEX: 32.78 KG/M2 | WEIGHT: 185 LBS | RESPIRATION RATE: 18 BRPM | DIASTOLIC BLOOD PRESSURE: 88 MMHG

## 2025-04-17 DIAGNOSIS — J11.1 INFLUENZA: ICD-10-CM

## 2025-04-17 DIAGNOSIS — C54.1 ENDOMETRIAL CANCER, FIGO STAGE IIIC2 (HCC): Primary | ICD-10-CM

## 2025-04-17 DIAGNOSIS — R05.9 COUGH: Primary | ICD-10-CM

## 2025-04-17 LAB
POCT INFLUENZA A: NEGATIVE
POCT INFLUENZA B: POSITIVE
SARS-COV-2 RNA RESP QL NAA+PROBE: NOT DETECTED

## 2025-04-17 PROCEDURE — U0002 COVID-19 LAB TEST NON-CDC: HCPCS | Performed by: NURSE PRACTITIONER

## 2025-04-17 PROCEDURE — 77386 HC IMRT COMPLEX: CPT | Performed by: RADIOLOGY

## 2025-04-17 PROCEDURE — 87502 INFLUENZA DNA AMP PROBE: CPT | Performed by: NURSE PRACTITIONER

## 2025-04-17 PROCEDURE — 71046 X-RAY EXAM CHEST 2 VIEWS: CPT | Performed by: NURSE PRACTITIONER

## 2025-04-17 PROCEDURE — 99214 OFFICE O/P EST MOD 30 MIN: CPT | Performed by: NURSE PRACTITIONER

## 2025-04-17 RX ORDER — BENZONATATE 100 MG/1
100 CAPSULE ORAL 3 TIMES DAILY PRN
Qty: 30 CAPSULE | Refills: 0 | Status: SHIPPED | OUTPATIENT
Start: 2025-04-17 | End: 2025-05-17

## 2025-04-17 RX ORDER — OSELTAMIVIR PHOSPHATE 75 MG/1
75 CAPSULE ORAL 2 TIMES DAILY
Qty: 10 CAPSULE | Refills: 0 | Status: SHIPPED | OUTPATIENT
Start: 2025-04-17 | End: 2025-04-22

## 2025-04-17 NOTE — ED INITIAL ASSESSMENT (HPI)
Patient reports cough, congestion, and subjective fever x 2 days, pt currently receiving radiation for uterine cancer and just had a treatment prior to arrival, intermittent SOB

## 2025-04-17 NOTE — ED PROVIDER NOTES
Patient Seen in: [facility]      History     Chief Complaint   Patient presents with    Cough/URI    Fever     Stated Complaint: Cough; fever    Subjective:   HPI    Patient is a 65-year-old female who is here today with complaints of cough, congestion, subjective fevers for the past 2 days.  She went to radiation today for uterine cancer and had a treatment just prior to arrival.  She reports that she has that intermittent shortness of breath however this started with a cough and congestion.  She has been eating and drinking without difficulty    History of Present Illness               Objective:     Past Medical History:    Anal cancer (HCC)    Anal cancer (HCC)    Anesthesia complication    slow to arouse    Anxiety state    Asthma (HCC)    excerise induced    Back problem    upper back pain from radiation    Bipolar disorder (HCC)    Bloating    Blood disorder    anemia resolved    Constipation    Years    Depression    Disorder of thyroid    Exposure to radiation    last dose 2014    Fatigue    Fibromyalgia    Frequent urination    Headache disorder    Neck problems    History of blood transfusion    History of depression    Hx of motion sickness    Hypothyroidism (acquired)    Dx in 2017    Irregular bowel habits    Leaking of urine    After surgery    Migraines    in the past    Nausea    Gallbladder attacks    Neoplasm, breast    Pain in joints    Chemo    Personal history of antineoplastic chemotherapy     last dose 2014, patient also completed radiation therapy     PONV (postoperative nausea and vomiting)    Presence of other cardiac implants and grafts    Left ankle    Restless leg syndrome    Restless leg syndrome    Sleep disturbance    Stress    Uncomfortable fullness after meals    Uterine cancer (HCC)    Visual impairment    glasses,contacts    Wears glasses              Past Surgical History:   Procedure Laterality Date          Colonoscopy N/A 11/10/2017     Procedure: COLONOSCOPY;  Surgeon: Ayan Ayon DO;  Location:  ENDOSCOPY    Colonoscopy N/A 12/05/2018    Procedure: COLONOSCOPY, POSSIBLE BIOPSY, POSSIBLE POLYPECTOMY 35970;  Surgeon: Ayan Ayon DO;  Location: [facility]    Hysterectomy      Estrada biopsy stereo nodule 1 site right (cpt=19081)      benign     Estrada biopsy stereo nodule 2 site bilat (cpt=19081/67438)      benign     Estrada localization wire 1 site left (cpt=19281)      Estrada localization wire 1 site right (cpt=19281)      Other      left knee surgery- allograft    Other      left ankle fracture     Other  08/2017    Anal exam under anesthesia    Other surgical history  10/2020    Port removal     Port, indwelling, imp      Sigmoidoscopy,diagnostic      Tonsillectomy  1963    Total abdom hysterectomy  9/26/24                Social History     Socioeconomic History    Marital status:     Number of children: 3   Tobacco Use    Smoking status: Never    Smokeless tobacco: Never    Tobacco comments:     non-smoker   Vaping Use    Vaping status: Never Used   Substance and Sexual Activity    Alcohol use: Yes     Comment: rare    Drug use: No    Sexual activity: Not Currently     Partners: Male   Other Topics Concern    Caffeine Concern Yes     Comment: 1 cup coffee per day    Exercise Yes     Comment: walking    Seat Belt Yes   Social History Narrative    , lives with     Has 2 sons, 1 daughter - all living in same state     Social Drivers of Health     Food Insecurity: Patient Declined (4/2/2025)    Received from [facility]    Hunger Vital Sign     Worried About Running Out of Food in the Last Year: Patient declined     Ran Out of Food in the Last Year: Patient declined   Transportation Needs: No Transportation Needs (4/2/2025)    Received from [facility]    PRAPARE - Transportation     Lack of Transportation (Medical): No     Lack of Transportation (Non-Medical): No   Housing Stability:  Unknown (4/2/2025)    Received from [facility]    Housing Stability Vital Sign     Unable to Pay for Housing in the Last Year: No              Review of Systems    Positive for stated complaint: Cough; fever  Other systems are as noted in HPI.  Constitutional and vital signs reviewed.      All other systems reviewed and negative except as noted above.                  Physical Exam     ED Triage Vitals [04/17/25 1134]   /88   Pulse 103   Resp 18   Temp 98.3 °F (36.8 °C)   Temp src Oral   SpO2 98 %   O2 Device None (Room air)       Current Vitals:   Vital Signs  BP: 150/88  Pulse: 103  Resp: 18  Temp: 98.3 °F (36.8 °C)  Temp src: Oral    Oxygen Therapy  SpO2: 98 %  O2 Device: None (Room air)        Physical Exam  VS: Vital signs reviewed. O2 saturation within normal limits for this patient     General: Patient is awake and alert, oriented to person, place and time. Not in acute distress.      HEENT: Head is normocephalic atraumatic. Pupils reactive bilaterally.  EOMs intact.  No facial droop or slurred speech.  No oral pallor. Mucous membranes moist.      Neck: No cervical lymphadenopathy. No stridor. Supple. No meningsmus.      Heart: S1-S2.  Regular rate and rhythm.       Lungs: good inspiratory effort. +air entry bilaterally without wheezes, rhonchi, crackles.  No accessory muscle use or tachypnea.       Abdomen: Soft, nontender, nondistended.  Active bowel sounds present.       Back: No CVA tenderness.     Extremities: No edema.  Pulses 2+ extremities.   Brisk capillary refill noted.      Skin: Normal skin turgor     CNS: Moves all 4 extremities.  Interacts appropriately.  No tremor.  No gait abnormality      Physical Exam                ED Course     Labs Reviewed   POCT FLU TEST - Abnormal; Notable for the following components:       Result Value    POCT INFLUENZA B Positive (*)     All other components within normal limits    Narrative:     This assay is a rapid molecular in vitro  test utilizing nucleic acid amplification of influenza A and B viral RNA.   RAPID SARS-COV-2 BY PCR - Normal     I have personally  reviewed available prior medical records for any recent pertinent discharge summaries/testing. Patient/family updated on results and plan, a verbalized understanding and agreement with the plan.  I explained to the patient that emergent conditions may arise and to go to the ER for new, worsening or any persistent conditions. I've explained the importance of taking all medicatons as prescribed, follow up, and return precuations,  All questions answered.    Please note that this report has been produced using speech recognition software and may contain errors related to that system including, but not limited to, errors in grammar, punctuation, and spelling, as well as words and phrases that possibly may have been recognized inappropriately.  If there are any questions or concerns, contact the dictating provider for clarification.                     MDM      Patient presents with flulike symptoms.  Positive fevers, body aches, sinus congestion, cough and malaise.  Positive sick contacts at home.  No respiratory distress, oxygen saturation within normal limits for the patient. Chest x-ray negative for evidence of pneumonia. Patient is febrile but is nontoxic and does not meet SIRS criteria.  Tolerating oral intake, does not appear clinically dehydrated. Influenza test is positive. immunocompromised. No underlying pulmonary diseases.  Tamiflu and Tessalon Perles prescribed recommend follow-up with PCP as needed. Counseled on supportive care. Return to ED precautions discussed with the patient          Medical Decision Making      Disposition and Plan     Clinical Impression:  1. Cough    2. Influenza         Disposition:  Discharge  4/17/2025 12:10 pm    Follow-up:  Aimee Chappell MD  [address] [address]  [phone]                Medications Prescribed:  Discharge  Medication List as of 4/17/2025 12:12 PM        START taking these medications    Details   oseltamivir (TAMIFLU) 75 MG Oral Cap Take 1 capsule (75 mg total) by mouth 2 (two) times daily for 5 days., Normal, Disp-10 capsule, R-0      benzonatate 100 MG Oral Cap Take 1 capsule (100 mg total) by mouth 3 (three) times daily as needed for cough., Normal, Disp-30 capsule, R-0             Supplementary Documentation:

## 2025-04-17 NOTE — DISCHARGE INSTRUCTIONS
Influenza adult:     -Oseltamivir (Tamiflu) as prescribed. May cause nausea and vomiting. If this occurs, you may discontinue use and discuss ongoing management with your primary care provider.     -Acetaminophen 650 mg orally every 4 hours as needed for pain.  Do not exceed 4000 mg in 24 hours.     -Use medications for minimal duration necessary.     -Over-the-counter nasal saline.  1-2 drops to the affected nostril as needed.       -Cool air humidifier.     -See teaching materials on influenza (packet printed).     -Follow-up with the primary care provider within 3 days for reassessment or sooner if necessary.     -Please monitor for and seek medical attention with fever lasting more than 48 hours, difficulties breathing, increased work of breathing, shortness of breath, chset pain, abdominal pain, persistent vomiting and diarrhea with concerns for dehydration, or any other concerns.

## 2025-04-18 PROCEDURE — 77336 RADIATION PHYSICS CONSULT: CPT | Performed by: RADIOLOGY

## 2025-04-21 PROCEDURE — 77386 HC IMRT COMPLEX: CPT | Performed by: RADIOLOGY

## 2025-04-22 PROCEDURE — 77386 HC IMRT COMPLEX: CPT | Performed by: RADIOLOGY

## 2025-04-23 PROCEDURE — 77386 HC IMRT COMPLEX: CPT | Performed by: RADIOLOGY

## 2025-04-23 NOTE — PROGRESS NOTES
[facility] Radiation Treatment Management Note 16-20    Patient:  Monica Holguin  Age:  65 year old  Visit Diagnosis:    1. Endometrial cancer, FIGO stage IIIC2 (HCC)      Primary Rad/Onc:  Dr. Tyree Guevara    Site Delivered Dose (cGy) Prescribed Dose (cGy) Fraction #   RP LN [id number] 5040 17/28     First treatment date:  4/1/25  Concurrent chemotherapy: N/A        4/17/2025    10:15 AM 4/17/2025    11:34 AM 4/24/2025    10:09 AM   Oncology Vitals   Height  5' 3\"    Height  160 cm    Weight 189 lb 9.6 oz 185 lb 187 lb 3.2 oz   Weight 86.002 kg 83.915 kg 84.913 kg   BSA (m2) 1.89 m2 1.87 m2 1.88 m2   BMI 33.59 kg/m2 32.77 kg/m2 33.16 kg/m2   BP  150/88 112/75   Pulse  103 63   Resp  18 18   Temp  98.3 °F (36.8 °C) 97.7 °F (36.5 °C)   SpO2  98 % 98 %   Pain Score   0        Toxicities:  Fatigue Grade 2= Fatigue not relieved by rest limiting instrumental ADL  Nausea Grade 1= Loss of appetite without alteration in eating habits   Vomiting Grade 0= None  Flatulence Grade 1= Mild symptoms; intervention not indicated  Abdominal pain Grade 0= None     Nursing Note:  Has intermittent diarrhea- uses imodium with relief  Has nausea- using zofran and reglan with some relief  Recovering from flu B from last week  No pain    Patti QUESADA RN MD NOTE   Reviewed and agree with RN note above.  Setup imaging reviewed in ARIA and approved.    S:  -had flu last week; cough better; took tamiflu which worsened her diarrhea; but not more than a few Imodium used; better now, afebrile, occ cough    O:  -no fever; looks well    A/P:  -Imodium prn    OTV in 1 week    Tyree Guevara MD  Radiation Oncology

## 2025-04-24 ENCOUNTER — HOSPITAL ENCOUNTER (OUTPATIENT)
Dept: RADIATION ONCOLOGY | Facility: HOSPITAL | Age: 65
Discharge: HOME | End: 2025-04-24
Attending: RADIOLOGY
Payer: MEDICARE

## 2025-04-24 VITALS
RESPIRATION RATE: 18 BRPM | HEART RATE: 63 BPM | OXYGEN SATURATION: 98 % | WEIGHT: 187.19 LBS | DIASTOLIC BLOOD PRESSURE: 75 MMHG | SYSTOLIC BLOOD PRESSURE: 112 MMHG | TEMPERATURE: 98 F | BODY MASS INDEX: 33 KG/M2

## 2025-04-24 DIAGNOSIS — C54.1 ENDOMETRIAL CANCER, FIGO STAGE IIIC2 (HCC): Primary | ICD-10-CM

## 2025-04-24 PROCEDURE — 77386 HC IMRT COMPLEX: CPT | Performed by: RADIOLOGY

## 2025-04-25 PROCEDURE — 77386 HC IMRT COMPLEX: CPT | Performed by: RADIOLOGY

## 2025-04-25 PROCEDURE — 77336 RADIATION PHYSICS CONSULT: CPT | Performed by: RADIOLOGY

## 2025-04-28 PROCEDURE — 77386 HC IMRT COMPLEX: CPT | Performed by: RADIOLOGY

## 2025-04-29 PROCEDURE — 77386 HC IMRT COMPLEX: CPT | Performed by: RADIOLOGY

## 2025-04-30 PROCEDURE — 77386 HC IMRT COMPLEX: CPT | Performed by: RADIOLOGY

## 2025-05-01 ENCOUNTER — HOSPITAL ENCOUNTER (OUTPATIENT)
Dept: RADIATION ONCOLOGY | Facility: HOSPITAL | Age: 65
Discharge: HOME | End: 2025-05-01
Attending: RADIOLOGY
Payer: MEDICARE

## 2025-05-01 ENCOUNTER — APPOINTMENT (OUTPATIENT)
Age: 65
End: 2025-05-01
Attending: INTERNAL MEDICINE
Payer: MEDICARE

## 2025-05-01 VITALS
TEMPERATURE: 98 F | BODY MASS INDEX: 33 KG/M2 | WEIGHT: 185.19 LBS | DIASTOLIC BLOOD PRESSURE: 80 MMHG | HEART RATE: 60 BPM | OXYGEN SATURATION: 97 % | SYSTOLIC BLOOD PRESSURE: 122 MMHG | RESPIRATION RATE: 20 BRPM

## 2025-05-01 DIAGNOSIS — C54.1 ENDOMETRIAL CANCER, FIGO STAGE IIIC2 (HCC): Primary | ICD-10-CM

## 2025-05-01 PROCEDURE — 77386 HC IMRT COMPLEX: CPT | Performed by: RADIOLOGY

## 2025-05-01 NOTE — PROGRESS NOTES
[facility] Radiation Treatment Management Note 21-25    Patient:  Monica Holguin  Age:  65 year old  Visit Diagnosis:    1. Endometrial cancer, FIGO stage IIIC2 (HCC)      Primary Rad/Onc:  Dr. Tyree Guevara    Site Delivered Dose (cGy) Prescribed Dose (cGy) Fraction #   RP LN [id number] 5040 22/28     First treatment date:   4/1/25  Concurrent chemotherapy:  N/A        4/17/2025    11:34 AM 4/24/2025    10:09 AM 5/1/2025     9:27 AM   Oncology Vitals   Height 5' 3\"     Height 160 cm     Weight 185 lb 187 lb 3.2 oz 185 lb 3.2 oz   Weight 83.915 kg 84.913 kg 84.006 kg   BSA (m2) 1.87 m2 1.88 m2 1.87 m2   BMI 32.77 kg/m2 33.16 kg/m2 32.81 kg/m2   /88 112/75 122/80   Pulse 103 63 60   Resp 18 18 20   Temp 98.3 °F (36.8 °C) 97.7 °F (36.5 °C) 98.3 °F (36.8 °C)   SpO2 98 % 98 % 97 %   Pain Score  0 5        Toxicities:  Fatigue Grade 1= Fatigue relieved by rest  Constipation Grade 0= None  Diarrhea  Grade 1= Increase of <4 stools per day over baseline; mild increase in ostomy output compared to baseline   Flatulence Grade 0= None  Vaginal bleeding Grade 0= None  Urinary frequency Grade 0= None  Dysuria on urination Grade 0= None  Urgency on urination Grade 1= Present  Dermatitis associated with radiation Grade 0= None  Moisturizer used Vanicream applied Number of times: 1 times daily.    Nursing Note:  Tolerating RT well.  States had a few episodes of diarrhea since last week, 2x this AM.  Took Imodium 1 tab this AM.  Denies rectal irritation.   Appetite decreased, no nausea.  Denies abd pains/cramping.    Brianda QUESADA RN MD NOTE   Reviewed and agree with RN note above.  Setup imaging reviewed in ARIA and approved.    S:  -some episodes of diarrhea, controlled w/ Imodium  -still eating and maintaining weight    O:  -185#  Vitals good    A/P:  -cont abd RT    OTV in 1 week    Tyree Guevara MD  Radiation Oncology

## 2025-05-02 PROCEDURE — 77336 RADIATION PHYSICS CONSULT: CPT | Performed by: RADIOLOGY

## 2025-05-02 PROCEDURE — 77386 HC IMRT COMPLEX: CPT | Performed by: RADIOLOGY

## 2025-05-07 NOTE — PROGRESS NOTES
[facility] Radiation Treatment Management Note 26-30    Patient:  Monica Holguin  Age:  65 year old  Visit Diagnosis:    1. Endometrial cancer, FIGO stage IIIC2 (HCC)      Primary Rad/Onc:  Dr. Tyree Guevara    Site Delivered Dose (cGy) Prescribed Dose (cGy) Fraction #   RP LN [id number] 5040 27/28     First treatment date:  4/1/25  Concurrent chemotherapy: N/A        4/24/2025    10:09 AM 5/1/2025     9:27 AM 5/8/2025    10:08 AM   Oncology Vitals   Weight 187 lb 3.2 oz 185 lb 3.2 oz 187 lb 12.8 oz   Weight 84.913 kg 84.006 kg 85.186 kg   BSA (m2) 1.88 m2 1.87 m2 1.88 m2   BMI 33.16 kg/m2 32.81 kg/m2 33.27 kg/m2   /75 122/80 134/76   Pulse 63 60 77   Resp 18 20 16   Temp 97.7 °F (36.5 °C) 98.3 °F (36.8 °C) 97.9 °F (36.6 °C)   SpO2 98 % 97 % 97 %   Pain Score 0 5 5      Toxicities:  Fatigue Grade 2= Fatigue not relieved by rest limiting instrumental ADL  Nausea Grade 1= Loss of appetite without alteration in eating habits   Vomiting Grade 0= None  Flatulence Grade 1= Mild symptoms; intervention not indicated  Abdominal pain Grade 1= Mild pain     Nursing Note:  No diarrhea today  When she gets diarrhea- uses 2 imodium with relief  Has dull ache in abdomen -3/10  Using zofran and reglan for nausea with relief  C/o lower back pain- 5/10  Sitting aggravates the back pain  Lying down relieves pain    Patti QUESADA RN MD NOTE   Reviewed and agree with RN note above.  Setup imaging reviewed in ARIA and approved.    S:  -mid thoracic back pain after exertion, resolves by morning/rest    O:  -no changes    A/P:  -finish out RT to RPLNs  F/u in 3 mo    Tyree Guevara MD  Radiation Oncology

## 2025-05-08 ENCOUNTER — HOSPITAL ENCOUNTER (OUTPATIENT)
Dept: RADIATION ONCOLOGY | Facility: HOSPITAL | Age: 65
Discharge: HOME | End: 2025-05-08
Attending: RADIOLOGY
Payer: MEDICARE

## 2025-05-08 VITALS
DIASTOLIC BLOOD PRESSURE: 76 MMHG | RESPIRATION RATE: 16 BRPM | WEIGHT: 187.81 LBS | OXYGEN SATURATION: 97 % | HEART RATE: 77 BPM | SYSTOLIC BLOOD PRESSURE: 134 MMHG | BODY MASS INDEX: 33 KG/M2 | TEMPERATURE: 98 F

## 2025-05-08 DIAGNOSIS — C54.1 ENDOMETRIAL CANCER, FIGO STAGE IIIC2 (HCC): Primary | ICD-10-CM

## 2025-05-08 NOTE — ADDENDUM NOTE
Encounter addended by: Patti Delgado RN on: 5/8/2025 11:11 AM   Actions taken: Clinical Note Signed

## 2025-05-08 NOTE — PATIENT INSTRUCTIONS
- WE WILL CALL TO SCHEDULE YOU FOR A FOLLOW-UP WITH DR. CAVAZOS IN AUGUST AFTER YOUR CT SCAN  - FOLLOW-UP WITH DR. AGARWAL AND DR. PERKINS  - CALL CENTRAL SCHEDULING [phone] TO SCHEDULE YOUR CT SCAN IN AUGUST  - CALL THE NURSE LINE AT [phone] IF YOU HAVE ANY QUESTIONS/CONCERNS REGARDING RADIATION THERAPY.

## 2025-05-09 ENCOUNTER — DOCUMENTATION ONLY (OUTPATIENT)
Dept: RADIATION ONCOLOGY | Facility: HOSPITAL | Age: 65
End: 2025-05-09

## 2025-05-22 ENCOUNTER — OFFICE VISIT (OUTPATIENT)
Age: 65
End: 2025-05-22
Attending: INTERNAL MEDICINE
Payer: MEDICARE

## 2025-05-22 VITALS
SYSTOLIC BLOOD PRESSURE: 133 MMHG | WEIGHT: 188.5 LBS | HEART RATE: 65 BPM | TEMPERATURE: 97 F | BODY MASS INDEX: 33.4 KG/M2 | OXYGEN SATURATION: 97 % | RESPIRATION RATE: 18 BRPM | HEIGHT: 62.99 IN | DIASTOLIC BLOOD PRESSURE: 82 MMHG

## 2025-05-22 DIAGNOSIS — C54.1 ENDOMETRIAL CANCER, FIGO STAGE IIIC2 (HCC): Primary | ICD-10-CM

## 2025-05-22 DIAGNOSIS — R59.0 LYMPHADENOPATHY, RETROPERITONEAL: ICD-10-CM

## 2025-05-22 DIAGNOSIS — Z51.12 ENCOUNTER FOR ANTINEOPLASTIC IMMUNOTHERAPY: ICD-10-CM

## 2025-05-22 LAB
ALBUMIN SERPL-MCNC: 4.9 G/DL (ref 3.2–4.8)
ALBUMIN/GLOB SERPL: 2.7 {RATIO} (ref 1–2)
ALP LIVER SERPL-CCNC: 92 U/L (ref 50–130)
ALT SERPL-CCNC: 32 U/L (ref 10–49)
ANION GAP SERPL CALC-SCNC: 6 MMOL/L (ref 0–18)
AST SERPL-CCNC: 20 U/L (ref ?–34)
BASOPHILS # BLD AUTO: 0.02 X10(3) UL (ref 0–0.2)
BASOPHILS NFR BLD AUTO: 0.9 %
BILIRUB SERPL-MCNC: 0.7 MG/DL (ref 0.2–1.1)
BUN BLD-MCNC: 12 MG/DL (ref 9–23)
CALCIUM BLD-MCNC: 10.4 MG/DL (ref 8.7–10.6)
CHLORIDE SERPL-SCNC: 106 MMOL/L (ref 98–112)
CO2 SERPL-SCNC: 26 MMOL/L (ref 21–32)
CREAT BLD-MCNC: 0.59 MG/DL (ref 0.55–1.02)
EGFRCR SERPLBLD CKD-EPI 2021: 100 ML/MIN/1.73M2 (ref 60–?)
EOSINOPHIL # BLD AUTO: 0.1 X10(3) UL (ref 0–0.7)
EOSINOPHIL NFR BLD AUTO: 4.6 %
ERYTHROCYTE [DISTWIDTH] IN BLOOD BY AUTOMATED COUNT: 13.6 %
GLOBULIN PLAS-MCNC: 1.8 G/DL (ref 2–3.5)
GLUCOSE BLD-MCNC: 93 MG/DL (ref 70–99)
HCT VFR BLD AUTO: 32.4 % (ref 35–48)
HGB BLD-MCNC: 11.2 G/DL (ref 12–16)
IMM GRANULOCYTES # BLD AUTO: 0.02 X10(3) UL (ref 0–1)
IMM GRANULOCYTES NFR BLD: 0.9 %
LYMPHOCYTES # BLD AUTO: 0.26 X10(3) UL (ref 1–4)
LYMPHOCYTES NFR BLD AUTO: 11.9 %
MCH RBC QN AUTO: 30.9 PG (ref 26–34)
MCHC RBC AUTO-ENTMCNC: 34.6 G/DL (ref 31–37)
MCV RBC AUTO: 89.5 FL (ref 80–100)
MONOCYTES # BLD AUTO: 0.24 X10(3) UL (ref 0.1–1)
MONOCYTES NFR BLD AUTO: 11 %
NEUTROPHILS # BLD AUTO: 1.54 X10 (3) UL (ref 1.5–7.7)
NEUTROPHILS # BLD AUTO: 1.54 X10(3) UL (ref 1.5–7.7)
NEUTROPHILS NFR BLD AUTO: 70.7 %
OSMOLALITY SERPL CALC.SUM OF ELEC: 285 MOSM/KG (ref 275–295)
PLATELET # BLD AUTO: 117 10(3)UL (ref 150–450)
POTASSIUM SERPL-SCNC: 3.8 MMOL/L (ref 3.5–5.1)
PROT SERPL-MCNC: 6.7 G/DL (ref 5.7–8.2)
RBC # BLD AUTO: 3.62 X10(6)UL (ref 3.8–5.3)
SODIUM SERPL-SCNC: 138 MMOL/L (ref 136–145)
T4 FREE SERPL-MCNC: 1.3 NG/DL (ref 0.8–1.7)
TSI SER-ACNC: 0.42 UIU/ML (ref 0.55–4.78)
WBC # BLD AUTO: 2.2 X10(3) UL (ref 4–11)

## 2025-05-22 NOTE — PROGRESS NOTES
Cancer Center Progress Note  Patient Name: Monica Holguin   YOB: 1960   Medical Record Number: [id number]   [facility]: [id number]   Attending Physician: Celio Trujillo M.D.       Date of Visit: 5/22/25      Chief Complaint:  Chief Complaint   Patient presents with    Follow - Up        Oncologic History:  Monica Holguin is a 65 year old female referred by Dr. 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  The patient was initially diagnosed with anal cancer in 2013 when she complained of rectal bleeding and inability to use a tampon during her menstrual periods.  She was seen by gynecology, who detected a vaginal mass and referred her to GI for colonoscopy and biopsy.  She also had severe Fe deficiency anemia, at the time, with a hgb around 4.  She was treated with chemoRT by Dr. Berumen at [facility] in [place].  She recalls Mitomycin C and 5FU as the chemo agents. She had a complete response.  She transferred her care to [facility], due to an insurance change, and has been following there with q6 month exams and annual CT.  She has been told that there has been no evidence of recurrence.  Her insurance has mandated a change, again, and she has been referred here for ongoing care.  She has also seen Dr. Ayon, Surgery, who has planned an exam under anesthesthesia to document the absence of recurrence.  She complains of intermittent BRBPR, that she attributes to an anal fissure that is aggravated by her intermittent constipation.  She has also been told that her Thyroid is \"off\" and that may be contributing to her constipation.  She reports a history of IBS with constipation prior to her cancer diagnosis.  Her colonoscopy in November, 2016 was reportedly normal.  Repeat C-scope in Nov, 2017 revealed no evidence of disease.        She saw gynecology for an exam on 8/9/17.        She was seen in the ER on 8/23/24 complaining of abdominal pain. CT revealed a large endometrial mass and  prominent but not enlarged retroperitoneal nodes. She was referred back for evaluation. She was referred to gyn onc and saw Dr. Valdivia    On 9/26/24 she underwent an ex-lap, MONSE/BSO, periaortic lymph node dissection at [facility].   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 Tumor deposits were seen in the lymphatics. Tumor cells were positive for p16. The tumor involved the cervix and 3 of 5 periaortic nodes were positive for metastatic carcinoma. MMR was intact.  Stage IIIc2. Dr. Valdivia recommended 6 cycles of adjuvant Carbo/Taxol with a checkpoint inhibitor. This would possibly be followed by periaortic radiation.     History of Present Illness:  Pt is here for follow up and maintenance Pembro. She completed radiation 5/9/25. Tolerated with fatigue.    Performance Status:  ECOG 1    Past Medical History:  Past Medical History:    Anal cancer (HCC)    Anal cancer (HCC)    Anesthesia complication    slow to arouse    Anxiety state    Asthma (HCC)    excerise induced    Back problem    upper back pain from radiation    Bipolar disorder (HCC)    Bloating    Blood disorder    anemia resolved    Constipation    Years    Depression    Disorder of thyroid    Exposure to radiation    last dose February 2014    Fatigue    Fibromyalgia    Frequent urination    Headache disorder    Neck problems    History of blood transfusion    History of depression    Hx of motion sickness    Hypothyroidism (acquired)    Dx in 8/2017    Irregular bowel habits    Leaking of urine    After surgery    Migraines    in the past    Nausea    Gallbladder attacks    Neoplasm, breast    Pain in joints    Chemo    Personal history of antineoplastic chemotherapy     last dose 2/2014, patient also completed radiation therapy 2014    PONV (postoperative nausea and vomiting)    Presence of other cardiac implants and grafts    Left ankle    Restless leg syndrome    Restless leg syndrome    Sleep disturbance     Stress    Uncomfortable fullness after meals    Uterine cancer (HCC)    Visual impairment    glasses,contacts    Wears glasses       Past Surgical History:  Past Surgical History:   Procedure Laterality Date          Colonoscopy N/A 11/10/2017    Procedure: COLONOSCOPY;  Surgeon: Ayan Ayon DO;  Location:  ENDOSCOPY    Colonoscopy N/A 2018    Procedure: COLONOSCOPY, POSSIBLE BIOPSY, POSSIBLE POLYPECTOMY 95721;  Surgeon: Ayan Ayon DO;  Location: [facility]    Hysterectomy      Estrada biopsy stereo nodule 1 site right (cpt=19081)      benign     Estrada biopsy stereo nodule 2 site bilat (cpt=19081/91727)      benign     Estrada localization wire 1 site left (cpt=19281)      Estrada localization wire 1 site right (cpt=19281)      Other      left knee surgery- allograft    Other      left ankle fracture     Other  2017    Anal exam under anesthesia    Other surgical history  10/2020    Port removal     Port, indwelling, imp      Sigmoidoscopy,diagnostic      Tonsillectomy  1963    Total abdom hysterectomy  24       Family History:  Family History   Problem Relation Age of Onset    Cancer Father         lung    Other (anuyrsem) Mother     Thyroid disease Sister         Hypothyroidism on [place] thyroid    Cancer Self        Social History:  Social History     Socioeconomic History    Marital status:      Spouse name: Not on file    Number of children: 3    Years of education: Not on file    Highest education level: Not on file   Occupational History    Not on file   Tobacco Use    Smoking status: Never    Smokeless tobacco: Never    Tobacco comments:     non-smoker   Vaping Use    Vaping status: Never Used   Substance and Sexual Activity    Alcohol use: Yes     Comment: rare    Drug use: No    Sexual activity: Not Currently     Partners: Male   Other Topics Concern     Service Not Asked    Blood Transfusions Not Asked    Caffeine Concern Yes     Comment: 1 cup coffee per day     Occupational Exposure Not Asked    Hobby Hazards Not Asked    Sleep Concern Not Asked    Stress Concern Not Asked    Weight Concern Not Asked    Special Diet Not Asked    Back Care Not Asked    Exercise Yes     Comment: walking    Bike Helmet Not Asked    Seat Belt Yes    Self-Exams Not Asked   Social History Narrative    , lives with     Has 2 sons, 1 daughter - all living in same state     Social Drivers of Health     Food Insecurity: Patient Declined (4/2/2025)    Received from [facility]    Hunger Vital Sign     Worried About Running Out of Food in the Last Year: Patient declined     Ran Out of Food in the Last Year: Patient declined   Transportation Needs: No Transportation Needs (4/2/2025)    Received from [facility]    PRAPARE - Transportation     Lack of Transportation (Medical): No     Lack of Transportation (Non-Medical): No   Housing Stability: Unknown (4/2/2025)    Received from [facility]    Housing Stability Vital Sign     Unable to Pay for Housing in the Last Year: No     Number of Times Moved in the Last Year: Not on file     Homeless in the Last Year: Not on file       Current Medications:    Current Outpatient Medications:     levothyroxine 88 MCG Oral Tab, Take 1 tablet (88 mcg total) by mouth before breakfast., Disp: 30 tablet, Rfl: 3    pantoprazole 40 MG Oral Tab EC, Take 1 tablet (40 mg total) by mouth 2 (two) times daily before meals., Disp: 180 tablet, Rfl: 1    Wheat Dextrin (BENEFIBER OR), Take by mouth., Disp: , Rfl:     metoclopramide 10 MG Oral Tab, Take 1 tablet (10 mg total) by mouth 4 (four) times daily as needed (nausea)., Disp: 60 tablet, Rfl: 3    ondansetron 8 MG Oral Tablet Dispersible, Take 1 tablet (8 mg total) by mouth every 8 (eight) hours as needed for Nausea., Disp: 30 tablet, Rfl: 3    Gabapentin Enacarbil ER (HORIZANT) 300 MG Oral Tab CR, Take 900 mg by mouth in the morning., Disp: , Rfl:      Allergies:  Allergies   Allergen Reactions    Chlorhexidine HIVES    Citalopram HIVES    Etomidate ANAPHYLAXIS    Gabapentin OTHER (SEE COMMENTS)     swelling    Lexapro [Escitalopram] SWELLING     Legs and ankles    Mold WHEEZING    Pramipexole SWELLING    Pregabalin SWELLING    Quetiapine Fumarate HIVES, ITCHING and INSOMNIA    Dust OTHER (SEE COMMENTS)     Sneezing, sinus infection    Ropinirole OTHER (SEE COMMENTS)     Restless legs    Adhesive Tape RASH     Need to use surgery glue    Promethazine NAUSEA ONLY        Review of Systems:    Constitutional No fevers, chills, night sweats, excessive fatigue or weight loss.   Eyes No significant visual difficulties. No diplopia. No yellowing of the eyes.   Hematologic/Lymphatic No easy bruising or bleeding.  No any tender or palpable lymph nodes.   Respiratory No dyspnea, Pleuritic chest pain, cough or hemoptysis.   Cardiovascular No anginal chest pain, palpitations or orthopnea.   Gastrointestinal No nausea, vomiting, diarrhea, GI bleeding, or constipation.   Genitorurinary  No hematuria, dysuria, or incontinence. No abnormal bleeding.   Integumentary No rashes or yellowing of the skin   Neurologic No headache, blurred vision, and no areas of focal weakness. Normal gait.   Psychiatric No insomnia, depression, phyllis or mood swings.         Vital Signs:  Height: 160 cm (5' 2.99\") (05/22 1058)  Weight: 85.5 kg (188 lb 8 oz) (05/22 1058)  BSA (Calculated - sq m): 1.89 sq meters (05/22 1058)  Pulse: 65 (05/22 1058)  BP: 133/82 (05/22 1058)  Temp: 97 °F (36.1 °C) (05/22 1058)  Do Not Use - Resp Rate: --  SpO2: 97 % (05/22 1058)    Physical Examination:    Laboratory:  Recent Labs     05/22/25  1053   RBC 3.62 L   HGB 11.2 L   HCT 32.4 L   MCV 89.5   MCH 30.9   MCHC 34.6   RDW 13.6   NEPRELIM 1.54   WBC 2.2 L   .0 L     Recent Labs     05/22/25  1053   GLU 93   BUN 12   CREATSERUM 0.59   CA 10.4   ALB 4.9 H      K 3.8      CO2 26.0   ALKPHO 92   AST  20   ALT 32   BILT 0.7   TP 6.7       Radiology:  CT C/A/P: CONCLUSION:       1. Gallstones in the gallbladder.      2. No evidence of metastatic disease.      3. No lymphadenopathy.      4. No suspicious nodule, mass or consolidation.      5. Nonspecific mild bladder wall thickening.  This is of unknown etiology.  This may be sequelae of cystitis.               Pathology:  Final Diagnosis:   Right inferior parathyroid:  -Hypercellular parathyroid gland, 10 mm, with rim tissue.  -See comment.       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 Adjuvant chemo-immunotherapy has been recommended with Carbo Taxol + A checkpoint inhibitor x6 cycles. Radiation use is limited in her case due to the prior Rt for the anal canal cancer. Once she completes the adjuvant chemo-Immunotherapy, will plan for her to see Radiation to consider RT to the periaortic nodes, though I was concerned about the esophageal findings and mediastinal node. EGD revealed only esophagitis.  Proceed with Cycle 6 of Carbo/Taxol/Keytruda.  CT Demonstrates KASSANDRA. She has completed radiation. Will proceed with adjuvant Keytruda.     Retroperitoneal adenopathy. She has completed radiation. Will monitor on future scans.     History of Anal Canal cancer:  KASSANDRA.  Continue follow up with surgery.       Planned Follow Up:  3 weeks.    The patient and I have a longitudinal relationship to address/treat this serious and complex condition      Electronically Signed by:    Celio Trujillo M.D.  [facility]

## 2025-05-22 NOTE — PROGRESS NOTES
Pt here for C9D1 Drug(s)keytruda.  Arrives Ambulating independently, accompanied by Self     Patient was evaluated today by MD.    Oral medications included in this regimen:  no    Patient confirms comprehension of cancer treatment schedule:  yes    Pregnancy screening:  Denies possibility of pregnancy    Modifications in dose or schedule:  No    Medications appearance and physical integrity checked by RN: yes.    Chemotherapy IV pump settings verified by 2 RNs:  No due to targeted therapy IV administration.  Frequency of blood return and site check throughout administration: Prior to administration and At completion of therapy     Infusion/treatment outcome:  patient tolerated treatment without incident    Education Record    Learner:  Patient  Barriers / Limitations:  None  Method:  Discussion  Education / instructions given:  medications administered, appts.  Outcome:  Shows understanding    Discharged Home, Ambulating independently, accompanied by:Self    Patient/family verbalized understanding of future appointments: by MyChart messaging

## 2025-06-20 ENCOUNTER — OFFICE VISIT (OUTPATIENT)
Age: 65
End: 2025-06-20
Attending: INTERNAL MEDICINE
Payer: MEDICARE

## 2025-06-20 VITALS
OXYGEN SATURATION: 98 % | BODY MASS INDEX: 32.34 KG/M2 | TEMPERATURE: 97 F | DIASTOLIC BLOOD PRESSURE: 74 MMHG | HEART RATE: 71 BPM | WEIGHT: 182.5 LBS | SYSTOLIC BLOOD PRESSURE: 118 MMHG | RESPIRATION RATE: 18 BRPM | HEIGHT: 62.99 IN

## 2025-06-20 DIAGNOSIS — R19.7 DIARRHEA, UNSPECIFIED TYPE: Primary | ICD-10-CM

## 2025-06-20 DIAGNOSIS — C54.1 ENDOMETRIAL CANCER, FIGO STAGE IIIC2 (HCC): ICD-10-CM

## 2025-06-20 DIAGNOSIS — R10.811 RIGHT UPPER QUADRANT ABDOMINAL TENDERNESS WITHOUT REBOUND TENDERNESS: ICD-10-CM

## 2025-06-20 LAB
ALBUMIN SERPL-MCNC: 4.2 G/DL (ref 3.2–4.8)
ALBUMIN/GLOB SERPL: 1.9 {RATIO} (ref 1–2)
ALP LIVER SERPL-CCNC: 114 U/L (ref 50–130)
ALT SERPL-CCNC: 49 U/L (ref 10–49)
ANION GAP SERPL CALC-SCNC: 5 MMOL/L (ref 0–18)
AST SERPL-CCNC: 37 U/L (ref ?–34)
BASOPHILS # BLD AUTO: 0.01 X10(3) UL (ref 0–0.2)
BASOPHILS NFR BLD AUTO: 0.3 %
BILIRUB SERPL-MCNC: 0.5 MG/DL (ref 0.2–1.1)
BUN BLD-MCNC: 13 MG/DL (ref 9–23)
CALCIUM BLD-MCNC: 9.7 MG/DL (ref 8.7–10.6)
CHLORIDE SERPL-SCNC: 108 MMOL/L (ref 98–112)
CO2 SERPL-SCNC: 27 MMOL/L (ref 21–32)
CREAT BLD-MCNC: 0.69 MG/DL (ref 0.55–1.02)
CRP SERPL-MCNC: <0.4 MG/DL (ref ?–0.5)
EGFRCR SERPLBLD CKD-EPI 2021: 96 ML/MIN/1.73M2 (ref 60–?)
EOSINOPHIL # BLD AUTO: 0.81 X10(3) UL (ref 0–0.7)
EOSINOPHIL NFR BLD AUTO: 25.1 %
ERYTHROCYTE [DISTWIDTH] IN BLOOD BY AUTOMATED COUNT: 15.3 %
GLOBULIN PLAS-MCNC: 2.2 G/DL (ref 2–3.5)
GLUCOSE BLD-MCNC: 93 MG/DL (ref 70–99)
HCT VFR BLD AUTO: 32.5 % (ref 35–48)
HGB BLD-MCNC: 11.1 G/DL (ref 12–16)
IMM GRANULOCYTES # BLD AUTO: 0.01 X10(3) UL (ref 0–1)
IMM GRANULOCYTES NFR BLD: 0.3 %
LYMPHOCYTES # BLD AUTO: 0.38 X10(3) UL (ref 1–4)
LYMPHOCYTES NFR BLD AUTO: 11.8 %
MAGNESIUM SERPL-MCNC: 1.9 MG/DL (ref 1.6–2.6)
MCH RBC QN AUTO: 31 PG (ref 26–34)
MCHC RBC AUTO-ENTMCNC: 34.2 G/DL (ref 31–37)
MCV RBC AUTO: 90.8 FL (ref 80–100)
MONOCYTES # BLD AUTO: 0.29 X10(3) UL (ref 0.1–1)
MONOCYTES NFR BLD AUTO: 9 %
NEUTROPHILS # BLD AUTO: 1.73 X10 (3) UL (ref 1.5–7.7)
NEUTROPHILS # BLD AUTO: 1.73 X10(3) UL (ref 1.5–7.7)
NEUTROPHILS NFR BLD AUTO: 53.5 %
OSMOLALITY SERPL CALC.SUM OF ELEC: 290 MOSM/KG (ref 275–295)
PLATELET # BLD AUTO: 148 10(3)UL (ref 150–450)
POTASSIUM SERPL-SCNC: 3.7 MMOL/L (ref 3.5–5.1)
PROT SERPL-MCNC: 6.4 G/DL (ref 5.7–8.2)
RBC # BLD AUTO: 3.58 X10(6)UL (ref 3.8–5.3)
SODIUM SERPL-SCNC: 140 MMOL/L (ref 136–145)
WBC # BLD AUTO: 3.2 X10(3) UL (ref 4–11)

## 2025-06-20 PROCEDURE — 87493 C DIFF AMPLIFIED PROBE: CPT

## 2025-06-20 PROCEDURE — 83993 ASSAY FOR CALPROTECTIN FECAL: CPT

## 2025-06-20 PROCEDURE — 87507 IADNA-DNA/RNA PROBE TQ 12-25: CPT

## 2025-06-20 RX ORDER — PALONOSETRON 0.05 MG/ML
0.25 INJECTION, SOLUTION INTRAVENOUS ONCE
Status: CANCELLED
Start: 2025-06-20

## 2025-06-20 RX ORDER — FAMOTIDINE 10 MG/ML
20 INJECTION, SOLUTION INTRAVENOUS ONCE
Status: COMPLETED | OUTPATIENT
Start: 2025-06-20 | End: 2025-06-20

## 2025-06-20 RX ORDER — PALONOSETRON 0.05 MG/ML
0.25 INJECTION, SOLUTION INTRAVENOUS ONCE
Status: CANCELLED
Start: 2025-06-20 | End: 2025-06-20

## 2025-06-20 RX ORDER — METOCLOPRAMIDE 10 MG/1
10 TABLET ORAL 4 TIMES DAILY PRN
Qty: 60 TABLET | Refills: 3 | Status: SHIPPED | OUTPATIENT
Start: 2025-06-20

## 2025-06-20 RX ORDER — PALONOSETRON 0.05 MG/ML
0.25 INJECTION, SOLUTION INTRAVENOUS ONCE
Status: COMPLETED | OUTPATIENT
Start: 2025-06-20 | End: 2025-06-20

## 2025-06-20 RX ORDER — FAMOTIDINE 10 MG/ML
20 INJECTION, SOLUTION INTRAVENOUS ONCE
Status: CANCELLED
Start: 2025-06-20 | End: 2025-06-20

## 2025-06-20 RX ORDER — ONDANSETRON 8 MG/1
8 TABLET, ORALLY DISINTEGRATING ORAL EVERY 8 HOURS PRN
Qty: 30 TABLET | Refills: 3 | Status: SHIPPED | OUTPATIENT
Start: 2025-06-20

## 2025-06-20 RX ADMIN — PALONOSETRON 0.25 MG: 0.05 INJECTION, SOLUTION INTRAVENOUS at 10:09:00

## 2025-06-20 RX ADMIN — FAMOTIDINE 20 MG: 10 INJECTION, SOLUTION INTRAVENOUS at 10:13:00

## 2025-06-20 NOTE — PROGRESS NOTES
Pt here for ACC and IVF . Pt denies any issues or concerns.      Ordering Provider: Valeria TAYLOR  Order Exp: x1 this visit     Pt tolerated infusion without difficulty or complaint. Reviewed next apt date/time: as scheduled      Education Record  Learner:  Patient  Disease / Diagnosis: nausea, abd pain, diarrhea  Barriers / Limitations:  None  Method:  Discussion, Printed material, and Reinforcement  General Topics:  Medication, Side effects and symptom management, and Plan of care reviewed  Outcome:  Shows understanding    Pt given stool sample collection kit and instructions on delivering to nearest [facility] lab or refrigerating.     Pt departed stable.

## 2025-06-20 NOTE — PROGRESS NOTES
Cancer Center Progress Note    Patient Name: Monica Holguin   YOB: 1960   Medical Record Number: [id number]   [facility]: [id number]   Date of visit: 6/20/2025     Chief Complaint/Reason for Visit:  Chief Complaint   Patient presents with    Follow - Up     ACV      Patient EMR Message:    Hey, I have been having gut pain about half hour after eating and also diarrhea after and have been taking 2 imodium daily for 4 days then off 1 then back again. For the past 3 days I have gotten flu like symptoms after I eat then gut and gut pain. With this pain I also have severe back pain. what should I be doing?     History of Present Illness: Monica presents today with the above complaints of diarrhea, nausea, and poor oral intake. She first noticed this approximately 2 weeks ago, she has been taking loperamide one tablet BID and having 3-6 episodes of diarrhea. Diarrhea has not been consistently every day, she will have a period of no bowel movement for about 24 hours every few days. Stools are loose, sometimes they float. No blood or mucous in the stool. She reports abdominal discomfort after eating food so this has affected her oral intake. She has occasional nausea, no vomiting. She has tenderness with deep palpation in abdomen.No other family members have similar symptoms. She denies recent fever or chills.     Patient has endometrial cancer, she last received cycle 9 of maintenance pembrolizumab on 5/22/2025. Her medical oncologist is Dr. Celio Trujillo, additional oncology history below.    Oncology History:  Initially diagnosed with anal cancer in 2013 when she complained of rectal bleeding and inability to use a tampon during her menstrual periods.  She was seen by gynecology, who detected a vaginal mass and referred her to GI for colonoscopy and biopsy.  She also had severe Fe deficiency anemia, at the time, with a hgb around 4.  She was treated with chemoRT by Dr. Berumen at [facility] in [place].   She recalls Mitomycin C and 5FU as the chemo agents. She had a complete response.  She transferred her care to [facility], due to an insurance change, and had been following there with q6 month exams and annual CT.  She had been told that there has been no evidence of recurrence.  Her insurance has mandated a change, again, and she has been referred here for ongoing care.  She had also seen Dr. Ayon, Surgery, who has planned an exam under anesthesthesia to document the absence of recurrence. Repeat C-scope in Nov, 2017 revealed no evidence of disease.        She saw gynecology for an exam on 8/9/17.         She was seen in the ER on 8/23/24 complaining of abdominal pain. CT revealed a large endometrial mass and prominent but not enlarged retroperitoneal nodes. She was referred back for evaluation. She was referred to gyn onc and saw Dr. Valdivia.      On 9/26/24 she underwent an ex-lap, MONSE/BSO, periaortic lymph node dissection at [facility].   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 Tumor deposits were seen in the lymphatics. Tumor cells were positive for p16. The tumor involved the cervix and 3 of 5 periaortic nodes were positive for metastatic carcinoma. MMR was intact.  Stage IIIc2. Dr. Valdivia recommended 6 cycles of adjuvant Carbo/Taxol with a checkpoint inhibitor (pembroliuzmab), last cycle on 2/6/2025.     She completed consolidative radiation therapy to retroperitoneal lymph nodes on 5/8/2025.    She remains on pembroliuzmab maintenance.    Problem List:  Problem List[1]     Medical History:  Past Medical History[2]    Surgical History:  Past Surgical History[3]    Allergies:  Allergies[4]    Family History:  Family History[5]    Social History:  Social History     Socioeconomic History    Marital status:      Spouse name: Not on file    Number of children: 3    Years of education: Not on file    Highest education level: Not on file   Occupational History     Not on file   Tobacco Use    Smoking status: Never    Smokeless tobacco: Never    Tobacco comments:     non-smoker   Vaping Use    Vaping status: Never Used   Substance and Sexual Activity    Alcohol use: Yes     Comment: rare    Drug use: No    Sexual activity: Not Currently     Partners: Male   Other Topics Concern     Service Not Asked    Blood Transfusions Not Asked    Caffeine Concern Yes     Comment: 1 cup coffee per day    Occupational Exposure Not Asked    Hobby Hazards Not Asked    Sleep Concern Not Asked    Stress Concern Not Asked    Weight Concern Not Asked    Special Diet Not Asked    Back Care Not Asked    Exercise Yes     Comment: walking    Bike Helmet Not Asked    Seat Belt Yes    Self-Exams Not Asked   Social History Narrative    , lives with     Has 2 sons, 1 daughter - all living in same state     Social Drivers of Health     Food Insecurity: Patient Declined (4/2/2025)    Received from [facility]    Hunger Vital Sign     Worried About Running Out of Food in the Last Year: Patient declined     Ran Out of Food in the Last Year: Patient declined   Transportation Needs: No Transportation Needs (4/2/2025)    Received from [facility]    PRAPARE - Transportation     Lack of Transportation (Medical): No     Lack of Transportation (Non-Medical): No   Housing Stability: Unknown (4/2/2025)    Received from [facility]    Housing Stability Vital Sign     Unable to Pay for Housing in the Last Year: No     Number of Times Moved in the Last Year: Not on file     Homeless in the Last Year: Not on file       Medications:  Medications - Current[6]    Review of Systems:  A comprehensive 14 point review of systems was completed.  Pertinent positives and negatives noted in the HPI.    Physical Examination:  General: Patient is alert and oriented x 3, not in acute distress.  Vital Signs: Height: 160 cm (5' 2.99\") (06/20  0845)  Weight: 82.8 kg (182 lb 8 oz) (06/20 0845)  BSA (Calculated - sq m): 1.86 sq meters (06/20 0845)  Pulse: 71 (06/20 0845)  BP: 118/74 (06/20 0845)  Temp: 96.6 °F (35.9 °C) (06/20 0845)  Do Not Use - Resp Rate: --  SpO2: 98 % (06/20 0845)  HEENT: Anicteric, conjunctivae and sclerae clear, no oropharyngeal lesion/thrush, mucous membranes are moist   Chest: Clear to auscultation. Respirations unlabored.   Heart: Regular rate and rhythm.   Abdomen: Soft, non-distended, tender with deep palpation to right and middle abdominal quadrants   Extremities: No edema.  Neurological: Grossly intact.   Skin: warm, dry, no erythema or rash   Psych/Depression: mood and affect are appropriate.     Labs:     Recent Results (from the past 72 hours)   C-REACTIVE PROTEIN [E]    Collection Time: 06/20/25  9:53 AM   Result Value Ref Range    C-Reactive Protein <0.40 <=0.50 mg/dL   CBC W/DIFF [E]    Collection Time: 06/20/25  9:53 AM   Result Value Ref Range    WBC 3.2 (L) 4.0 - 11.0 x10(3) uL    RBC 3.58 (L) 3.80 - 5.30 x10(6)uL    HGB 11.1 (L) 12.0 - 16.0 g/dL    HCT 32.5 (L) 35.0 - 48.0 %    .0 (L) 150.0 - 450.0 10(3)uL    MCV 90.8 80.0 - 100.0 fL    MCH 31.0 26.0 - 34.0 pg    MCHC 34.2 31.0 - 37.0 g/dL    RDW 15.3 %    Neutrophil Absolute Prelim 1.73 1.50 - 7.70 x10 (3) uL    Neutrophil Absolute 1.73 1.50 - 7.70 x10(3) uL    Lymphocyte Absolute 0.38 (L) 1.00 - 4.00 x10(3) uL    Monocyte Absolute 0.29 0.10 - 1.00 x10(3) uL    Eosinophil Absolute 0.81 (H) 0.00 - 0.70 x10(3) uL    Basophil Absolute 0.01 0.00 - 0.20 x10(3) uL    Immature Granulocyte Absolute 0.01 0.00 - 1.00 x10(3) uL    Neutrophil % 53.5 %    Lymphocyte % 11.8 %    Monocyte % 9.0 %    Eosinophil % 25.1 %    Basophil % 0.3 %    Immature Granulocyte % 0.3 %   COMP METABOLIC PANEL [E]    Collection Time: 06/20/25  9:53 AM   Result Value Ref Range    Glucose 93 70 - 99 mg/dL    Sodium 140 136 - 145 mmol/L    Potassium 3.7 3.5 - 5.1 mmol/L    Chloride 108 98 - 112  mmol/L    CO2 27.0 21.0 - 32.0 mmol/L    Anion Gap 5 0 - 18 mmol/L    BUN 13 9 - 23 mg/dL    Creatinine 0.69 0.55 - 1.02 mg/dL    Calcium, Total 9.7 8.7 - 10.6 mg/dL    Calculated Osmolality 290 275 - 295 mOsm/kg    eGFR-Cr 96 >=60 mL/min/1.73m2    AST 37 (H) <34 U/L    ALT 49 10 - 49 U/L    Alkaline Phosphatase 114 50 - 130 U/L    Bilirubin, Total 0.5 0.2 - 1.1 mg/dL    Total Protein 6.4 5.7 - 8.2 g/dL    Albumin 4.2 3.2 - 4.8 g/dL    Globulin  2.2 2.0 - 3.5 g/dL    A/G Ratio 1.9 1.0 - 2.0    Patient Fasting for CMP? Patient not present    MAGNESIUM [E]    Collection Time: 06/20/25  9:53 AM   Result Value Ref Range    Magnesium 1.9 1.6 - 2.6 mg/dL       Impression/Plan    Abdominal discomfort, diarrhea, nausea: abdominal tenderness with deep palpation, no rebound tenderness.She is not having fever or chills, not having severe constant pain. Labs are unrevealing, check stool for infection (viral and C. Diff) and calprotectin for possible immune mediated colitis. Will also obtain CT abdomen/pelvis. IV fluids with IV antiemetic given in clinic today. Patient instructed on symptoms that would warrant immediate medical attention.     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 Tumor deposits were seen in the lymphatics. The tumor involved the cervix and 3 of 5 periaortic nodes were positive for metastatic carcinoma. MMR was intact.  Stage IIIc2. Dr. Valdivia recommended 6 cycles of adjuvant Carbo/Taxol with a checkpoint inhibitor (pembroliuzmab), completed on 2/6/2025. She then completed consolidative radiation therapy to retroperitoneal lymph nodes on 5/8/2025. She remains on pembroliuzmab maintenance, last cycle 9 on 5/22/2025.     Risk Level: HIGH endometrial cancer receiving immunotherapy requiring close monitoring     The 21st Century Cures Act makes medical notes like these available to patients in the interest of  transparency. Please be advised this is a medical document. Medical documents are intended to carry relevant information, facts as evident, and the clinical opinion of the practitioner. The medical note is intended as peer to peer communication and may appear blunt or direct. It is written in medical language and may contain abbreviations or verbiage that are unfamiliar.     Electronically Signed by:    Valeria Christina, USMAN, APRN, NP-C, AOCNP  Nurse Practitioner  [facility]         [1]   Patient Active Problem List  Diagnosis    Hypothyroidism (acquired)    Class 1 obesity with serious comorbidity and body mass index (BMI) of 33.0 to 33.9 in adult    Restless leg syndrome    Cervical radiculopathy    Hypercalcemia    Hyperlipidemia    History of anal cancer    S/P parathyroidectomy    Endometrial cancer, FIGO stage IIIC2 (HCC)    Primary hyperparathyroidism (HCC)    Asthma with COPD (chronic obstructive pulmonary disease) (HCC)    Abnormal finding on GI tract imaging    Diarrhea   [2]   Past Medical History:   Anal cancer (HCC)    Anal cancer (HCC)    Anesthesia complication    slow to arouse    Anxiety state    Asthma (HCC)    excerise induced    Back problem    upper back pain from radiation    Bipolar disorder (HCC)    Bloating    Blood disorder    anemia resolved    Constipation    Years    Depression    Disorder of thyroid    Exposure to radiation    last dose February 2014    Fatigue    Fibromyalgia    Frequent urination    Headache disorder    Neck problems    History of blood transfusion    History of depression    Hx of motion sickness    Hypothyroidism (acquired)    Dx in 8/2017    Irregular bowel habits    Leaking of urine    After surgery    Migraines    in the past    Nausea    Gallbladder attacks    Neoplasm, breast    Pain in joints    Chemo    Personal history of antineoplastic chemotherapy     last dose 2/2014, patient also completed radiation therapy 2014    PONV (postoperative nausea and  vomiting)    Presence of other cardiac implants and grafts    Left ankle    Restless leg syndrome    Restless leg syndrome    Sleep disturbance    Stress    Uncomfortable fullness after meals    Uterine cancer (HCC)    Visual impairment    glasses,contacts    Wears glasses   [3]   Past Surgical History:  Procedure Laterality Date          Colonoscopy N/A 11/10/2017    Procedure: COLONOSCOPY;  Surgeon: Ayan Ayon DO;  Location:  ENDOSCOPY    Colonoscopy N/A 2018    Procedure: COLONOSCOPY, POSSIBLE BIOPSY, POSSIBLE POLYPECTOMY 96577;  Surgeon: Ayan Ayon DO;  Location: [facility]    Hysterectomy      Estrada biopsy stereo nodule 1 site right (cpt=19081)      benign     Estrada biopsy stereo nodule 2 site bilat (cpt=19081/47773)      benign     Estrada localization wire 1 site left (cpt=19281)      Estrada localization wire 1 site right (cpt=19281)      Other      left knee surgery- allograft    Other      left ankle fracture     Other  2017    Anal exam under anesthesia    Other surgical history  10/2020    Port removal     Port, indwelling, imp      Sigmoidoscopy,diagnostic      Tonsillectomy  1963    Total abdom hysterectomy  24   [4]   Allergies  Allergen Reactions    Chlorhexidine HIVES    Citalopram HIVES    Etomidate ANAPHYLAXIS    Gabapentin OTHER (SEE COMMENTS)     swelling    Lexapro [Escitalopram] SWELLING     Legs and ankles    Mold WHEEZING    Pramipexole SWELLING    Pregabalin SWELLING    Quetiapine Fumarate HIVES, ITCHING and INSOMNIA    Dust OTHER (SEE COMMENTS)     Sneezing, sinus infection    Ropinirole OTHER (SEE COMMENTS)     Restless legs    Adhesive Tape RASH     Need to use surgery glue    Promethazine NAUSEA ONLY   [5]   Family History  Problem Relation Age of Onset    Cancer Father         lung    Other (anuyrsem) Mother     Thyroid disease Sister         Hypothyroidism on [place] thyroid    Cancer Self    [6]   Current Outpatient Medications:     metoclopramide 10 MG Oral  Tab, Take 1 tablet (10 mg total) by mouth 4 (four) times daily as needed (nausea)., Disp: 60 tablet, Rfl: 3    ondansetron 8 MG Oral Tablet Dispersible, Take 1 tablet (8 mg total) by mouth every 8 (eight) hours as needed for Nausea., Disp: 30 tablet, Rfl: 3    levothyroxine 88 MCG Oral Tab, Take 1 tablet (88 mcg total) by mouth before breakfast., Disp: 30 tablet, Rfl: 3    pantoprazole 40 MG Oral Tab EC, Take 1 tablet (40 mg total) by mouth 2 (two) times daily before meals., Disp: 180 tablet, Rfl: 1    Gabapentin Enacarbil ER (HORIZANT) 300 MG Oral Tab CR, Take 900 mg by mouth in the morning., Disp: , Rfl:

## 2025-06-20 NOTE — PROGRESS NOTES
Pt here for ACV. Pt last received treatment 5/22/2025. Pt states for the last two weeks she has been having to take Imodium two times a day and is still having diarrhea. She states she averages about 3-6 episodes of diarrhea per day. She states she even has episodes in the middle of the night. She states she has not been able to eat without having diarrhea or pain. Pt reports that after she eats/drinks anything, ~30 minutes later she starts to develop mid abdominal pain (\"like a knot in my mid abdomen), along with lower back pain. She states she is hungry, however she is afraid to eat because of her symptoms. She does have lightheadedness/dizziness, in which is associated with her decreased intake. No fevers, chills, or infectious symptoms reported. She does endorse she has had left calf pain that comes and goes - no redness, swelling, or warmth present to LLE.        Education Record    Learner:  Patient and Spouse    Disease / Diagnosis: ACV    Barriers / Limitations:  None   Comments:    Method:  Discussion   Comments:    General Topics:  Medication, Pain, Side effects and symptom management, and Plan of care reviewed   Comments:    Outcome:  Observed demonstration and Shows understanding   Comments:

## 2025-06-21 ENCOUNTER — LAB ENCOUNTER (OUTPATIENT)
Dept: LAB | Age: 65
End: 2025-06-21
Attending: NURSE PRACTITIONER
Payer: MEDICARE

## 2025-06-21 DIAGNOSIS — R19.7 DIARRHEA, UNSPECIFIED TYPE: ICD-10-CM

## 2025-06-21 DIAGNOSIS — R10.811 RIGHT UPPER QUADRANT ABDOMINAL TENDERNESS WITHOUT REBOUND TENDERNESS: ICD-10-CM

## 2025-06-21 DIAGNOSIS — C54.1 ENDOMETRIAL CANCER, FIGO STAGE IIIC2 (HCC): ICD-10-CM

## 2025-06-22 LAB
ADENOVIRUS F 40/41 PCR: NEGATIVE
ASTROVIRUS PCR: NEGATIVE
C CAYETANENSIS DNA SPEC QL NAA+PROBE: NEGATIVE
C DIFF TOX B STL QL: NEGATIVE
CALPROTECTIN STL-MCNT: 124 ΜG/G (ref ?–50)
CAMPY SP DNA.DIARRHEA STL QL NAA+PROBE: NEGATIVE
CRYPTOSP DNA SPEC QL NAA+PROBE: NEGATIVE
EAEC PAA PLAS AGGR+AATA ST NAA+NON-PRB: NEGATIVE
EC STX1+STX2 + H7 FLIC SPEC NAA+PROBE: NEGATIVE
ENTAMOEBA HISTOLYTICA PCR: NEGATIVE
EPEC EAE GENE STL QL NAA+NON-PROBE: NEGATIVE
ETEC LTA+ST1A+ST1B TOX ST NAA+NON-PROBE: NEGATIVE
GIARDIA LAMBLIA PCR: NEGATIVE
NOROVIRUS GI/GII PCR: NEGATIVE
P SHIGELLOIDES DNA STL QL NAA+PROBE: NEGATIVE
ROTAVIRUS A PCR: NEGATIVE
SALMONELLA DNA SPEC QL NAA+PROBE: NEGATIVE
SAPOVIRUS PCR: NEGATIVE
SHIGELLA SP+EIEC IPAH ST NAA+NON-PROBE: NEGATIVE
V CHOLERAE DNA SPEC QL NAA+PROBE: NEGATIVE
VIBRIO DNA SPEC NAA+PROBE: NEGATIVE
YERSINIA DNA SPEC NAA+PROBE: NEGATIVE

## 2025-06-23 ENCOUNTER — TELEPHONE (OUTPATIENT)
Age: 65
End: 2025-06-23

## 2025-06-23 RX ORDER — PREDNISONE 20 MG/1
40 TABLET ORAL DAILY
Qty: 10 TABLET | Refills: 0 | Status: SHIPPED | OUTPATIENT
Start: 2025-06-23 | End: 2025-06-28

## 2025-06-23 NOTE — TELEPHONE ENCOUNTER
Patient continues to experience loose bowel movements with intermittent abdominal cramping. Fecal calprotectin is elevated, will start prednisone 40mg once daily x 5 days. Patient has CT A/P scheduled 6/25/20225, will do phone visit on 6/26/2025 to discuss CT results and symptoms after starting prednisone for possible immune mediated colitis.

## 2025-06-25 ENCOUNTER — HOSPITAL ENCOUNTER (OUTPATIENT)
Dept: CT IMAGING | Age: 65
Discharge: HOME | End: 2025-06-25
Attending: NURSE PRACTITIONER
Payer: MEDICARE

## 2025-06-25 DIAGNOSIS — R10.811 RIGHT UPPER QUADRANT ABDOMINAL TENDERNESS WITHOUT REBOUND TENDERNESS: ICD-10-CM

## 2025-06-25 DIAGNOSIS — R19.7 DIARRHEA, UNSPECIFIED TYPE: ICD-10-CM

## 2025-06-25 DIAGNOSIS — C54.1 ENDOMETRIAL CANCER, FIGO STAGE IIIC2 (HCC): ICD-10-CM

## 2025-06-25 PROCEDURE — 74177 CT ABD & PELVIS W/CONTRAST: CPT | Performed by: NURSE PRACTITIONER

## 2025-06-25 RX ORDER — IOHEXOL 350 MG/ML
90 INJECTION, SOLUTION INTRAVENOUS
Status: COMPLETED | OUTPATIENT
Start: 2025-06-25 | End: 2025-06-25

## 2025-06-25 RX ADMIN — IOHEXOL 90 ML: 350 INJECTION, SOLUTION INTRAVENOUS at 14:49:00

## 2025-06-26 ENCOUNTER — VIRTUAL PHONE E/M (OUTPATIENT)
Age: 65
End: 2025-06-26
Attending: INTERNAL MEDICINE
Payer: MEDICARE

## 2025-06-26 DIAGNOSIS — C54.1 ENDOMETRIAL CANCER, FIGO STAGE IIIC2 (HCC): Primary | ICD-10-CM

## 2025-06-26 NOTE — TELEPHONE ENCOUNTER
Pt's referral for surgery has been approved. I called the pt and LM stating referral for surgery has been approved. LM stating referral # is [id number]. Advised in message to call back with questions.  charlie Opt out

## 2025-06-27 ENCOUNTER — TELEPHONE (OUTPATIENT)
Age: 65
End: 2025-06-27

## 2025-06-27 ENCOUNTER — VIRTUAL PHONE E/M (OUTPATIENT)
Age: 65
End: 2025-06-27
Attending: INTERNAL MEDICINE
Payer: MEDICARE

## 2025-06-27 DIAGNOSIS — C54.1 ENDOMETRIAL CANCER, FIGO STAGE IIIC2 (HCC): Primary | ICD-10-CM

## 2025-06-27 NOTE — TELEPHONE ENCOUNTER
Spoke with patient, I let her know that we do not have her CT results at this time. I let her know we are working on getting results; I let her know the office will contact her directly once we have results to discuss next steps. She took her last dose of steroids today. She stated understanding and thanked me for the call.

## 2025-07-02 NOTE — PROGRESS NOTES
Cancer Center Progress Note  Patient Name: Monica Holguin   YOB: 1960   Medical Record Number: [id number]   [facility]: [id number]   Attending Physician: Celio Trujillo M.D.       Date of Visit: 7/3/25      Chief Complaint:  No chief complaint on file.       Oncologic History:  Monica Holguin is a 65 year old female referred by Dr. 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  The patient was initially diagnosed with anal cancer in 2013 when she complained of rectal bleeding and inability to use a tampon during her menstrual periods.  She was seen by gynecology, who detected a vaginal mass and referred her to GI for colonoscopy and biopsy.  She also had severe Fe deficiency anemia, at the time, with a hgb around 4.  She was treated with chemoRT by Dr. Berumen at [facility] in [place].  She recalls Mitomycin C and 5FU as the chemo agents. She had a complete response.  She transferred her care to [facility], due to an insurance change, and has been following there with q6 month exams and annual CT.  She has been told that there has been no evidence of recurrence.  Her insurance has mandated a change, again, and she has been referred here for ongoing care.  She has also seen Dr. Ayon, Surgery, who has planned an exam under anesthesthesia to document the absence of recurrence.  She complains of intermittent BRBPR, that she attributes to an anal fissure that is aggravated by her intermittent constipation.  She has also been told that her Thyroid is \"off\" and that may be contributing to her constipation.  She reports a history of IBS with constipation prior to her cancer diagnosis.  Her colonoscopy in November, 2016 was reportedly normal.  Repeat C-scope in Nov, 2017 revealed no evidence of disease.        She saw gynecology for an exam on 8/9/17.        She was seen in the ER on 8/23/24 complaining of abdominal pain. CT revealed a large endometrial mass and prominent but not enlarged  retroperitoneal nodes. She was referred back for evaluation. She was referred to gyn onc and saw Dr. Valdivia    On 9/26/24 she underwent an ex-lap, MONSE/BSO, periaortic lymph node dissection at [facility].   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 Tumor deposits were seen in the lymphatics. Tumor cells were positive for p16. The tumor involved the cervix and 3 of 5 periaortic nodes were positive for metastatic carcinoma. MMR was intact.  Stage IIIc2. Dr. Valdivia recommended 6 cycles of adjuvant Carbo/Taxol with a checkpoint inhibitor. This would possibly be followed by periaortic radiation.     History of Present Illness:  Pt is here for follow up and maintenance Pembro. She completed radiation 5/9/25. Tolerated with fatigue.    Performance Status:  ECOG 1    Past Medical History:  Past Medical History:    Anal cancer (HCC)    Anal cancer (HCC)    Anesthesia complication    slow to arouse    Anxiety state    Asthma (HCC)    excerise induced    Back problem    upper back pain from radiation    Bipolar disorder (HCC)    Bloating    Blood disorder    anemia resolved    Constipation    Years    Depression    Disorder of thyroid    Exposure to radiation    last dose February 2014    Fatigue    Fibromyalgia    Frequent urination    Headache disorder    Neck problems    History of blood transfusion    History of depression    Hx of motion sickness    Hypothyroidism (acquired)    Dx in 8/2017    Irregular bowel habits    Leaking of urine    After surgery    Migraines    in the past    Nausea    Gallbladder attacks    Neoplasm, breast    Pain in joints    Chemo    Personal history of antineoplastic chemotherapy     last dose 2/2014, patient also completed radiation therapy 2014    PONV (postoperative nausea and vomiting)    Presence of other cardiac implants and grafts    Left ankle    Restless leg syndrome    Restless leg syndrome    Sleep disturbance    Stress    Uncomfortable fullness  after meals    Uterine cancer (HCC)    Visual impairment    glasses,contacts    Wears glasses       Past Surgical History:  Past Surgical History:   Procedure Laterality Date          Colonoscopy N/A 11/10/2017    Procedure: COLONOSCOPY;  Surgeon: Ayan Ayon DO;  Location:  ENDOSCOPY    Colonoscopy N/A 2018    Procedure: COLONOSCOPY, POSSIBLE BIOPSY, POSSIBLE POLYPECTOMY 83083;  Surgeon: Ayan Ayon DO;  Location: [facility]    Hysterectomy      Estrada biopsy stereo nodule 1 site right (cpt=19081)      benign     Estrada biopsy stereo nodule 2 site bilat (cpt=19081/77378)      benign     Estrada localization wire 1 site left (cpt=19281)      Estrada localization wire 1 site right (cpt=19281)      Other      left knee surgery- allograft    Other      left ankle fracture     Other  2017    Anal exam under anesthesia    Other surgical history  10/2020    Port removal     Port, indwelling, imp      Sigmoidoscopy,diagnostic      Tonsillectomy  1963    Total abdom hysterectomy  24       Family History:  Family History   Problem Relation Age of Onset    Cancer Father         lung    Other (anuyrsem) Mother     Thyroid disease Sister         Hypothyroidism on Hazel thyroid    Cancer Self        Social History:  Social History     Socioeconomic History    Marital status:      Spouse name: Not on file    Number of children: 3    Years of education: Not on file    Highest education level: Not on file   Occupational History    Not on file   Tobacco Use    Smoking status: Never    Smokeless tobacco: Never    Tobacco comments:     non-smoker   Vaping Use    Vaping status: Never Used   Substance and Sexual Activity    Alcohol use: Yes     Comment: rare    Drug use: No    Sexual activity: Not Currently     Partners: Male   Other Topics Concern     Service Not Asked    Blood Transfusions Not Asked    Caffeine Concern Yes     Comment: 1 cup coffee per day    Occupational Exposure Not Asked     Hobby Hazards Not Asked    Sleep Concern Not Asked    Stress Concern Not Asked    Weight Concern Not Asked    Special Diet Not Asked    Back Care Not Asked    Exercise Yes     Comment: walking    Bike Helmet Not Asked    Seat Belt Yes    Self-Exams Not Asked   Social History Narrative    , lives with     Has 2 sons, 1 daughter - all living in same state     Social Drivers of Health     Food Insecurity: Patient Declined (4/2/2025)    Received from [facility]    Hunger Vital Sign     Worried About Running Out of Food in the Last Year: Patient declined     Ran Out of Food in the Last Year: Patient declined   Transportation Needs: No Transportation Needs (4/2/2025)    Received from [facility]    PRAPARE - Transportation     Lack of Transportation (Medical): No     Lack of Transportation (Non-Medical): No   Housing Stability: Unknown (4/2/2025)    Received from [facility]    Housing Stability Vital Sign     Unable to Pay for Housing in the Last Year: No     Number of Times Moved in the Last Year: Not on file     Homeless in the Last Year: Not on file       Current Medications:    Current Outpatient Medications:     metoclopramide 10 MG Oral Tab, Take 1 tablet (10 mg total) by mouth 4 (four) times daily as needed (nausea)., Disp: 60 tablet, Rfl: 3    ondansetron 8 MG Oral Tablet Dispersible, Take 1 tablet (8 mg total) by mouth every 8 (eight) hours as needed for Nausea., Disp: 30 tablet, Rfl: 3    levothyroxine 88 MCG Oral Tab, Take 1 tablet (88 mcg total) by mouth before breakfast., Disp: 30 tablet, Rfl: 3    pantoprazole 40 MG Oral Tab EC, Take 1 tablet (40 mg total) by mouth 2 (two) times daily before meals., Disp: 180 tablet, Rfl: 1    Gabapentin Enacarbil ER (HORIZANT) 300 MG Oral Tab CR, Take 900 mg by mouth in the morning., Disp: , Rfl:     Allergies:  Allergies   Allergen Reactions    Chlorhexidine HIVES    Citalopram HIVES     Etomidate ANAPHYLAXIS    Gabapentin OTHER (SEE COMMENTS)     swelling    Lexapro [Escitalopram] SWELLING     Legs and ankles    Mold WHEEZING    Pramipexole SWELLING    Pregabalin SWELLING    Quetiapine Fumarate HIVES, ITCHING and INSOMNIA    Dust OTHER (SEE COMMENTS)     Sneezing, sinus infection    Ropinirole OTHER (SEE COMMENTS)     Restless legs    Adhesive Tape RASH     Need to use surgery glue    Promethazine NAUSEA ONLY        Review of Systems:    Constitutional No fevers, chills, night sweats, excessive fatigue or weight loss.   Eyes No significant visual difficulties. No diplopia. No yellowing of the eyes.   Hematologic/Lymphatic No easy bruising or bleeding.  No any tender or palpable lymph nodes.   Respiratory No dyspnea, Pleuritic chest pain, cough or hemoptysis.   Cardiovascular No anginal chest pain, palpitations or orthopnea.   Gastrointestinal No nausea, vomiting, diarrhea, GI bleeding, or constipation.   Genitorurinary  No hematuria, dysuria, or incontinence. No abnormal bleeding.   Integumentary No rashes or yellowing of the skin   Neurologic No headache, blurred vision, and no areas of focal weakness. Normal gait.   Psychiatric No insomnia, depression, phyllis or mood swings.         Vital Signs:   Day 1,  Cycle 10  07/03/25   Height 1.6 m (5' 2.99\")   Weight 83.7 kg (184 lb 8 oz)   BSA (Calculated - sq m) 1.87 sq meters   BMI (Calculated) 32.69 kg/m2   /76   Pulse 85   BP Location Left arm   Patient Position Sitting   Temp 97.3 °F (36.3 °C)       Physical Examination:    Laboratory:    Recent Labs     07/03/25  1106   RBC 3.54 L   HGB 11.2 L   HCT 33.3 L   MCV 94.1   MCH 31.6   MCHC 33.6   RDW 15.9   NEPRELIM 1.99   WBC 3.8 L   .0     Recent Labs     07/03/25  1106   GLU 95   BUN 12   CREATSERUM 0.70   CA 9.8   ALB 4.1      K 3.8      CO2 26.0   ALKPHO 101   AST 39 H   ALT 51 H   BILT 0.8   TP 6.3     Radiology:  CT C/A/P: CONCLUSION:     1. Cholelithiasis without evidence  of cholecystitis           Pathology:  Final Diagnosis:   Right inferior parathyroid:  -Hypercellular parathyroid gland, 10 mm, with rim tissue.  -See comment.       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 Adjuvant chemo-immunotherapy has been recommended with Carbo Taxol + A checkpoint inhibitor x6 cycles. Radiation use is limited in her case due to the prior Rt for the anal canal cancer. Once she completes the adjuvant chemo-Immunotherapy, will plan for her to see Radiation to consider RT to the periaortic nodes, though I was concerned about the esophageal findings and mediastinal node. EGD revealed only esophagitis.  She completed 6 Cycles of Carbo/Taxol/Keytruda.  CT Demonstrates KASSANDRA. She has completed radiation. Will proceed with adjuvant Keytruda.     Retroperitoneal adenopathy. She has completed radiation. Will monitor on future scans.     History of Anal Canal cancer:  KASSANDRA.  Continue follow up with surgery.     Abd pain: Likely related to her cholelithiasis. Recommend general surgery eval.      Planned Follow Up:  3 weeks.    The patient and I have a longitudinal relationship to address/treat this serious and complex condition      Electronically Signed by:    Celio Trujillo M.D.  [facility]

## 2025-07-03 ENCOUNTER — OFFICE VISIT (OUTPATIENT)
Age: 65
End: 2025-07-03
Attending: INTERNAL MEDICINE
Payer: MEDICARE

## 2025-07-03 VITALS
HEART RATE: 85 BPM | BODY MASS INDEX: 32.69 KG/M2 | HEIGHT: 62.99 IN | RESPIRATION RATE: 18 BRPM | OXYGEN SATURATION: 98 % | TEMPERATURE: 97 F | SYSTOLIC BLOOD PRESSURE: 125 MMHG | DIASTOLIC BLOOD PRESSURE: 76 MMHG | WEIGHT: 184.5 LBS

## 2025-07-03 DIAGNOSIS — Z51.12 ENCOUNTER FOR ANTINEOPLASTIC IMMUNOTHERAPY: ICD-10-CM

## 2025-07-03 DIAGNOSIS — C54.1 ENDOMETRIAL CANCER, FIGO STAGE IIIC2 (HCC): Primary | ICD-10-CM

## 2025-07-03 LAB
ALBUMIN SERPL-MCNC: 4.1 G/DL (ref 3.2–4.8)
ALBUMIN/GLOB SERPL: 1.9 {RATIO} (ref 1–2)
ALP LIVER SERPL-CCNC: 101 U/L (ref 50–130)
ALT SERPL-CCNC: 51 U/L (ref 10–49)
ANION GAP SERPL CALC-SCNC: 8 MMOL/L (ref 0–18)
AST SERPL-CCNC: 39 U/L (ref ?–34)
BASOPHILS # BLD AUTO: 0.01 X10(3) UL (ref 0–0.2)
BASOPHILS NFR BLD AUTO: 0.3 %
BILIRUB SERPL-MCNC: 0.8 MG/DL (ref 0.2–1.1)
BUN BLD-MCNC: 12 MG/DL (ref 9–23)
CALCIUM BLD-MCNC: 9.8 MG/DL (ref 8.7–10.6)
CHLORIDE SERPL-SCNC: 106 MMOL/L (ref 98–112)
CO2 SERPL-SCNC: 26 MMOL/L (ref 21–32)
CREAT BLD-MCNC: 0.7 MG/DL (ref 0.55–1.02)
EGFRCR SERPLBLD CKD-EPI 2021: 96 ML/MIN/1.73M2 (ref 60–?)
EOSINOPHIL # BLD AUTO: 0.68 X10(3) UL (ref 0–0.7)
EOSINOPHIL NFR BLD AUTO: 17.8 %
ERYTHROCYTE [DISTWIDTH] IN BLOOD BY AUTOMATED COUNT: 15.9 %
GLOBULIN PLAS-MCNC: 2.2 G/DL (ref 2–3.5)
GLUCOSE BLD-MCNC: 95 MG/DL (ref 70–99)
HCT VFR BLD AUTO: 33.3 % (ref 35–48)
HGB BLD-MCNC: 11.2 G/DL (ref 12–16)
IMM GRANULOCYTES # BLD AUTO: 0.03 X10(3) UL (ref 0–1)
IMM GRANULOCYTES NFR BLD: 0.8 %
LYMPHOCYTES # BLD AUTO: 0.72 X10(3) UL (ref 1–4)
LYMPHOCYTES NFR BLD AUTO: 18.9 %
MCH RBC QN AUTO: 31.6 PG (ref 26–34)
MCHC RBC AUTO-ENTMCNC: 33.6 G/DL (ref 31–37)
MCV RBC AUTO: 94.1 FL (ref 80–100)
MONOCYTES # BLD AUTO: 0.38 X10(3) UL (ref 0.1–1)
MONOCYTES NFR BLD AUTO: 10 %
NEUTROPHILS # BLD AUTO: 1.99 X10 (3) UL (ref 1.5–7.7)
NEUTROPHILS # BLD AUTO: 1.99 X10(3) UL (ref 1.5–7.7)
NEUTROPHILS NFR BLD AUTO: 52.2 %
OSMOLALITY SERPL CALC.SUM OF ELEC: 290 MOSM/KG (ref 275–295)
PLATELET # BLD AUTO: 157 10(3)UL (ref 150–450)
POTASSIUM SERPL-SCNC: 3.8 MMOL/L (ref 3.5–5.1)
PROT SERPL-MCNC: 6.3 G/DL (ref 5.7–8.2)
RBC # BLD AUTO: 3.54 X10(6)UL (ref 3.8–5.3)
SODIUM SERPL-SCNC: 140 MMOL/L (ref 136–145)
T4 FREE SERPL-MCNC: 1.3 NG/DL (ref 0.8–1.7)
TSI SER-ACNC: 2.72 UIU/ML (ref 0.55–4.78)
WBC # BLD AUTO: 3.8 X10(3) UL (ref 4–11)

## 2025-07-03 RX ORDER — LOPERAMIDE HYDROCHLORIDE 2 MG/1
2 CAPSULE ORAL 4 TIMES DAILY PRN
COMMUNITY

## 2025-07-03 NOTE — PROGRESS NOTES
Pt here for C10D1 Drug(s)keytruda.  Arrives Ambulating independently, accompanied by self /family    Patient was evaluated today by Treatment RN    Oral medications included in this regimen: see MAR    Patient confirms comprehension of cancer treatment schedule: yes    Pregnancy screening: denies pregnancy    Modifications in dose or schedule: no    Medications appearance and physical integrity checked by RN: yes    Chemotherapy IV pump settings verified by 2 RNs: yes  Frequency of blood return and site check throughout administration: prior to administration and every 2-3 ml if ivp    Infusion/treatment outcome: pt  tolerated treatment with no c/o.     Education Record    Learner: patient  Barriers / Limitations:none  Method: verbal  Education / instructions given:  chemo administration  Outcome: pt verbalized understanding    Discharged home    Patient/family verbalized understanding of future appointments: yes

## 2025-07-03 NOTE — PROGRESS NOTES
Pt here for follow up and treatment.  Pt complains of fatigue and nausea.   She states she has been having diarrhea.   She took imodium on Monday.    No diarrhea since Monday.  Pt complains of pain right upper abdomen that goes around to her back.    She states it is worse after eating.

## 2025-08-04 RX ORDER — LEVOTHYROXINE SODIUM 88 UG/1
88 TABLET ORAL
Qty: 30 TABLET | Refills: 3 | Status: SHIPPED | OUTPATIENT
Start: 2025-08-04

## 2025-08-04 RX ORDER — LEVOTHYROXINE SODIUM 88 UG/1
88 TABLET ORAL
Qty: 30 TABLET | Refills: 0 | OUTPATIENT
Start: 2025-08-04

## 2025-08-06 ENCOUNTER — TELEPHONE (OUTPATIENT)
Facility: LOCATION | Age: 65
End: 2025-08-06

## 2025-08-06 ENCOUNTER — OFFICE VISIT (OUTPATIENT)
Facility: LOCATION | Age: 65
End: 2025-08-06

## 2025-08-06 VITALS
HEART RATE: 63 BPM | SYSTOLIC BLOOD PRESSURE: 112 MMHG | DIASTOLIC BLOOD PRESSURE: 74 MMHG | OXYGEN SATURATION: 98 % | TEMPERATURE: 97 F

## 2025-08-06 DIAGNOSIS — K80.10 CALCULUS OF GALLBLADDER WITH CHOLECYSTITIS WITHOUT BILIARY OBSTRUCTION, UNSPECIFIED CHOLECYSTITIS ACUITY: Primary | ICD-10-CM

## 2025-08-06 PROCEDURE — 99205 OFFICE O/P NEW HI 60 MIN: CPT | Performed by: STUDENT IN AN ORGANIZED HEALTH CARE EDUCATION/TRAINING PROGRAM

## 2025-08-06 RX ORDER — SODIUM, POTASSIUM,MAG SULFATES 17.5-3.13G
SOLUTION, RECONSTITUTED, ORAL ORAL
Qty: 1 KIT | Refills: 0 | Status: SHIPPED | OUTPATIENT
Start: 2025-08-06

## 2025-08-14 ENCOUNTER — OFFICE VISIT (OUTPATIENT)
Facility: LOCATION | Age: 65
End: 2025-08-14
Attending: INTERNAL MEDICINE

## 2025-08-14 VITALS
BODY MASS INDEX: 31.01 KG/M2 | HEIGHT: 62.99 IN | HEART RATE: 76 BPM | DIASTOLIC BLOOD PRESSURE: 69 MMHG | SYSTOLIC BLOOD PRESSURE: 107 MMHG | TEMPERATURE: 98 F | OXYGEN SATURATION: 99 % | WEIGHT: 175 LBS | RESPIRATION RATE: 16 BRPM

## 2025-08-14 DIAGNOSIS — C54.1 ENDOMETRIAL CANCER, FIGO STAGE IIIC2 (HCC): Primary | ICD-10-CM

## 2025-08-14 DIAGNOSIS — Z51.12 ENCOUNTER FOR ANTINEOPLASTIC IMMUNOTHERAPY: ICD-10-CM

## 2025-08-14 DIAGNOSIS — K80.12 CALCULUS OF GALLBLADDER WITH ACUTE ON CHRONIC CHOLECYSTITIS WITHOUT OBSTRUCTION: ICD-10-CM

## 2025-08-14 LAB
ALBUMIN SERPL-MCNC: 3.8 G/DL (ref 3.2–4.8)
ALBUMIN/GLOB SERPL: 1.8 (ref 1–2)
ALP LIVER SERPL-CCNC: 91 U/L (ref 50–130)
ALT SERPL-CCNC: 25 U/L (ref 10–49)
ANION GAP SERPL CALC-SCNC: 7 MMOL/L (ref 0–18)
AST SERPL-CCNC: 25 U/L (ref ?–34)
BASOPHILS # BLD AUTO: 0.01 X10(3) UL (ref 0–0.2)
BASOPHILS NFR BLD AUTO: 0.3 %
BILIRUB SERPL-MCNC: 0.4 MG/DL (ref 0.2–1.1)
BUN BLD-MCNC: 17 MG/DL (ref 9–23)
CALCIUM BLD-MCNC: 9.4 MG/DL (ref 8.7–10.6)
CHLORIDE SERPL-SCNC: 109 MMOL/L (ref 98–112)
CO2 SERPL-SCNC: 26 MMOL/L (ref 21–32)
CREAT BLD-MCNC: 0.68 MG/DL (ref 0.55–1.02)
EGFRCR SERPLBLD CKD-EPI 2021: 97 ML/MIN/1.73M2 (ref 60–?)
EOSINOPHIL # BLD AUTO: 0.34 X10(3) UL (ref 0–0.7)
EOSINOPHIL NFR BLD AUTO: 11.8 %
ERYTHROCYTE [DISTWIDTH] IN BLOOD BY AUTOMATED COUNT: 13.6 %
GLOBULIN PLAS-MCNC: 2.1 G/DL (ref 2–3.5)
GLUCOSE BLD-MCNC: 107 MG/DL (ref 70–99)
HCT VFR BLD AUTO: 35.6 % (ref 35–48)
HGB BLD-MCNC: 12 G/DL (ref 12–16)
IMM GRANULOCYTES # BLD AUTO: 0.01 X10(3) UL (ref 0–1)
IMM GRANULOCYTES NFR BLD: 0.3 %
LYMPHOCYTES # BLD AUTO: 0.7 X10(3) UL (ref 1–4)
LYMPHOCYTES NFR BLD AUTO: 24.3 %
MCH RBC QN AUTO: 31.8 PG (ref 26–34)
MCHC RBC AUTO-ENTMCNC: 33.7 G/DL (ref 31–37)
MCV RBC AUTO: 94.4 FL (ref 80–100)
MONOCYTES # BLD AUTO: 0.28 X10(3) UL (ref 0.1–1)
MONOCYTES NFR BLD AUTO: 9.7 %
NEUTROPHILS # BLD AUTO: 1.54 X10 (3) UL (ref 1.5–7.7)
NEUTROPHILS # BLD AUTO: 1.54 X10(3) UL (ref 1.5–7.7)
NEUTROPHILS NFR BLD AUTO: 53.6 %
OSMOLALITY SERPL CALC.SUM OF ELEC: 296 MOSM/KG (ref 275–295)
PLATELET # BLD AUTO: 158 10(3)UL (ref 150–450)
POTASSIUM SERPL-SCNC: 4 MMOL/L (ref 3.5–5.1)
PROT SERPL-MCNC: 5.9 G/DL (ref 5.7–8.2)
RBC # BLD AUTO: 3.77 X10(6)UL (ref 3.8–5.3)
SODIUM SERPL-SCNC: 142 MMOL/L (ref 136–145)
T4 FREE SERPL-MCNC: 1.3 NG/DL (ref 0.8–1.7)
TSI SER-ACNC: 4.31 UIU/ML (ref 0.55–4.78)
WBC # BLD AUTO: 2.9 X10(3) UL (ref 4–11)

## 2025-08-19 ENCOUNTER — ANESTHESIA (OUTPATIENT)
Dept: SURGERY | Facility: HOSPITAL | Age: 65
End: 2025-08-19

## 2025-08-19 ENCOUNTER — ANESTHESIA EVENT (OUTPATIENT)
Dept: SURGERY | Facility: HOSPITAL | Age: 65
End: 2025-08-19

## 2025-08-19 ENCOUNTER — APPOINTMENT (OUTPATIENT)
Dept: GENERAL RADIOLOGY | Facility: HOSPITAL | Age: 65
End: 2025-08-19
Attending: STUDENT IN AN ORGANIZED HEALTH CARE EDUCATION/TRAINING PROGRAM

## 2025-08-19 ENCOUNTER — HOSPITAL ENCOUNTER (OUTPATIENT)
Facility: HOSPITAL | Age: 65
Setting detail: HOSPITAL OUTPATIENT SURGERY
Discharge: HOME | End: 2025-08-19
Attending: STUDENT IN AN ORGANIZED HEALTH CARE EDUCATION/TRAINING PROGRAM | Admitting: STUDENT IN AN ORGANIZED HEALTH CARE EDUCATION/TRAINING PROGRAM

## 2025-08-19 VITALS
OXYGEN SATURATION: 94 % | RESPIRATION RATE: 18 BRPM | TEMPERATURE: 98 F | HEART RATE: 63 BPM | DIASTOLIC BLOOD PRESSURE: 72 MMHG | BODY MASS INDEX: 30.86 KG/M2 | WEIGHT: 174.19 LBS | SYSTOLIC BLOOD PRESSURE: 130 MMHG | HEIGHT: 63 IN

## 2025-08-19 DIAGNOSIS — K80.10 CALCULUS OF GALLBLADDER WITH CHOLECYSTITIS WITHOUT BILIARY OBSTRUCTION, UNSPECIFIED CHOLECYSTITIS ACUITY: ICD-10-CM

## 2025-08-19 PROCEDURE — 47562 LAPAROSCOPIC CHOLECYSTECTOMY: CPT | Performed by: STUDENT IN AN ORGANIZED HEALTH CARE EDUCATION/TRAINING PROGRAM

## 2025-08-19 PROCEDURE — 47562 LAPAROSCOPIC CHOLECYSTECTOMY: CPT

## 2025-08-19 RX ORDER — MEPERIDINE HYDROCHLORIDE 25 MG/ML
12.5 INJECTION INTRAMUSCULAR; INTRAVENOUS; SUBCUTANEOUS AS NEEDED
Status: DISCONTINUED | OUTPATIENT
Start: 2025-08-19 | End: 2025-08-19

## 2025-08-19 RX ORDER — DEXAMETHASONE SODIUM PHOSPHATE 4 MG/ML
VIAL (ML) INJECTION AS NEEDED
Status: DISCONTINUED | OUTPATIENT
Start: 2025-08-19 | End: 2025-08-19 | Stop reason: SURG

## 2025-08-19 RX ORDER — ONDANSETRON 2 MG/ML
INJECTION INTRAMUSCULAR; INTRAVENOUS AS NEEDED
Status: DISCONTINUED | OUTPATIENT
Start: 2025-08-19 | End: 2025-08-19 | Stop reason: SURG

## 2025-08-19 RX ORDER — METOCLOPRAMIDE HYDROCHLORIDE 5 MG/ML
INJECTION INTRAMUSCULAR; INTRAVENOUS
Status: COMPLETED
Start: 2025-08-19 | End: 2025-08-19

## 2025-08-19 RX ORDER — BUPIVACAINE HYDROCHLORIDE AND EPINEPHRINE 5; 5 MG/ML; UG/ML
INJECTION, SOLUTION EPIDURAL; INTRACAUDAL; PERINEURAL AS NEEDED
Status: DISCONTINUED | OUTPATIENT
Start: 2025-08-19 | End: 2025-08-19 | Stop reason: HOSPADM

## 2025-08-19 RX ORDER — OXYCODONE HYDROCHLORIDE 5 MG/1
5 TABLET ORAL EVERY 6 HOURS PRN
Qty: 10 TABLET | Refills: 0 | Status: SHIPPED | OUTPATIENT
Start: 2025-08-19

## 2025-08-19 RX ORDER — HYDROMORPHONE HYDROCHLORIDE 1 MG/ML
INJECTION, SOLUTION INTRAMUSCULAR; INTRAVENOUS; SUBCUTANEOUS
Status: COMPLETED
Start: 2025-08-19 | End: 2025-08-19

## 2025-08-19 RX ORDER — LABETALOL HYDROCHLORIDE 5 MG/ML
5 INJECTION, SOLUTION INTRAVENOUS EVERY 5 MIN PRN
Status: DISCONTINUED | OUTPATIENT
Start: 2025-08-19 | End: 2025-08-19

## 2025-08-19 RX ORDER — HEPARIN SODIUM 5000 [USP'U]/ML
5000 INJECTION, SOLUTION INTRAVENOUS; SUBCUTANEOUS ONCE
Status: COMPLETED | OUTPATIENT
Start: 2025-08-19 | End: 2025-08-19

## 2025-08-19 RX ORDER — HYDROMORPHONE HYDROCHLORIDE 1 MG/ML
0.4 INJECTION, SOLUTION INTRAMUSCULAR; INTRAVENOUS; SUBCUTANEOUS EVERY 5 MIN PRN
Status: DISCONTINUED | OUTPATIENT
Start: 2025-08-19 | End: 2025-08-19

## 2025-08-19 RX ORDER — HYDROCODONE BITARTRATE AND ACETAMINOPHEN 5; 325 MG/1; MG/1
1 TABLET ORAL ONCE AS NEEDED
Status: DISCONTINUED | OUTPATIENT
Start: 2025-08-19 | End: 2025-08-19

## 2025-08-19 RX ORDER — ROCURONIUM BROMIDE 10 MG/ML
INJECTION, SOLUTION INTRAVENOUS AS NEEDED
Status: DISCONTINUED | OUTPATIENT
Start: 2025-08-19 | End: 2025-08-19 | Stop reason: SURG

## 2025-08-19 RX ORDER — SODIUM CHLORIDE, SODIUM LACTATE, POTASSIUM CHLORIDE, CALCIUM CHLORIDE 600; 310; 30; 20 MG/100ML; MG/100ML; MG/100ML; MG/100ML
INJECTION, SOLUTION INTRAVENOUS CONTINUOUS
Status: DISCONTINUED | OUTPATIENT
Start: 2025-08-19 | End: 2025-08-19

## 2025-08-19 RX ORDER — ONDANSETRON 2 MG/ML
4 INJECTION INTRAMUSCULAR; INTRAVENOUS EVERY 6 HOURS PRN
Status: DISCONTINUED | OUTPATIENT
Start: 2025-08-19 | End: 2025-08-19

## 2025-08-19 RX ORDER — SODIUM CHLORIDE 9 MG/ML
INJECTION, SOLUTION INTRAVENOUS CONTINUOUS
Status: DISCONTINUED | OUTPATIENT
Start: 2025-08-19 | End: 2025-08-19

## 2025-08-19 RX ORDER — HYDROMORPHONE HYDROCHLORIDE 1 MG/ML
0.2 INJECTION, SOLUTION INTRAMUSCULAR; INTRAVENOUS; SUBCUTANEOUS EVERY 5 MIN PRN
Status: DISCONTINUED | OUTPATIENT
Start: 2025-08-19 | End: 2025-08-19

## 2025-08-19 RX ORDER — LIDOCAINE HYDROCHLORIDE 10 MG/ML
INJECTION, SOLUTION EPIDURAL; INFILTRATION; INTRACAUDAL; PERINEURAL AS NEEDED
Status: DISCONTINUED | OUTPATIENT
Start: 2025-08-19 | End: 2025-08-19 | Stop reason: SURG

## 2025-08-19 RX ORDER — HYDROMORPHONE HYDROCHLORIDE 1 MG/ML
0.6 INJECTION, SOLUTION INTRAMUSCULAR; INTRAVENOUS; SUBCUTANEOUS EVERY 5 MIN PRN
Status: DISCONTINUED | OUTPATIENT
Start: 2025-08-19 | End: 2025-08-19

## 2025-08-19 RX ORDER — ACETAMINOPHEN 500 MG
1000 TABLET ORAL ONCE AS NEEDED
Status: DISCONTINUED | OUTPATIENT
Start: 2025-08-19 | End: 2025-08-19

## 2025-08-19 RX ORDER — DIPHENHYDRAMINE HYDROCHLORIDE 50 MG/ML
12.5 INJECTION, SOLUTION INTRAMUSCULAR; INTRAVENOUS AS NEEDED
Status: DISCONTINUED | OUTPATIENT
Start: 2025-08-19 | End: 2025-08-19

## 2025-08-19 RX ORDER — NALOXONE HYDROCHLORIDE 0.4 MG/ML
80 INJECTION, SOLUTION INTRAMUSCULAR; INTRAVENOUS; SUBCUTANEOUS AS NEEDED
Status: DISCONTINUED | OUTPATIENT
Start: 2025-08-19 | End: 2025-08-19

## 2025-08-19 RX ORDER — MIDAZOLAM HYDROCHLORIDE 1 MG/ML
1 INJECTION INTRAMUSCULAR; INTRAVENOUS EVERY 5 MIN PRN
Status: DISCONTINUED | OUTPATIENT
Start: 2025-08-19 | End: 2025-08-19

## 2025-08-19 RX ORDER — ACETAMINOPHEN 500 MG
1000 TABLET ORAL ONCE
Status: DISCONTINUED | OUTPATIENT
Start: 2025-08-19 | End: 2025-08-19 | Stop reason: HOSPADM

## 2025-08-19 RX ORDER — HYDROCODONE BITARTRATE AND ACETAMINOPHEN 5; 325 MG/1; MG/1
2 TABLET ORAL ONCE AS NEEDED
Status: DISCONTINUED | OUTPATIENT
Start: 2025-08-19 | End: 2025-08-19

## 2025-08-19 RX ORDER — METOCLOPRAMIDE HYDROCHLORIDE 5 MG/ML
10 INJECTION INTRAMUSCULAR; INTRAVENOUS EVERY 8 HOURS PRN
Status: DISCONTINUED | OUTPATIENT
Start: 2025-08-19 | End: 2025-08-19

## 2025-08-19 RX ORDER — KETOROLAC TROMETHAMINE 30 MG/ML
INJECTION, SOLUTION INTRAMUSCULAR; INTRAVENOUS AS NEEDED
Status: DISCONTINUED | OUTPATIENT
Start: 2025-08-19 | End: 2025-08-19 | Stop reason: SURG

## 2025-08-19 RX ORDER — INDOCYANINE GREEN AND WATER 25 MG
5 KIT INJECTION ONCE
Status: COMPLETED | OUTPATIENT
Start: 2025-08-19 | End: 2025-08-19

## 2025-08-19 RX ADMIN — DEXAMETHASONE SODIUM PHOSPHATE 8 MG: 4 MG/ML VIAL (ML) INJECTION at 15:35:00

## 2025-08-19 RX ADMIN — ONDANSETRON 4 MG: 2 INJECTION INTRAMUSCULAR; INTRAVENOUS at 16:44:00

## 2025-08-19 RX ADMIN — LIDOCAINE HYDROCHLORIDE 50 MG: 10 INJECTION, SOLUTION EPIDURAL; INFILTRATION; INTRACAUDAL; PERINEURAL at 15:27:00

## 2025-08-19 RX ADMIN — ROCURONIUM BROMIDE 40 MG: 10 INJECTION, SOLUTION INTRAVENOUS at 15:27:00

## 2025-08-19 RX ADMIN — KETOROLAC TROMETHAMINE 30 MG: 30 INJECTION, SOLUTION INTRAMUSCULAR; INTRAVENOUS at 16:44:00

## (undated) DEVICE — DAVINCI XI CLIP HEM O LOK LARGE PURPLE

## (undated) DEVICE — STERILE POLYISOPRENE POWDER-FREE SURGICAL GLOVES: Brand: PROTEXIS

## (undated) DEVICE — Device

## (undated) DEVICE — KIT VLV 5 PC AIR H2O SUCT BX ENDOGATOR CONN

## (undated) DEVICE — SYRINGE 10ML LL TIP

## (undated) DEVICE — SOLUTION ANTIFOG VIS SYS CLEARIFY LAPSCP

## (undated) DEVICE — BALLOON HEMOSTATIC EUS LINEAR

## (undated) DEVICE — SNARE CAPTIFLEX MICRO-OVL OLY

## (undated) DEVICE — MINI LAP PACK-LF: Brand: MEDLINE INDUSTRIES, INC.

## (undated) DEVICE — SUT PDS II 1 36IN ABSRB VLT L36MM CT-1

## (undated) DEVICE — RECTAL CDS-LF: Brand: MEDLINE INDUSTRIES, INC.

## (undated) DEVICE — SUT VICRYL 3-0 SH J416H

## (undated) DEVICE — DRAPE EXT W21XH19XL10.5IN DA VINCI XI

## (undated) DEVICE — PAD ULNAR NERVE PROTECTOR

## (undated) DEVICE — PASSER SUT NDL 14GA 5-8MM TRCR 10-12MM SWAB

## (undated) DEVICE — BITEBLOCK ENDOSCP 60FR MAXI STRP

## (undated) DEVICE — SLEEVE KENDALL SCD EXPRESS MED

## (undated) DEVICE — BINDER ABD UNISX 9IN 62IN L AND XL UNIV

## (undated) DEVICE — Device: Brand: DEFENDO AIR/WATER/SUCTION AND BIOPSY VALVE

## (undated) DEVICE — STERILE SYNTHETIC POLYISOPRENE POWDER-FREE SURGICAL GLOVES WITH HYDROGEL COATING: Brand: PROTEXIS

## (undated) DEVICE — 1200CC GUARDIAN II: Brand: GUARDIAN

## (undated) DEVICE — GLOVE SURG TRIUMPH SZ 71/2

## (undated) DEVICE — DRAPE TOP 102X53IN ABSRB REINF HK AND LOOP LN

## (undated) DEVICE — SUTURE VICRYL 2-0 UR-6

## (undated) DEVICE — BINDER ABD SM M W9IN FOR 30-45IN 3 PNL E

## (undated) DEVICE — SUT MONOCRYL 4-0 PS-2 Y496G

## (undated) DEVICE — KENDALL SCD EXPRESS SLEEVES, KNEE LENGTH, MEDIUM: Brand: KENDALL SCD

## (undated) DEVICE — ENDOSCOPY PACK - LOWER: Brand: MEDLINE INDUSTRIES, INC.

## (undated) DEVICE — CHLORAPREP 26ML APPLICATOR

## (undated) DEVICE — ABSORBABLE HEMOSTAT (OXIDIZED REGENERATED CELLULOSE, U.S.P.): Brand: SURGICEL

## (undated) DEVICE — V2 SPECIMEN COLLECTION MANIFOLD KIT: Brand: NEPTUNE

## (undated) DEVICE — SOL  .9 1000ML BTL

## (undated) DEVICE — THYROID: Brand: MEDLINE INDUSTRIES, INC.

## (undated) DEVICE — KIT CUSTOM ENDOPROCEDURE STERIS

## (undated) DEVICE — SUT MCRYL 4-0 18IN PS-2 ABSRB UD 19MM 3/8 CIR

## (undated) DEVICE — GAUZE SPONGES,USP TYPE VII GAUZE, 12 PLY: Brand: CURITY

## (undated) DEVICE — SUT PDS II 0 36IN CT-1 ABSRB VLT L36MM 1/2

## (undated) DEVICE — ROBOTIC GENERAL CUSTOM PK

## (undated) DEVICE — BRIEF INCONTINENCE ADULT 2XL

## (undated) DEVICE — GLOVE SUR 7 SENSICARE PI PIP CRM PWD F

## (undated) DEVICE — 10FT COMBINED O2 DELIVERY/CO2 MONITORING. FILTER WITH MICROSTREAM TYPE LUER: Brand: DUAL ADULT NASAL CANNULA

## (undated) DEVICE — 3M(TM) MICROPORE TAPE DISPENSER 1535-2: Brand: 3M™ MICROPORE™

## (undated) DEVICE — 3M™ RED DOT™ MONITORING ELECTRODE WITH FOAM TAPE AND STICKY GEL, 50/BAG, 20/CASE, 72/PLT 2570: Brand: RED DOT™

## (undated) DEVICE — GLOVE SUR 7.5 SENSICARE PI PIP GRN PWD F

## (undated) DEVICE — DERMABOND LIQUID ADHESIVE

## (undated) DEVICE — GLOVE BIOGEL M SURG SZ 71/2

## (undated) DEVICE — SYRINGE MED 10ML LL TIP W/O SFTY DISP

## (undated) DEVICE — AIRWAY ENDO  TRIVANTAGE 7MM

## (undated) DEVICE — 3M™ TEGADERM™ TRANSPARENT FILM DRESSING, 1626W, 4 IN X 4-3/4 IN (10 CM X 12 CM), 50 EACH/CARTON, 4 CARTON/CASE: Brand: 3M™ TEGADERM™

## (undated) DEVICE — CLOSURE EXOFIN 1.0ML

## (undated) DEVICE — GRASPER LAP L35MM DIA5MM EPIX

## (undated) DEVICE — NEEDLE INSUF 13GA L120MM LAP SPR LD BLNT STYL

## (undated) DEVICE — SUT SILK 3-0 A304H

## (undated) DEVICE — FILTERLINE NASAL ADULT O2/CO2

## (undated) DEVICE — TRAP 4 CPTR CHMBR N EZ INLN

## (undated) DEVICE — PAD SACRAL SPAN AID

## (undated) DEVICE — 3M™ STERI-STRIP™ REINFORCED ADHESIVE SKIN CLOSURES, R1547, 1/2 IN X 4 IN (12 MM X 100 MM), 6 STRIPS/ENVELOPE: Brand: 3M™ STERI-STRIP™

## (undated) DEVICE — SPONGE STICK WITH PVP-I: Brand: KENDALL

## (undated) DEVICE — SOLUTION  .9 1000ML BTL

## (undated) NOTE — LETTER
2014 [facility], [address] [place] [id number]  [phone]            5/23/18      [id number] [place] [address]      Dear Merly Akers,  To help us provide the highest quality care, EM

## (undated) NOTE — LETTER
[address] [place], [address] [place] [address], [address] [place] [address]            June 26, 2019      Zia Torres  [address] [facility]      Dear Ms. Zia Torres,

## (undated) NOTE — Clinical Note
Thank you for referring Monica to the [facility]. I met with her in consultation today. I have ordered labs, set up a nutrition consultation with our dietician.   She was started on low dose phentermine for medication therapy and will follow

## (undated) NOTE — Clinical Note
Hello please see CT scan report related to renal findings. Patient mentioned to me that you would want to see these results as you had been following them. Let me know if you need any further assistance with this. Bernice Rivera

## (undated) NOTE — LETTER
Date: 2021      Patient Name: Sheri Conley      : 1960        Thank you for choosing [facility] as your health care provider. Your physician has deemed the following medical service(s) necessary.  However, your insurance p

## (undated) NOTE — LETTER
04/22/19    [address] [facility]          Dear Therese Alvarez,    According to our records, you are due for your annual mammography screening. Please contact our office to obtain a mammogram order.  If this letter has been sent in error

## (undated) NOTE — LETTER
October 21, 2019    [address]    Dear Gurpreet Rock: It was a pleasure speaking with you over the phone recently.  To follow up, I wanted to send you some contact information to utilize when you have a question and or need so

## (undated) NOTE — Clinical Note
Namrata Payan saw Franklin Singh in the office today. She presents for removal of her port as well as examination of her anal canal.  She is status post treatment of anal squamous cell carcinoma. I am scheduling her for both procedures.   Thank you Barbara Arvizu

## (undated) NOTE — Clinical Note
Patient was seen for their 1 month f/u at [facility] with a total weight loss of 3# since initial consult.

## (undated) NOTE — LETTER
ASTHMA ACTION PLAN for Sis Rai     : 1960     Date: 10/25/18  Doctor:  Halima Graham DO  Phone for doctor or clinic: [address], [address] [facility],[address] [place] [id number] [phone]      ACT Score: 21    AC or call 911 immediately! If symptoms improve, call office for appointment immediately.     Albuterol inhaler 2 puffs every 20 minutes for three treatments       Don't forget:  · Rinse mouth after using inhaler  · Use spacer for inhaler  · Remember to get yo

## (undated) NOTE — LETTER
12/29/20        [address] [facility]      Dear Дмитрий Guzman records indicate that you have outstanding lab work and or testing that was ordered for you and has not yet been completed:        Lipid Panel [E]      Hemoglob

## (undated) NOTE — Clinical Note
Catrachito, I saw her in a.m. my office today. She has completed treatment for anal squamous cell carcinoma. On physical examination I find no evidence of recurrence.   I am recommending an anal examination under anesthesia which will be scheduled in the short-

## (undated) NOTE — MR AVS SNAPSHOT
After Visit Summary   3/2/2021    Sheri Conley    MRN: [id number]           Visit Information     Date & Time  3/2/2021  1:30 PM Provider  Claudia [address], [facility]  Phone  [phone]      You Post-menopausal atrophic vaginitis   [760532]    History of anal cancer   [2526143]             We Ordered the Following     Normal Orders This Visit    THINPREP PAP WITH HPV REFLEX REQUEST B [[id number] CUSTOM]     THINPREP PAP WITH HPV REFLEX REQUEST [RYU40 Treatment for mild illness or injury that does not require immediate attention.  Average cost  $70*   [facility]  Monday – Friday  8:00 am – 8:00 pm   Saturday – Sunday  8:00 am – 4:00 pm    [facility]  Primary Care

## (undated) NOTE — Clinical Note
Hello doctors, this patient had an incidental finding on x-rays obtained while undergoing chiropractic care. A pelvic mass is noted. Obviously given her history I am obtaining a CT abdomen pelvis and returning her to both of your attention.   Let me know

## (undated) NOTE — Clinical Note
Pt has low wbc and inquiring about this result. Please advise patient on what to do. Thanks!  Dean Phan

## (undated) NOTE — LETTER
11/06/24      Monica Holguin  [address] [facility] [address]          Dear Monica Holguin    Our records indicate that you have outstanding lab work and/or testing that was ordered for you and has not yet been completed:    Lab Frequency Next Occurrence   [facility] GEORGE 2D+3D SCREENING BILAT (CPT=77067/46521) Once 06/04/2024     To provide you with the best possible care, please complete these orders at your earliest convenience. If you have recently completed these orders please disregard this letter.  To schedule imaging, labs, or outpatient tests please call Central Scheduling at [phone].                          Thank you,    [facility]